# Patient Record
Sex: FEMALE | Race: WHITE | Employment: PART TIME | URBAN - METROPOLITAN AREA
[De-identification: names, ages, dates, MRNs, and addresses within clinical notes are randomized per-mention and may not be internally consistent; named-entity substitution may affect disease eponyms.]

---

## 2017-01-24 ENCOUNTER — TRANSFERRED RECORDS (OUTPATIENT)
Dept: HEALTH INFORMATION MANAGEMENT | Facility: CLINIC | Age: 27
End: 2017-01-24

## 2017-01-24 ENCOUNTER — OFFICE VISIT (OUTPATIENT)
Dept: SURGERY | Facility: CLINIC | Age: 27
End: 2017-01-24
Payer: COMMERCIAL

## 2017-01-24 VITALS
WEIGHT: 245.8 LBS | DIASTOLIC BLOOD PRESSURE: 58 MMHG | OXYGEN SATURATION: 98 % | TEMPERATURE: 98.4 F | BODY MASS INDEX: 43.55 KG/M2 | HEART RATE: 86 BPM | RESPIRATION RATE: 14 BRPM | HEIGHT: 63 IN | SYSTOLIC BLOOD PRESSURE: 120 MMHG

## 2017-01-24 DIAGNOSIS — R12 HEARTBURN: ICD-10-CM

## 2017-01-24 DIAGNOSIS — F41.9 ANXIETY: ICD-10-CM

## 2017-01-24 DIAGNOSIS — Z13.29 SCREENING FOR ENDOCRINE, NUTRITIONAL, METABOLIC AND IMMUNITY DISORDER: ICD-10-CM

## 2017-01-24 DIAGNOSIS — E66.01 OBESITY, MORBID, BMI 40.0-49.9 (H): Primary | ICD-10-CM

## 2017-01-24 DIAGNOSIS — Z13.228 SCREENING FOR ENDOCRINE, NUTRITIONAL, METABOLIC AND IMMUNITY DISORDER: ICD-10-CM

## 2017-01-24 DIAGNOSIS — D50.9 IRON DEFICIENCY ANEMIA, UNSPECIFIED IRON DEFICIENCY ANEMIA TYPE: ICD-10-CM

## 2017-01-24 DIAGNOSIS — Z13.21 SCREENING FOR ENDOCRINE, NUTRITIONAL, METABOLIC AND IMMUNITY DISORDER: ICD-10-CM

## 2017-01-24 DIAGNOSIS — Z13.0 SCREENING FOR ENDOCRINE, NUTRITIONAL, METABOLIC AND IMMUNITY DISORDER: ICD-10-CM

## 2017-01-24 DIAGNOSIS — J45.20 MILD INTERMITTENT ASTHMA IN ADULT WITHOUT COMPLICATION: ICD-10-CM

## 2017-01-24 DIAGNOSIS — K50.00 CROHN'S DISEASE OF SMALL INTESTINE WITHOUT COMPLICATION (H): ICD-10-CM

## 2017-01-24 PROCEDURE — 97802 MEDICAL NUTRITION INDIV IN: CPT | Performed by: DIETITIAN, REGISTERED

## 2017-01-24 PROCEDURE — 99205 OFFICE O/P NEW HI 60 MIN: CPT | Performed by: PHYSICIAN ASSISTANT

## 2017-01-24 RX ORDER — FERROUS SULFATE 325(65) MG
TABLET ORAL
COMMUNITY
End: 2017-11-27

## 2017-01-24 NOTE — PROGRESS NOTES
Name: IGNACIO MONROY  : 1990  Gender: Female  MRN: [id number]  Age: 26  INITIAL BARIATRIC NUTRITION ASSESSMENT  REASON FOR VISIT:  IGNACIO MONROY is a 26 year old Female presents today for initial nutrition visit for bariatric surgery.  DIAGNOSIS:  Class III Obesity  ANTHROPOMETRICS:  Height: 63 inches  Weight: 245.8 lbs  BMI: 43.5 kg/m2  CURRENT SUPPLEMENTS:  Multivitamin/Mineral: Multivitamin daily  WEIGHT LOSS ATTEMPTS:  Weight Watchers, Calorie Counting  Prescription diet medications:  None  NUTRITION HISTORY:  Meals per day: 3  Snacking: Yes  Breakfast: Hard boiled eggs, turkey sausage(week)   Lunch: Sandwiches,I usually eat out alot on the weekends, salads, frozen meals, yogurt  Supper: Steak, chicken, rice and a veggie usually  Snacks: pretzels,cheese, oranges, grapes, celery with PB. (3x/day)   Drinks: water, occasional almond milk   Emotional eating: Yes  Binge eating: No  Night eating: No  Can you afford three meals per day? Yes  Can you afford the $50-60 per month for vitamins? 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 Pt has been researching bariatric surgery extensively and is very aware of many of the dietary changes required. Of note, pt is newly diagnosed with Crohn's disease however well controlled through current meds. Per PA-C, potentially will take longer to get to surgery depending on GI doctor's POC. Pt had many appropriate questions today.  DINING OUT HISTORY:  Frequency per week: Around once a week  Location: sit-down restaurants  Type of food: I try to order off of the lighter fare menus. So it could be either salmon with rice and veggies, steak, turkey burgers, wraps etc.   PHYSICAL ACTIVITY:  Type: Walking  Frequency: < 1x/week  Duration (min): 20  NUTRITION DIAGNOSIS:  Patient with excessive oral food/beverage intake related to intake of calorically dense food/beverages at meals and/or snacks as evidenced by BMI of 43.5  INTERVENTION:  Nutrition  Prescription: Recommend nutrient/ energy modification   Goals:  Begin taking multivitamin, calcium and vitamin D per guidelines.  Practice taking 20-30 minutes to finish a meal.  Practice being mindful of satiety/fullness.  Avoid evening snacking.    Implementation:  Provided written guidelines for Laparoscopic Gastric Sleeve surgery.  Provided weight loss suggestions.  Explained diet changes that would occur after surgery.  Emphasized importance of diet and lifestyle modifications.  Discussed necessity for chewable multivitamin/ mineral supplements, calcium with vitamin D, vitamin B-12, vitamin D, Iron and vitamin C supplements.  NUTRITION MONITORING AND EVALUATION:  Verbalizes understanding of surgery diet guidelines.  Anticipated compliance: Good    FOLLOW UP: in 1 month  Recommend 2-3 follow up visits to assist with lifestyle changes or per insurance requirements.  RD name and number provided.  TIME SPENT WITH PATIENT: 45 minutes  Christina Tate RD, LD  Clinical Dietitian

## 2017-01-24 NOTE — PATIENT INSTRUCTIONS
Please refer to your task list created today at your appointment.  Make appointment to return to clinic in 1 month to see the dietician.  Have initial labs drawn.     [facility] 1st floor if you would like to have your blood tests drawn today or call [phone] to make an appointment to have them drawn on another day.  You may also get your blood drawn at your [facility] clinic.

## 2017-01-24 NOTE — PROGRESS NOTES
[facility]  [address]  [place] [address]  Phone [phone] Fax [phone]    Date: 2017  RE:   IGNACIO MONROY  MR#: [id number]  : 1990    Dear Dr Mavis MD;    I had the pleasure of seeing, IGNACIO MONROY, to evaluate her obesity and consider her for possible weight loss surgery.  She has an appointment with you in February to establish care. IGNACIO is 26 years old. She has been overweight since early childhood. She has been morbidly obese for the last 15 years and has been unable to achieve long-term success with weight loss attempts, such as: Weight Watchers, Calorie Counting, exercise, slim fast.    Comorbidities of obesity from her past medical history include: mild intermittent asthma.    The symptoms related to her obesity include  intermittent heartburn, Shortness of Breath with activity,and  intertrigo. The following ADLs are hindered due to her weight: Not enough energy to complete routine daily tasks, Shortness of breath with little activity.    Non-Obesity Related Past Medical History:    Crohn s disease- diagnosed 2016 at [facility]. At diagnosis inflammation and an abscess was present on colonoscopy. Was started on Remacaid.  Transferring and established care with [facility] 2017.  Current plan: continue Remacaid, Colonoscopy in May 2017.  Current symptoms: none.     Iron Deficency Anemia- HgB is around 8. Dx is related to crohn's.  [facility] is following and pt will most likely get iron infusions once recent irons labs are back.    Anxiety- stable, on prozac.    High Velocity noted in Aortic arch found via Echo around age 14.  Follow up MRI and Echo where normal.    Past Surgical History: No previous bleeding, anesthesia, or nausea concerns with surgery.   x 1  Breast Reduction  Hx of VSD as baby    Family History:  Mother- Diabetes;gall bladder problems;High blood pressure;High cholesterol;Obese,  MGF- Colon Cancer:   MGM-  "pulmonary embolism (PE), High blood pressure;High cholesterol, COPD  Sibling 1- High blood pressure;High cholesterol;Obese    Social History:  She is recently  to [place] who is also looking at starting the bariatric process. Has a 7 and 2 year old.  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  ETOH: Monthly, 1 drink at a time  Illicit Drug Use: None  Tobacco Use: No  Support system: , mom will be available to help the patient in the early post op period. , best friend, mom is who the patient can count on for support throughout her weight loss journey.  Can you afford three meals per day? Yes  Can you afford the $50-60 per month for vitamins? Yes    A 14 point review of system shows:  Pulmonary: Shortness of Breath with activity, asthma- only needs rescue inhaler during colds  Gastrointestinal: crohn's disease see PMH,  Psychological: Anxiety- well controlled on Prozac  Heme:  Iron deficiency anemia  Secondary to crohn's disease  Skin: intertrigo- self limiting under pannus    /58 mmHg  Pulse 86  Temp(Src) 98.4  F (36.9  C)  Resp 14  Ht 5' 3\" (1.6 m)  Wt 245 lb 12.8 oz (111.494 kg)  BMI 43.55 kg/m2  SpO2 98%    PHYSICAL EXAMINATION:  GENERAL: obese, good health, good development, and normal affect., Alert and oriented x3. NAD  HEENT: Trachea midline, Neck supple without lymphadenopathy, thyroidmegaly, or mass., Oral dentition adequate for chewing  CARDIOVASCULAR: Regular rate and rhythm, Transient systolic murmur heard best in L 2ICS,no  rubs or gallops  RESPIRATORY: Good breath sounds, Lung sounds clear to auscultation bilaterally  GASTROINTESTINAL: Obese, Soft, Nontender, Non-distended, No organomeagly or masses  LOWER EXTREMITIES: No LE edema. No cyanosis, ulceration, or chronic venous stasis  MUSCULOSKELETAL: Normal gait, Moves all 4 extremities equal and strong  NEUROLOGIC: cranial nerves II-XII grossly intact  SKIN: No intertriginous irritation or rash     In summary, " IGNACIO is morbidly obese with a body mass index of 43.5 kg/m2 and the comorbidities stated above. She attended an informational seminar and is a candidate for Laparoscopic Gastric Sleeve. She will have to complete the following pre-requisites:     Received weight loss goal of 5 lb prior to surgery.  Get treatment for anemia  Letter of clearance from GI specialist regarding chrohn's disease  Achieve clearance from dietitian to see surgeon.  Establish care with a primary care provider and have her evaluate murmur.  Have preoperative laboratory tests drawn.  Psychological Evaluation with MMPI and clearance for weight loss surgery.    Today in the office we discussed gastric sleeve surgery. Preoperative, perioperative, and postoperative processes, management, and follow up were addressed. Risks and benefits were outlined including the risk of death, PE, DVT, ulcer, worsening GERD, N/V, stricture, hernia, wound infection, and vitamin deficiencies. All questions were answered. A goal sheet and support group handout were given to the patient.    Once the patient has completed the requirements set forth in paragraph one, and there are no further recommendations, she will be allowed to see the surgeon of her choice for consultation on the Laparoscopic Gastric Sleeve surgery. Patient verbalizes understanding of the process to surgery and expectations for the postoperative period including the need for lifelong lifestyle changes, vitamin supplementation, and laboratory monitoring.    Thank you for allowing us to participate in her care.    Sincerely,           Yaz Josue PA-C    At [facility] we are committed to providing you exceptional care. Our surgeons specialize in performing the following minimally invasive weight-loss surgeries:    Edith-en-Y gastric bypass  Laparoscopic adjustable gastric band  Sleeve gastrectomy    If you would like to review aspects of our weight loss surgery program, please visit our  website at [email or web address]. If you have any questions, please do not hesitate to contact us at [phone].

## 2017-01-24 NOTE — MR AVS SNAPSHOT
After Visit Summary   1/24/2017    Kate Uribe    MRN: [id number]           Patient Information     Date Of Birth          1990        Visit Information        Provider Department      1/24/2017 1:00 PM Yaz Josue PA-C [facility]      Today's Diagnoses     Obesity, morbid, BMI 40.0-49.9 (H)    -  1     Screening for endocrine, nutritional, metabolic and immunity disorder         Crohn's disease of small intestine without complication (H)         Iron deficiency anemia, unspecified iron deficiency anemia type         Mild intermittent asthma in adult without complication         Heartburn         Anxiety           Care Instructions    Please refer to your task list created today at your appointment.  Make appointment to return to clinic in 1 month to see the dietician.  Have initial labs drawn.     Check in at the [facility] on the 1st floor if you would like to have your blood tests drawn today or call [phone] to make an appointment to have them drawn on another day.  You may also get your blood drawn at your [facility].          Follow-ups after your visit        Additional Services     NUTRITION REFERRAL       Type of nutrition instruction needed: Weight Loss  Your provider has referred you to:     [facility]                  Follow-up notes from your care team     Return in 1 month (on 2/24/2017).      Your next 10 appointments already scheduled     Feb 06, 2017  7:40 AM   PHYSICAL with Bibi Gamble MD   [facility] ([facility]    [address]  [address]  [place] [address]   [phone]            Apr 11, 2017 12:00 PM   New Visit with Greta Lane LP   [facility]    [address]  [place] [address]   [phone]            Apr 18, 2017 11:00 AM    Return Visit with Greta Lane LP   [facility] ([facility])    [address]  [place] [id number] [phone]            May 16, 2017 11:00 AM   Return Visit with Greta Lane LP   [facility] ([facility])    [address]  [place] [address] [phone]              Future tests that were ordered for you today     Open Future Orders        Priority Expected Expires Ordered    Vitamin D Deficiency Routine  7/23/2017 1/24/2017            Who to contact     If you have questions or need follow up information about today's clinic visit or your schedule please contact [facility] directly at [phone].  Normal or non-critical lab and imaging results will be communicated to you by Co[employer]hart, letter or phone within 4 business days after the clinic has received the results. If you do not hear from us within 7 days, please contact the clinic through Co[employer] or phone. If you have a critical or abnormal lab result, we will notify you by phone as soon as possible.  Submit refill requests through ActionFlow or call your pharmacy and they will forward the refill request to us. Please allow 3 business days for your refill to be completed.          Additional Information About Your Visit        CoAlignhart Information     ActionFlow gives you secure access to your electronic health record. If you see a primary care provider, you can also send messages to your care team and make appointments. If you have questions, please call your primary care clinic.  If you do not have a primary care provider, please call [phone] and they will assist you.        Care EveryWhere ID     This is your Care EveryWhere ID. This could be used by other organizations to access your [facility] medical records  [id number]        Your Vitals Were     Pulse Temperature Respirations Height BMI (Body Mass Index) Pulse Oximetry    86 98.4  F (36.9  " C) 14 5' 3\" (1.6 m) 43.55 kg/m2 98%       Blood Pressure from Last 3 Encounters:   01/24/17 120/58   10/12/13 124/55    Weight from Last 3 Encounters:   01/24/17 245 lb 12.8 oz (111.494 kg)   10/12/13 240 lb (108.863 kg)              We Performed the Following     NUTRITION REFERRAL     OP ROOMING NOTE TO JOHNNY        Primary Care Provider Office Phone # Fax #    [facility] [phone]       [place] [address]  [place] [facility] [address]        Thank you!     Thank you for choosing [facility]  for your care. Our goal is always to provide you with excellent care. Hearing back from our patients is one way we can continue to improve our services. Please take a few minutes to complete the written survey that you may receive in the mail after your visit with us. Thank you!             Your Updated Medication List - Protect others around you: Learn how to safely use, store and throw away your medicines at www.disposemymeds.org.          This list is accurate as of: 1/24/17  2:03 PM.  Always use your most recent med list.                   Brand Name Dispense Instructions for use    albuterol 108 (90 BASE) MCG/ACT Inhaler    PROAIR HFA/PROVENTIL HFA/VENTOLIN HFA     Inhale 2 puffs into the lungs every 6 hours       ferrous sulfate 325 (65 FE) MG tablet    IRON     Take by mouth daily (with breakfast)       HYDROXYZINE HCL PO      Take 25 mg by mouth as needed       NEXPLANON SC          OMEPRAZOLE PO      Take 20 mg by mouth 2 times daily       PROZAC 20 MG capsule   Generic drug:  FLUoxetine      Take 20 mg by mouth daily       REMICADE IV      Inject 500 mg into the vein         "

## 2017-01-24 NOTE — Clinical Note
Hi Dr. Gamble,  I had the pleasure of seeing Kate in our bariatric clinic to evaluate her  obesity. She has an appt with you soon to establish care.  She is considering the baraitric surgery and had an initial evaluation today.  One thing I have asked her to review with you is a cardiac murmur I heard transiently on exam.  Let me know if you have any questions. I will keep you abreast of her progress.  Sincerely,  Yaz Josue PA-C  Sincerely,   Yaz Josue PA-C

## 2017-01-27 ENCOUNTER — OFFICE VISIT (OUTPATIENT)
Dept: URGENT CARE | Facility: URGENT CARE | Age: 27
End: 2017-01-27
Payer: COMMERCIAL

## 2017-01-27 VITALS — DIASTOLIC BLOOD PRESSURE: 62 MMHG | HEART RATE: 80 BPM | SYSTOLIC BLOOD PRESSURE: 120 MMHG | OXYGEN SATURATION: 96 %

## 2017-01-27 DIAGNOSIS — L73.9 FOLLICULITIS: Primary | ICD-10-CM

## 2017-01-27 PROCEDURE — 99213 OFFICE O/P EST LOW 20 MIN: CPT | Performed by: INTERNAL MEDICINE

## 2017-01-27 RX ORDER — DOXYCYCLINE 100 MG/1
100 CAPSULE ORAL 2 TIMES DAILY
Qty: 20 CAPSULE | Refills: 0 | Status: SHIPPED | OUTPATIENT
Start: 2017-01-27 | End: 2017-02-06

## 2017-01-27 RX ORDER — MUPIROCIN 20 MG/G
OINTMENT TOPICAL 3 TIMES DAILY
Qty: 22 G | Refills: 1 | Status: SHIPPED | OUTPATIENT
Start: 2017-01-27 | End: 2017-02-01

## 2017-01-27 NOTE — PROGRESS NOTES
[facility] Progress Note        Sonia Roman MD, MPH  01/27/2017    Kate Uribe is a pleasant 26 year old female seen for a rash involving face x 3 days .  There has not been any change in the diet or new detergent, soap or toiletries.  No new medications, no insect bite.  No fever or chills and no recent illness.  No cough or shortness of breath or wheezing is referred.  No nausea, vomiting or diarrhea.  No arthralgia or myalgia.  No tongue or lip swelling or swallowing problems. She has been applying triamcinolone cream to the face in the past 3 days.    Physical Exam  /62 mmHg  Pulse 80  SpO2 96%     Constitutional: Patient is in no distress The patient is pleasant and cooperative.   HEENT: Head:  Head is atraumatic, normocephalic.    Eyes: Pupils are equal, round and reactive to light and accomodation.  Sclera is non-icteric. No conjunctival injection, or exudate noted. Extraocular motion is intact. Visual acuity is intact bilaterally.  Ears:  External acoustic canals are patent and clear.  There is no erythema and bulging( exudate)  of the ( R/L ) tympanic membrane(s ).   Nose:  No Nasal congestion w/o drainage or mucosal ulceration is noted.  Throat:  Oral mucosa is moist.  No oral lesions are noted.  No posterior pharyngeal hyperemia or exudate noted.     Neck Supple.  There is no cervical lymphadenopathy.  No nuchal rigidity noted.  There is no meningismus.     Cardiovascular: Heart is regular to rate and rhythm.  No murmur is noted.     Chest. Chest Symmetrical, no soft tissues, swelling, or tenderness upon palpation   Lungs: Clear in the anterior and posterior pulmonary fields.   Abdomen: Soft and non-tender.    Back No flank tenderness is noted.   Extremeties No edema, no calf tenderness.   Neuro: No focal deficit.   Skin No petechiae or purpura is noted.  There is rash w follicular pattern involving the face. No pustules and vesicular eruption is noted. No cellulitis.    Mood Normal         Assessment and Plan  Folliculitis of the face:  bactroban ointment applied topically TID 22 gram w a refill.  Doxycycline 100 mg po BID x 10 days  Discussed the adverse effects of this medication w the patient including GI distress and pigmentary changes and photosensitivity.  Follow-up with your PCP in 1 week, earlier if symptoms worsen.  Advised to avoid hot baths/showers.  Advised to avoid irritating soap/detergents.  Avoid warm environments.  Avoid application of steroid preparations to the face.  Use Benedryl every 8 hours as needed for pruritis ( discussed the sedative nature of benadryl and advised patient to avoid operating equipment/machinery and caution with driving is advised ).

## 2017-01-27 NOTE — NURSING NOTE
"Chief Complaint   Patient presents with     Urgent Care     Derm Problem     rash on face OTC: vaseline       Initial /62 mmHg  Pulse 80  SpO2 96% Estimated body mass index is 43.55 kg/(m^2) as calculated from the following:    Height as of 1/24/17: 5' 3\" (1.6 m).    Weight as of 1/24/17: 245 lb 12.8 oz (111.494 kg).  BP completed using cuff size: indira Renteria CMA                                1/27/2017 3:22 PM       "

## 2017-01-31 ENCOUNTER — TRANSFERRED RECORDS (OUTPATIENT)
Dept: HEALTH INFORMATION MANAGEMENT | Facility: CLINIC | Age: 27
End: 2017-01-31

## 2017-02-01 ENCOUNTER — MEDICAL CORRESPONDENCE (OUTPATIENT)
Dept: HEALTH INFORMATION MANAGEMENT | Facility: CLINIC | Age: 27
End: 2017-02-01

## 2017-02-01 ENCOUNTER — TRANSFERRED RECORDS (OUTPATIENT)
Dept: HEALTH INFORMATION MANAGEMENT | Facility: CLINIC | Age: 27
End: 2017-02-01

## 2017-02-03 NOTE — PATIENT INSTRUCTIONS
Increase prozac to 40mg.  FOLLOW UP with me in 2 months for anxiety/depression.      Preventive Health Recommendations  Female Ages 26 - 39  Yearly exam:   See your health care provider every year in order to    Review health changes.     Discuss preventive care.      Review your medicines if you your doctor has prescribed any.    Until age 30: Get a Pap test every three years (more often if you have had an abnormal result).    After age 30: Talk to your doctor about whether you should have a Pap test every 3 years or have a Pap test with HPV screening every 5 years.   You do not need a Pap test if your uterus was removed (hysterectomy) and you have not had cancer.  You should be tested each year for STDs (sexually transmitted diseases), if you're at risk.   Talk to your provider about how often to have your cholesterol checked.  If you are at risk for diabetes, you should have a diabetes test (fasting glucose).  Shots: Get a flu shot each year. Get a tetanus shot every 10 years.   Nutrition:     Eat at least 5 servings of fruits and vegetables each day.    Eat whole-grain bread, whole-wheat pasta and brown rice instead of white grains and rice.    Talk to your provider about Calcium and Vitamin D.     Lifestyle    Exercise at least 150 minutes a week (30 minutes a day, 5 days of the week). This will help you control your weight and prevent disease.    Limit alcohol to one drink per day.    No smoking.     Wear sunscreen to prevent skin cancer.    See your dentist every six months for an exam and cleaning.

## 2017-02-03 NOTE — PROGRESS NOTES
"   SUBJECTIVE:     CC: Kate Uribe is an 26 year old woman who presents for preventive health visit.     Healthy Habits:    Do you get at least three servings of calcium containing foods daily (dairy, green leafy vegetables, etc.)? {YES/NO, DAIRY INTAKE:189079::\"yes\"}    Amount of exercise or daily activities, outside of work: {AMOUNT EXERCISE:985443}    Problems taking medications regularly {Yes /No default:328041::\"No\"}    Medication side effects: {Yes /No default.:302542::\"No\"}    Have you had an eye exam in the past two years? {YESNOBLANK:257287}    Do you see a dentist twice per year? {YESNOBLANK:637491}    Do you have sleep apnea, excessive snoring or daytime drowsiness?{YESNOBLANK:173132}    {Outside tests to abstract? :374944}    {additional problems to add:760658}    Today's PHQ-2 Score: No flowsheet data found.  {PHQ-2 LOOK IN ASSESSMENTS :402525}  Abuse: Current or Past(Physical, Sexual or Emotional)- {YES/NO/NA:829714}  Do you feel safe in your environment - {YES/NO/NA:315119}    Social History   Substance Use Topics     Smoking status: Never Smoker      Smokeless tobacco: Not on file     Alcohol Use: Not on file     {ETOH AUDIT:357273}    No results for input(s): CHOL, HDL, LDL, TRIG, CHOLHDLRATIO, NHDL in the last 68347 hours.    Reviewed orders with patient.  Reviewed health maintenance and updated orders accordingly - {Yes/No:511338::\"Yes\"}    Mammo Decision Support:  {Mammo:928427}    Pertinent mammograms are reviewed under the imaging tab.  History of abnormal Pap smear: {PAP HX:601554}  All Histories reviewed and updated in Epic.  {HISTORY OPTIONS:124997}    ROS:  {FEMALE PREVENTATIVE ROS:869824}    {CHRONICPROBDATA:140008}  OBJECTIVE:     There were no vitals taken for this visit.  EXAM:  {Exam Choices:519116}    ASSESSMENT/PLAN:     {Diag Picklist:408676}    COUNSELING:   {FEMALE COUNSELING MESSAGES:109603::\"Reviewed preventive health counseling, as reflected in patient " "instructions\"}    {Blood Pressure Screenin}     reports that she has never smoked. She does not have any smokeless tobacco history on file.  {Tobacco Cessation needed for ACO -- Delete if patient is a non-smoker:319148}  Estimated body mass index is 43.55 kg/(m^2) as calculated from the following:    Height as of 17: 5' 3\" (1.6 m).    Weight as of 17: 245 lb 12.8 oz (111.494 kg).   {Weight Management Plan needed for ACO:337622}    Counseling Resources:  ATP IV Guidelines  Pooled Cohorts Equation Calculator  Breast Cancer Risk Calculator  FRAX Risk Assessment  ICSI Preventive Guidelines  Dietary Guidelines for Americans, 2010  USDA's MyPlate  ASA Prophylaxis  Lung CA Screening    Bibi Gamble MD  [facility] [place]  "

## 2017-02-06 ENCOUNTER — OFFICE VISIT (OUTPATIENT)
Dept: PEDIATRICS | Facility: CLINIC | Age: 27
End: 2017-02-06
Payer: COMMERCIAL

## 2017-02-06 VITALS
TEMPERATURE: 98.5 F | BODY MASS INDEX: 43.23 KG/M2 | HEART RATE: 88 BPM | OXYGEN SATURATION: 98 % | SYSTOLIC BLOOD PRESSURE: 108 MMHG | HEIGHT: 63 IN | WEIGHT: 244 LBS | DIASTOLIC BLOOD PRESSURE: 66 MMHG

## 2017-02-06 DIAGNOSIS — J45.20 MILD INTERMITTENT ASTHMA IN ADULT WITHOUT COMPLICATION: ICD-10-CM

## 2017-02-06 DIAGNOSIS — Z01.411 ENCOUNTER FOR GYNECOLOGICAL EXAMINATION WITH ABNORMAL FINDING: Primary | ICD-10-CM

## 2017-02-06 DIAGNOSIS — Z13.29 SCREENING FOR ENDOCRINE, NUTRITIONAL, METABOLIC AND IMMUNITY DISORDER: ICD-10-CM

## 2017-02-06 DIAGNOSIS — D50.9 IRON DEFICIENCY ANEMIA, UNSPECIFIED IRON DEFICIENCY ANEMIA TYPE: ICD-10-CM

## 2017-02-06 DIAGNOSIS — Z13.21 SCREENING FOR ENDOCRINE, NUTRITIONAL, METABOLIC AND IMMUNITY DISORDER: ICD-10-CM

## 2017-02-06 DIAGNOSIS — F41.9 ANXIETY: ICD-10-CM

## 2017-02-06 DIAGNOSIS — E66.01 OBESITY, MORBID, BMI 40.0-49.9 (H): ICD-10-CM

## 2017-02-06 DIAGNOSIS — K50.00 CROHN'S DISEASE OF SMALL INTESTINE WITHOUT COMPLICATION (H): ICD-10-CM

## 2017-02-06 DIAGNOSIS — R87.610 ATYPICAL SQUAMOUS CELLS OF UNDETERMINED SIGNIFICANCE ON CYTOLOGIC SMEAR OF CERVIX (ASC-US): ICD-10-CM

## 2017-02-06 DIAGNOSIS — Z13.0 SCREENING FOR ENDOCRINE, NUTRITIONAL, METABOLIC AND IMMUNITY DISORDER: ICD-10-CM

## 2017-02-06 DIAGNOSIS — Z13.228 SCREENING FOR ENDOCRINE, NUTRITIONAL, METABOLIC AND IMMUNITY DISORDER: ICD-10-CM

## 2017-02-06 DIAGNOSIS — K21.9 GASTROESOPHAGEAL REFLUX DISEASE WITHOUT ESOPHAGITIS: ICD-10-CM

## 2017-02-06 LAB
ALBUMIN SERPL-MCNC: 3.3 G/DL (ref 3.4–5)
ALP SERPL-CCNC: 96 U/L (ref 40–150)
ALT SERPL W P-5'-P-CCNC: 28 U/L (ref 0–50)
ANION GAP SERPL CALCULATED.3IONS-SCNC: 8 MMOL/L (ref 3–14)
AST SERPL W P-5'-P-CCNC: 19 U/L (ref 0–45)
BILIRUB SERPL-MCNC: 0.1 MG/DL (ref 0.2–1.3)
BUN SERPL-MCNC: 17 MG/DL (ref 7–30)
CALCIUM SERPL-MCNC: 8.8 MG/DL (ref 8.5–10.1)
CHLORIDE SERPL-SCNC: 107 MMOL/L (ref 94–109)
CHOLEST SERPL-MCNC: 175 MG/DL
CO2 SERPL-SCNC: 24 MMOL/L (ref 20–32)
CREAT SERPL-MCNC: 0.64 MG/DL (ref 0.52–1.04)
DEPRECATED CALCIDIOL+CALCIFEROL SERPL-MC: 21 UG/L (ref 20–75)
GFR SERPL CREATININE-BSD FRML MDRD: ABNORMAL ML/MIN/1.7M2
GLUCOSE SERPL-MCNC: 88 MG/DL (ref 70–99)
HDLC SERPL-MCNC: 39 MG/DL
LDLC SERPL CALC-MCNC: 116 MG/DL
NONHDLC SERPL-MCNC: 136 MG/DL
POTASSIUM SERPL-SCNC: 4.1 MMOL/L (ref 3.4–5.3)
PROT SERPL-MCNC: 7.5 G/DL (ref 6.8–8.8)
SODIUM SERPL-SCNC: 139 MMOL/L (ref 133–144)
TRIGL SERPL-MCNC: 102 MG/DL

## 2017-02-06 PROCEDURE — 82306 VITAMIN D 25 HYDROXY: CPT | Performed by: INTERNAL MEDICINE

## 2017-02-06 PROCEDURE — 36415 COLL VENOUS BLD VENIPUNCTURE: CPT | Performed by: PEDIATRICS

## 2017-02-06 PROCEDURE — 99395 PREV VISIT EST AGE 18-39: CPT | Performed by: PEDIATRICS

## 2017-02-06 PROCEDURE — 99213 OFFICE O/P EST LOW 20 MIN: CPT | Mod: 25 | Performed by: PEDIATRICS

## 2017-02-06 PROCEDURE — 80053 COMPREHEN METABOLIC PANEL: CPT | Performed by: PEDIATRICS

## 2017-02-06 PROCEDURE — G0145 SCR C/V CYTO,THINLAYER,RESCR: HCPCS | Performed by: PEDIATRICS

## 2017-02-06 PROCEDURE — 80061 LIPID PANEL: CPT | Performed by: PEDIATRICS

## 2017-02-06 RX ORDER — HYDROXYZINE HYDROCHLORIDE 25 MG/1
25-50 TABLET, FILM COATED ORAL EVERY 6 HOURS PRN
Qty: 60 TABLET | Refills: 1 | Status: SHIPPED | OUTPATIENT
Start: 2017-02-06 | End: 2017-08-07

## 2017-02-06 RX ORDER — FLUOXETINE 40 MG/1
40 CAPSULE ORAL DAILY
Qty: 90 CAPSULE | Refills: 1 | Status: SHIPPED | OUTPATIENT
Start: 2017-02-06 | End: 2017-07-05

## 2017-02-06 NOTE — PROGRESS NOTES
SUBJECTIVE:     CC: Kate Uribe is an 26 year old woman who presents for preventive health visit.     HPI Comments:   Establish care - recheck anxiety meds    Weight gain    Physical  Annual:     Getting at least 3 servings of Calcium per day::  Yes    Bi-annual eye exam::  Yes    Dental care twice a year::  NO    Sleep apnea or symptoms of sleep apnea::  None    Diet::  Regular (no restrictions)    Frequency of exercise::  None    Taking medications regularly::  Yes    Medication side effects::  None    Additional concerns today::  No    Anxiety - previously on zoloft a few months ago, but gained 35# (unsure if from this or starting remicaide). Switched to prozac 2 months ago - not working as well.  Also using hydroxyzine - more for increased anxiety instead of panic attacks (has only had 2 panic attacks several years ago post partum).  Interested in seeing a therapist - nivia to discuss feelings of possible bariatric surgery.  Her GI doctors don't think it's a good idea right now.    History of abnormal PAP - PL updated.    Mild intermittent asthma - well controlled    Today's PHQ-2 Score:   PHQ-2 ( 1999 Pfizer) 2/6/2017   Q1: Little interest or pleasure in doing things 0   Q2: Feeling down, depressed or hopeless 0   PHQ-2 Score 0   Little interest or pleasure in doing things -   Feeling down, depressed or hopeless -   PHQ-2 Score -       Abuse: Current or Past(Physical, Sexual or Emotional)- No  Do you feel safe in your environment - Yes    Social History   Substance Use Topics     Smoking status: Never Smoker      Smokeless tobacco: Not on file     Alcohol Use: 0.0 - 1.2 oz/week     0-2 Standard drinks or equivalent per week     The patient does not drink >3 drinks per day nor >7 drinks per week.    No results for input(s): CHOL, HDL, LDL, TRIG, CHOLHDLRATIO, NHDL in the last 21274 hours.    Reviewed orders with patient.  Reviewed health maintenance and updated orders accordingly - Yes    Mammo Decision  "Support:  Mammogram not appropriate for this patient based on age.    Pertinent mammograms are reviewed under the imaging tab.  History of abnormal Pap smear: YES - updated in Problem List and Health Maintenance accordingly  All Histories reviewed and updated in Epic.    ROS:  C: NEGATIVE for fever, chills, change in weight  I: NEGATIVE for worrisome rashes, moles or lesions  E: NEGATIVE for vision changes or irritation  ENT: NEGATIVE for ear, mouth and throat problems  R: NEGATIVE for significant cough or SOB  B: NEGATIVE for masses, tenderness or discharge  CV: NEGATIVE for chest pain, palpitations or peripheral edema  GI: NEGATIVE for nausea, abdominal pain, heartburn, or change in bowel habits  : NEGATIVE for unusual urinary or vaginal symptoms. Periods are regular.  M: NEGATIVE for significant arthralgias or myalgia  N: NEGATIVE for weakness, dizziness or paresthesias  P: NEGATIVE for changes in mood or affect    Problem list, Medication list, Allergies, and Medical/Social/Surgical histories reviewed in [facility] and updated as appropriate.  OBJECTIVE:     /66 mmHg  Pulse 88  Temp(Src) 98.5  F (36.9  C) (Oral)  Ht 5' 3\" (1.6 m)  Wt 244 lb (110.678 kg)  BMI 43.23 kg/m2  SpO2 98%  Breastfeeding? No  EXAM:  GENERAL: healthy, alert and no distress  EYES: Eyes grossly normal to inspection, PERRL and conjunctivae and sclerae normal  HENT: ear canals and TM's normal, nose and mouth without ulcers or lesions  NECK: no adenopathy, no asymmetry, masses, or scars and thyroid normal to palpation  RESP: lungs clear to auscultation - no rales, rhonchi or wheezes  BREAST: normal without masses, tenderness or nipple discharge and no palpable axillary masses or adenopathy  CV: regular rate and rhythm, normal S1 S2, no S3 or S4, no murmur, click or rub, no peripheral edema and peripheral pulses strong  ABDOMEN: soft, nontender, no hepatosplenomegaly, no masses and bowel sounds normal   (female): normal female " "external genitalia, normal urethral meatus, vaginal mucosa pink, moist, well rugated, and normal cervix/adnexa/uterus without masses or discharge  MS: no gross musculoskeletal defects noted, no edema  SKIN: no suspicious lesions or rashes  NEURO: Normal strength and tone, mentation intact and speech normal  PSYCH: mentation appears normal, affect normal/bright    ASSESSMENT/PLAN:     1. Anxiety  Not at goal - increase prozac to 40mg and follow up in 2 months.  Therapist referral - prefers female provider  - FLUoxetine (PROZAC) 40 MG capsule; Take 1 capsule (40 mg) by mouth daily  Dispense: 90 capsule; Refill: 1  - hydrOXYzine (ATARAX) 25 MG tablet; Take 1-2 tablets (25-50 mg) by mouth every 6 hours as needed for anxiety  Dispense: 60 tablet; Refill: 1  - MENTAL HEALTH REFERRAL    2. Atypical squamous cells of undetermined significance on cytologic smear of cervix (ASC-US)  PL updated    3. Mild intermittent asthma in adult without complication  controlled    4. Crohn's disease of small intestine without complication (H)  Stable - follows with  [facility]    5. Obesity, morbid, BMI 40.0-49.9 (H)  Considering bariatric surgery    6. Gastroesophageal reflux disease without esophagitis  Taking omeprazole prn    7. Iron deficiency anemia, unspecified iron deficiency anemia type  Followed by GI, 2/2 chrons    8. Encounter for gynecological examination with abnormal finding  - Lipid Profile with reflex to direct LDL  - Comprehensive metabolic panel  - Pap imaged thin layer screen reflex to HPV if ASCUS - recommend age 25 - 29    COUNSELING:  Reviewed preventive health counseling, as reflected in patient instructions       Regular exercise       Healthy diet/nutrition       reports that she has never smoked. She does not have any smokeless tobacco history on file.    Estimated body mass index is 43.23 kg/(m^2) as calculated from the following:    Height as of this encounter: 5' 3\" (1.6 m).    Weight as of this encounter: 244 lb " (110.678 kg).   Weight management plan: following with bariatric surgery    Counseling Resources:  ATP IV Guidelines  Pooled Cohorts Equation Calculator  Breast Cancer Risk Calculator  FRAX Risk Assessment  ICSI Preventive Guidelines  Dietary Guidelines for Americans, 2010  USDA's MyPlate  ASA Prophylaxis  Lung CA Screening    Bibi Gamble MD  [facility] [place]    Answers for HPI/ROS submitted by the patient on 2/5/2017   PHQ-2 Depressed: Not at all, Not at all  PHQ-2 Score: 0

## 2017-02-06 NOTE — NURSING NOTE
"Chief Complaint   Patient presents with     Physical     Pre Visit Planning - Done       Initial /66 mmHg  Pulse 88  Temp(Src) 98.5  F (36.9  C) (Oral)  Ht 5' 3\" (1.6 m)  Wt 244 lb (110.678 kg)  BMI 43.23 kg/m2  SpO2 98%  Breastfeeding? No Estimated body mass index is 43.23 kg/(m^2) as calculated from the following:    Height as of this encounter: 5' 3\" (1.6 m).    Weight as of this encounter: 244 lb (110.678 kg).  Medication Reconciliation: complete  "

## 2017-02-06 NOTE — MR AVS SNAPSHOT
After Visit Summary   2/6/2017    Kate Uribe    MRN: [id number]           Patient Information     Date Of Birth          1990        Visit Information        Provider Department      2/6/2017 7:40 AM Bibi Gamble MD [facility]        Today's Diagnoses     Encounter for gynecological examination with abnormal finding    -  1     Anxiety         Atypical squamous cells of undetermined significance on cytologic smear of cervix (ASC-US)         Mild intermittent asthma in adult without complication         Crohn's disease of small intestine without complication (H)         Obesity, morbid, BMI 40.0-49.9 (H)         Gastroesophageal reflux disease without esophagitis         Iron deficiency anemia, unspecified iron deficiency anemia type           Care Instructions    Increase prozac to 40mg.  FOLLOW UP with me in 2 months for anxiety/depression.      Preventive Health Recommendations  Female Ages 26 - 39  Yearly exam:   See your health care provider every year in order to    Review health changes.     Discuss preventive care.      Review your medicines if you your doctor has prescribed any.    Until age 30: Get a Pap test every three years (more often if you have had an abnormal result).    After age 30: Talk to your doctor about whether you should have a Pap test every 3 years or have a Pap test with HPV screening every 5 years.   You do not need a Pap test if your uterus was removed (hysterectomy) and you have not had cancer.  You should be tested each year for STDs (sexually transmitted diseases), if you're at risk.   Talk to your provider about how often to have your cholesterol checked.  If you are at risk for diabetes, you should have a diabetes test (fasting glucose).  Shots: Get a flu shot each year. Get a tetanus shot every 10 years.   Nutrition:     Eat at least 5 servings of fruits and vegetables each day.    Eat whole-grain bread, whole-wheat pasta and brown rice  instead of white grains and rice.    Talk to your provider about Calcium and Vitamin D.     Lifestyle    Exercise at least 150 minutes a week (30 minutes a day, 5 days of the week). This will help you control your weight and prevent disease.    Limit alcohol to one drink per day.    No smoking.     Wear sunscreen to prevent skin cancer.    See your dentist every six months for an exam and cleaning.            Follow-ups after your visit        Additional Services     MENTAL HEALTH REFERRAL       Your provider has referred you to: [facility]: [facility] - Counseling (Individual/Couples/Family) - [facility] [place] [phone]   *Patient will be contacted by [facility] scheduling partner[facility]), to schedule an appointment.  Patients may also call [facility] to schedule.    Patient would like a female therapist - ok if not [place]    All scheduling is subject to the client's specific insurance plan & benefits, provider/location availability, and provider clinical specialities.  Please arrive 15 minutes early for your first appointment and bring your completed paperwork.    Please be aware that coverage of these services is subject to the terms and limitations of your health insurance plan.  Call member services at your health plan with any benefit or coverage questions.                  Your next 10 appointments already scheduled     Feb 22, 2017  3:30 PM   Weight Loss Visit with Olga Phillips Diet 1, RD   [facility] ([facility])    [address] [place] [id number] [phone]            Mar 18, 2017 11:00 AM   (Arrive by 10:45 AM)   New Patient Visit with Bharath San MD   [facility] ([facility])    [address] [place] [address] [phone]            Mar 24, 2017  8:30 AM   Weight Loss Visit with Olga Phillips  "Diet 3, RD   [facility] [place] ([facility])    [address]  [place] [address] [phone]            Apr 28, 2017  8:30 AM   Weight Loss Visit with Sh Alan Diet 2, RD   [facility] [place] ([facility])    [address]  [place] [address] [phone]              Who to contact     If you have questions or need follow up information about today's clinic visit or your schedule please contact [facility] [place] directly at [phone].  Normal or non-critical lab and imaging results will be communicated to you by LyfeSystemshart, letter or phone within 4 business days after the clinic has received the results. If you do not hear from us within 7 days, please contact the clinic through GonnaBet or phone. If you have a critical or abnormal lab result, we will notify you by phone as soon as possible.  Submit refill requests through Pirq or call your pharmacy and they will forward the refill request to us. Please allow 3 business days for your refill to be completed.          Additional Information About Your Visit        Pirq Information     Pirq gives you secure access to your electronic health record. If you see a primary care provider, you can also send messages to your care team and make appointments. If you have questions, please call your primary care clinic.  If you do not have a primary care provider, please call [phone] and they will assist you.        Care EveryWhere ID     This is your Care EveryWhere ID. This could be used by other organizations to access your [place] medical records  [id number]        Your Vitals Were     Pulse Temperature Height BMI (Body Mass Index) Pulse Oximetry Breastfeeding?    88 98.5  F (36.9  C) (Oral) 5' 3\" (1.6 m) 43.23 kg/m2 98% No       Blood Pressure from Last 3 Encounters:   02/06/17 108/66   01/27/17 120/62 "   01/24/17 120/58    Weight from Last 3 Encounters:   02/06/17 244 lb (110.678 kg)   01/24/17 245 lb 12.8 oz (111.494 kg)   10/12/13 240 lb (108.863 kg)              We Performed the Following     Comprehensive metabolic panel     Lipid Profile with reflex to direct LDL     MENTAL HEALTH REFERRAL     Pap imaged thin layer screen reflex to HPV if ASCUS - recommend age 25 - 29          Today's Medication Changes          These changes are accurate as of: 2/6/17  8:09 AM.  If you have any questions, ask your nurse or doctor.               These medicines have changed or have updated prescriptions.        Dose/Directions    * HYDROXYZINE HCL PO   This may have changed:  Another medication with the same name was added. Make sure you understand how and when to take each.   Changed by:  Yaz Josue PA-C        Dose:  25 mg   Take 25 mg by mouth as needed   Refills:  0       * hydrOXYzine 25 MG tablet   Commonly known as:  ATARAX   This may have changed:  You were already taking a medication with the same name, and this prescription was added. Make sure you understand how and when to take each.   Used for:  Anxiety   Changed by:  Bibi Gamble MD        Dose:  25-50 mg   Take 1-2 tablets (25-50 mg) by mouth every 6 hours as needed for anxiety   Quantity:  60 tablet   Refills:  1       * PROZAC 20 MG capsule   This may have changed:  Another medication with the same name was added. Make sure you understand how and when to take each.   Generic drug:  FLUoxetine   Changed by:  Yaz Josue PA-C        Dose:  20 mg   Take 20 mg by mouth daily   Refills:  0       * FLUoxetine 40 MG capsule   Commonly known as:  PROzac   This may have changed:  You were already taking a medication with the same name, and this prescription was added. Make sure you understand how and when to take each.   Used for:  Anxiety   Changed by:  Bibi Gamble MD        Dose:  40 mg   Take 1 capsule (40 mg) by mouth  daily   Quantity:  90 capsule   Refills:  1       * Notice:  This list has 4 medication(s) that are the same as other medications prescribed for you. Read the directions carefully, and ask your doctor or other care provider to review them with you.      Stop taking these medicines if you haven't already. Please contact your care team if you have questions.     doxycycline 100 MG capsule   Commonly known as:  VIBRAMYCIN   Stopped by:  Bibi Gamble MD                Where to get your medicines      These medications were sent to [facility] [place] [facility] [place] [facility]  [address]   [address]  [address], [place] [address]     Phone:  [phone]    - FLUoxetine 40 MG capsule  - hydrOXYzine 25 MG tablet             Primary Care Provider Office Phone # Fax #    Bibi Gamlbe [phone]       [facility] [address] [place] [address]        Thank you!     Thank you for choosing [facility]  for your care. Our goal is always to provide you with excellent care. Hearing back from our patients is one way we can continue to improve our services. Please take a few minutes to complete the written survey that you may receive in the mail after your visit with us. Thank you!             Your Updated Medication List - Protect others around you: Learn how to safely use, store and throw away your medicines at www.disposemymeds.org.          This list is accurate as of: 2/6/17  8:09 AM.  Always use your most recent med list.                   Brand Name Dispense Instructions for use    albuterol 108 (90 BASE) MCG/ACT Inhaler    PROAIR HFA/PROVENTIL HFA/VENTOLIN HFA     Inhale 2 puffs into the lungs every 6 hours       ferrous sulfate 325 (65 FE) MG tablet    IRON     Take by mouth daily (with breakfast)       * HYDROXYZINE HCL PO      Take 25 mg by mouth as needed       * hydrOXYzine 25 MG tablet    ATARAX    60 tablet    Take 1-2 tablets (25-50  mg) by mouth every 6 hours as needed for anxiety       NEXPLANON SC          OMEPRAZOLE PO      Take 20 mg by mouth 2 times daily       * PROZAC 20 MG capsule   Generic drug:  FLUoxetine      Take 20 mg by mouth daily       * FLUoxetine 40 MG capsule    PROzac    90 capsule    Take 1 capsule (40 mg) by mouth daily       REMICADE IV      Inject 500 mg into the vein       * Notice:  This list has 4 medication(s) that are the same as other medications prescribed for you. Read the directions carefully, and ask your doctor or other care provider to review them with you.

## 2017-02-06 NOTE — Clinical Note
My Asthma Action Plan  Name: Kate Uribe   YOB: 1990  Date: 2/6/2017   My doctor: Bibi Gamble   My clinic: [facility]      My Control Medicine: n/a    My Rescue Medicine: Albuterol (Proair/Ventolin/Proventil) HFA      My Asthma Severity: intermittent  Avoid your asthma triggers        GREEN ZONE   Good Control    I feel good    No cough or wheeze    Can work, sleep and play without asthma symptoms       Take your asthma control medicine every day.     1. If exercise triggers your asthma, take your rescue medication    15 minutes before exercise or sports, and    During exercise if you have asthma symptoms  2. Spacer to use with inhaler: If you have a spacer, make sure to use it with your inhaler             YELLOW ZONE Getting Worse  I have ANY of these:    I do not feel good    Cough or wheeze    Chest feels tight    Wake up at night   1. Keep taking your Green Zone medications  2. Start taking your rescue medicine:    every 20 minutes for up to 1 hour. Then every 4 hours for 24-48 hours.  3. If you stay in the Yellow Zone for more than 12-24 hours, contact your doctor.  4. If you do not return to the Green Zone in 12-24 hours or you get worse, start taking your oral steroid medicine if prescribed by your provider.           RED ZONE Medical Alert - Get Help  I have ANY of these:    I feel awful    Medicine is not helping    Breathing getting harder    Trouble walking or talking    Nose opens wide to breathe       1. Take your rescue medicine NOW  2. If your provider has prescribed an oral steroid medicine, start taking it NOW  3. Call your doctor NOW  4. If you are still in the Red Zone after 20 minutes and you have not reached your doctor:    Take your rescue medicine again and    Call 911 or go to the emergency room right away    See your regular doctor within 2 weeks of an Emergency Room or Urgent Care visit for follow-up treatment.        The above medication may be  given at school or day care?: N/A (Adult Patient)  Child can carry and use inhaler(s) at school with approval of school nurse?: N/A (Adult Patient)    Electronically signed by: Barbie Hinkle, February 6, 2017    Annual Reminders:  Meet with Asthma Educator,  Flu Shot in the Fall, consider Pneumonia Vaccination for patients with asthma (aged 19 and older).    Pharmacy: [facility] [place], [place] - [address]                     Asthma Triggers  How To Control Things That Make Your Asthma Worse    Triggers are things that make your asthma worse.  Look at the list below to help you find your triggers and what you can do about them.  You can help prevent asthma flare-ups by staying away from your triggers.      Trigger                                                          What you can do   Cigarette Smoke  Tobacco smoke can make asthma worse. Do not allow smoking in your home, car or around you.  Be sure no one smokes at a child s day care or school.  If you smoke, ask your health care provider for ways to help you quit.  Ask family members to quit too.  Ask your health care provider for a referral to Quit Plan to help you quit smoking, or call 0-551-507-PLAN.     Colds, Flu, Bronchitis  These are common triggers of asthma. Wash your hands often.  Don t touch your eyes, nose or mouth.  Get a flu shot every year.     Dust Mites  These are tiny bugs that live in cloth or carpet. They are too small to see. Wash sheets and blankets in hot water every week.   Encase pillows and mattress in dust mite proof covers.  Avoid having carpet if you can. If you have carpet, vacuum weekly.   Use a dust mask and HEPA vacuum.   Pollen and Outdoor Mold  Some people are allergic to trees, grass, or weed pollen, or molds. Try to keep your windows closed.  Limit time out doors when pollen count is high.   Ask you health care provider about taking medicine during allergy season.     Animal Dander  Some  people are allergic to skin flakes, urine or saliva from pets with fur or feathers. Keep pets with fur or feathers out of your home.    If you can t keep the pet outdoors, then keep the pet out of your bedroom.  Keep the bedroom door closed.  Keep pets off cloth furniture and away from stuffed toys.     Mice, Rats, and Cockroaches  Some people are allergic to the waste from these pests.   Cover food and garbage.  Clean up spills and food crumbs.  Store grease in the refrigerator.   Keep food out of the bedroom.   Indoor Mold  This can be a trigger if your home has high moisture. Fix leaking faucets, pipes, or other sources of water.   Clean moldy surfaces.  Dehumidify basement if it is damp and smelly.   Smoke, Strong Odors, and Sprays  These can reduce air quality. Stay away from strong odors and sprays, such as perfume, powder, hair spray, paints, smoke incense, paint, cleaning products, candles and new carpet.   Exercise or Sports  Some people with asthma have this trigger. Be active!  Ask your doctor about taking medicine before sports or exercise to prevent symptoms.    Warm up for 5-10 minutes before and after sports or exercise.     Other Triggers of Asthma  Cold air:  Cover your nose and mouth with a scarf.  Sometimes laughing or crying can be a trigger.  Some medicines and food can trigger asthma.

## 2017-02-07 LAB
COPATH REPORT: NORMAL
PAP: NORMAL

## 2017-02-07 ASSESSMENT — ASTHMA QUESTIONNAIRES: ACT_TOTALSCORE: 23

## 2017-02-09 PROBLEM — R87.610 ATYPICAL SQUAMOUS CELLS OF UNDETERMINED SIGNIFICANCE ON CYTOLOGIC SMEAR OF CERVIX (ASC-US): Status: RESOLVED | Noted: 2017-02-06 | Resolved: 2017-02-09

## 2017-02-24 ENCOUNTER — MYC MEDICAL ADVICE (OUTPATIENT)
Dept: PEDIATRICS | Facility: CLINIC | Age: 27
End: 2017-02-24

## 2017-02-24 NOTE — TELEPHONE ENCOUNTER
Patient asking if Prozac is safe during pregnancy-she is trying to conceive.  Advised patient per up to date that it may not be safe, crosses placenta, it is category C-Drugs should be given only if the potential benefits justify the potential risk to the fetus.  Lorena Guzman RN  Message handled by Nurse Triage.

## 2017-02-24 NOTE — TELEPHONE ENCOUNTER
Patient advised appointment after huddled with Dr. juan Guzman RN  Message handled by Nurse Triage.

## 2017-02-27 ENCOUNTER — HOSPITAL ENCOUNTER (EMERGENCY)
Facility: CLINIC | Age: 27
Discharge: HOME | End: 2017-02-27
Attending: EMERGENCY MEDICINE | Admitting: EMERGENCY MEDICINE
Payer: COMMERCIAL

## 2017-02-27 ENCOUNTER — APPOINTMENT (OUTPATIENT)
Dept: CT IMAGING | Facility: CLINIC | Age: 27
End: 2017-02-27
Attending: EMERGENCY MEDICINE
Payer: COMMERCIAL

## 2017-02-27 VITALS
SYSTOLIC BLOOD PRESSURE: 116 MMHG | DIASTOLIC BLOOD PRESSURE: 58 MMHG | RESPIRATION RATE: 20 BRPM | OXYGEN SATURATION: 98 % | TEMPERATURE: 98.8 F

## 2017-02-27 DIAGNOSIS — K50.00 CROHN'S ILEITIS, WITHOUT COMPLICATIONS (H): ICD-10-CM

## 2017-02-27 DIAGNOSIS — R19.7 VOMITING AND DIARRHEA: ICD-10-CM

## 2017-02-27 DIAGNOSIS — R11.10 VOMITING AND DIARRHEA: ICD-10-CM

## 2017-02-27 LAB
ALBUMIN SERPL-MCNC: 3.4 G/DL (ref 3.4–5)
ALBUMIN UR-MCNC: NEGATIVE MG/DL
ALP SERPL-CCNC: 105 U/L (ref 40–150)
ALT SERPL W P-5'-P-CCNC: 50 U/L (ref 0–50)
ANION GAP SERPL CALCULATED.3IONS-SCNC: 8 MMOL/L (ref 3–14)
APPEARANCE UR: CLEAR
AST SERPL W P-5'-P-CCNC: 30 U/L (ref 0–45)
BACTERIA #/AREA URNS HPF: ABNORMAL /HPF
BASOPHILS # BLD AUTO: 0 10E9/L (ref 0–0.2)
BASOPHILS NFR BLD AUTO: 0.2 %
BILIRUB SERPL-MCNC: 0.6 MG/DL (ref 0.2–1.3)
BILIRUB UR QL STRIP: NEGATIVE
BUN SERPL-MCNC: 13 MG/DL (ref 7–30)
CALCIUM SERPL-MCNC: 8.3 MG/DL (ref 8.5–10.1)
CHLORIDE SERPL-SCNC: 105 MMOL/L (ref 94–109)
CO2 SERPL-SCNC: 26 MMOL/L (ref 20–32)
COLOR UR AUTO: YELLOW
CREAT SERPL-MCNC: 0.54 MG/DL (ref 0.52–1.04)
DIFFERENTIAL METHOD BLD: ABNORMAL
EOSINOPHIL # BLD AUTO: 0.3 10E9/L (ref 0–0.7)
EOSINOPHIL NFR BLD AUTO: 2.4 %
ERYTHROCYTE [DISTWIDTH] IN BLOOD BY AUTOMATED COUNT: 26.3 % (ref 10–15)
GFR SERPL CREATININE-BSD FRML MDRD: ABNORMAL ML/MIN/1.7M2
GLUCOSE SERPL-MCNC: 80 MG/DL (ref 70–99)
GLUCOSE UR STRIP-MCNC: NEGATIVE MG/DL
HCG UR QL: NEGATIVE
HCT VFR BLD AUTO: 37.6 % (ref 35–47)
HGB BLD-MCNC: 10.9 G/DL (ref 11.7–15.7)
HGB UR QL STRIP: NEGATIVE
IMM GRANULOCYTES # BLD: 0.1 10E9/L (ref 0–0.4)
IMM GRANULOCYTES NFR BLD: 0.5 %
KETONES UR STRIP-MCNC: NEGATIVE MG/DL
LEUKOCYTE ESTERASE UR QL STRIP: NEGATIVE
LIPASE SERPL-CCNC: 103 U/L (ref 73–393)
LYMPHOCYTES # BLD AUTO: 1.5 10E9/L (ref 0.8–5.3)
LYMPHOCYTES NFR BLD AUTO: 13.6 %
MCH RBC QN AUTO: 22.8 PG (ref 26.5–33)
MCHC RBC AUTO-ENTMCNC: 29 G/DL (ref 31.5–36.5)
MCV RBC AUTO: 79 FL (ref 78–100)
MONOCYTES # BLD AUTO: 0.8 10E9/L (ref 0–1.3)
MONOCYTES NFR BLD AUTO: 7.2 %
MUCOUS THREADS #/AREA URNS LPF: PRESENT /LPF
NEUTROPHILS # BLD AUTO: 8.4 10E9/L (ref 1.6–8.3)
NEUTROPHILS NFR BLD AUTO: 76.1 %
NITRATE UR QL: NEGATIVE
NRBC # BLD AUTO: 0 10*3/UL
NRBC BLD AUTO-RTO: 0 /100
PH UR STRIP: 5 PH (ref 5–7)
PLATELET # BLD AUTO: 300 10E9/L (ref 150–450)
POTASSIUM SERPL-SCNC: 3.7 MMOL/L (ref 3.4–5.3)
PROT SERPL-MCNC: 7.2 G/DL (ref 6.8–8.8)
RBC # BLD AUTO: 4.79 10E12/L (ref 3.8–5.2)
RBC #/AREA URNS AUTO: 0 /HPF (ref 0–2)
SODIUM SERPL-SCNC: 139 MMOL/L (ref 133–144)
SP GR UR STRIP: 1.01 (ref 1–1.03)
SQUAMOUS #/AREA URNS AUTO: 2 /HPF (ref 0–1)
URN SPEC COLLECT METH UR: ABNORMAL
UROBILINOGEN UR STRIP-MCNC: NORMAL MG/DL (ref 0–2)
WBC # BLD AUTO: 11 10E9/L (ref 4–11)
WBC #/AREA URNS AUTO: 1 /HPF (ref 0–2)

## 2017-02-27 PROCEDURE — 25000132 ZZH RX MED GY IP 250 OP 250 PS 637: Performed by: EMERGENCY MEDICINE

## 2017-02-27 PROCEDURE — 85025 COMPLETE CBC W/AUTO DIFF WBC: CPT | Performed by: EMERGENCY MEDICINE

## 2017-02-27 PROCEDURE — 25000128 H RX IP 250 OP 636: Performed by: EMERGENCY MEDICINE

## 2017-02-27 PROCEDURE — 74177 CT ABD & PELVIS W/CONTRAST: CPT

## 2017-02-27 PROCEDURE — 81001 URINALYSIS AUTO W/SCOPE: CPT | Performed by: EMERGENCY MEDICINE

## 2017-02-27 PROCEDURE — 96375 TX/PRO/DX INJ NEW DRUG ADDON: CPT

## 2017-02-27 PROCEDURE — 80053 COMPREHEN METABOLIC PANEL: CPT | Performed by: EMERGENCY MEDICINE

## 2017-02-27 PROCEDURE — 99285 EMERGENCY DEPT VISIT HI MDM: CPT | Mod: 25

## 2017-02-27 PROCEDURE — 81025 URINE PREGNANCY TEST: CPT | Performed by: EMERGENCY MEDICINE

## 2017-02-27 PROCEDURE — 25500064 ZZH RX 255 OP 636: Performed by: EMERGENCY MEDICINE

## 2017-02-27 PROCEDURE — 96376 TX/PRO/DX INJ SAME DRUG ADON: CPT

## 2017-02-27 PROCEDURE — 83690 ASSAY OF LIPASE: CPT | Performed by: EMERGENCY MEDICINE

## 2017-02-27 PROCEDURE — 96374 THER/PROPH/DIAG INJ IV PUSH: CPT | Mod: 59

## 2017-02-27 RX ORDER — PREDNISONE 20 MG/1
TABLET ORAL
Qty: 18 TABLET | Refills: 0 | Status: SHIPPED | OUTPATIENT
Start: 2017-02-27 | End: 2017-03-04

## 2017-02-27 RX ORDER — ONDANSETRON 4 MG/1
4-8 TABLET, ORALLY DISINTEGRATING ORAL EVERY 8 HOURS PRN
Qty: 15 TABLET | Refills: 0 | Status: SHIPPED | OUTPATIENT
Start: 2017-02-27 | End: 2017-03-02

## 2017-02-27 RX ORDER — MORPHINE SULFATE 4 MG/ML
4 INJECTION, SOLUTION INTRAMUSCULAR; INTRAVENOUS EVERY 30 MIN PRN
Status: DISCONTINUED | OUTPATIENT
Start: 2017-02-27 | End: 2017-02-27 | Stop reason: HOSPADM

## 2017-02-27 RX ORDER — IOPAMIDOL 755 MG/ML
500 INJECTION, SOLUTION INTRAVASCULAR ONCE
Status: COMPLETED | OUTPATIENT
Start: 2017-02-27 | End: 2017-02-27

## 2017-02-27 RX ORDER — ONDANSETRON 2 MG/ML
4 INJECTION INTRAMUSCULAR; INTRAVENOUS EVERY 30 MIN PRN
Status: DISCONTINUED | OUTPATIENT
Start: 2017-02-27 | End: 2017-02-27 | Stop reason: HOSPADM

## 2017-02-27 RX ORDER — HYDROCODONE BITARTRATE AND ACETAMINOPHEN 5; 325 MG/1; MG/1
1-2 TABLET ORAL EVERY 6 HOURS PRN
Qty: 15 TABLET | Refills: 0 | Status: SHIPPED | OUTPATIENT
Start: 2017-02-27 | End: 2017-03-04

## 2017-02-27 RX ORDER — LOPERAMIDE HCL 2 MG
4 CAPSULE ORAL ONCE
Status: COMPLETED | OUTPATIENT
Start: 2017-02-27 | End: 2017-02-27

## 2017-02-27 RX ADMIN — ONDANSETRON 4 MG: 2 INJECTION INTRAMUSCULAR; INTRAVENOUS at 09:56

## 2017-02-27 RX ADMIN — IOPAMIDOL 100 ML: 755 INJECTION, SOLUTION INTRAVENOUS at 10:24

## 2017-02-27 RX ADMIN — MORPHINE SULFATE 4 MG: 4 INJECTION, SOLUTION INTRAMUSCULAR; INTRAVENOUS at 10:01

## 2017-02-27 RX ADMIN — SODIUM CHLORIDE 65 ML: 9 INJECTION, SOLUTION INTRAVENOUS at 10:25

## 2017-02-27 RX ADMIN — LOPERAMIDE HYDROCHLORIDE 4 MG: 2 CAPSULE ORAL at 09:55

## 2017-02-27 RX ADMIN — MORPHINE SULFATE 4 MG: 4 INJECTION, SOLUTION INTRAMUSCULAR; INTRAVENOUS at 11:19

## 2017-02-27 ASSESSMENT — ENCOUNTER SYMPTOMS
ABDOMINAL PAIN: 1
VOMITING: 1
BACK PAIN: 1
BLOOD IN STOOL: 0
FEVER: 0
HEMATURIA: 0
DIARRHEA: 1

## 2017-02-27 NOTE — LETTER
[facility] EMERGENCY DEPARTMENT  [address] [place] [address]  [phone]    Kate Uribe  [address] [place] [address]  [phone] (home) NONE (work)    : 1990      To Whom it may concern:    Kate Uribe was seen in our Emergency Department today, 2017.  I expect her condition to improve over the next 3 days.  She may return to work/school when improved.    Sincerely,        Ginna Mojica

## 2017-02-27 NOTE — ED AVS SNAPSHOT
[facility] Emergency Department    [address]    [facility] [address]    Phone:  [phone]    Fax:  [phone]                                       Kate Uribe   MRN: [id number]    Department:  [facility] Emergency Department   Date of Visit:  2/27/2017           Patient Information     Date Of Birth          1990        Your diagnoses for this visit were:     Crohn's ileitis, without complications (H)     Vomiting and diarrhea        You were seen by Anastasia Torre MD.      Follow-up Information     Follow up with SWATHI NICKERSON-NHUNG.    Why:  This week    Contact information:    [address]  [address]  [place] [address] [phone]        Follow up with [facility] Emergency Department.    Specialty:  EMERGENCY MEDICINE    Why:  As needed, If symptoms worsen    Contact information:    [address] [facility] [address] [phone]      Discharge References/Attachments     CROHN'S DISEASE, DISCHARGE INSTRUCTIONS FOR (ENGLISH)    VOMITING AND DIARRHEA, SELF-CARE FOR (ENGLISH)      Future Appointments        Provider Department Dept Phone Center    3/18/2017 11:00 AM Bharath San MD [facility] [phone] [facility]      24 Hour Appointment Hotline       To make an appointment at any [place] clinic, call [phone] ([phone]). If you don't have a family doctor or clinic, we will help you find one. [place] clinics are conveniently located to serve the needs of you and your family.             Review of your medicines      START taking        Dose / Directions Last dose taken    HYDROcodone-acetaminophen 5-325 MG per tablet   Commonly known as:  NORCO   Dose:  1-2 tablet   Quantity:  15 tablet        Take 1-2 tablets by mouth every 6 hours as needed for moderate to severe pain   Refills:  0        ondansetron 4 MG ODT tab   Commonly known as:  ZOFRAN ODT    Dose:  4-8 mg   Quantity:  15 tablet        Take 1-2 tablets (4-8 mg) by mouth every 8 hours as needed for nausea   Refills:  0        predniSONE 20 MG tablet   Commonly known as:  DELTASONE   Quantity:  18 tablet        Take 30mg (1.5 tabs) daily for 1 week then 20mg (1 tab) daily for 1 week   Refills:  0          Our records show that you are taking the medicines listed below. If these are incorrect, please call your family doctor or clinic.        Dose / Directions Last dose taken    albuterol 108 (90 BASE) MCG/ACT Inhaler   Commonly known as:  PROAIR HFA/PROVENTIL HFA/VENTOLIN HFA   Dose:  2 puff        Inhale 2 puffs into the lungs every 6 hours   Refills:  0        ferrous sulfate 325 (65 FE) MG tablet   Commonly known as:  IRON        Take by mouth daily (with breakfast)   Refills:  0        * HYDROXYZINE HCL PO   Dose:  25 mg        Take 25 mg by mouth as needed   Refills:  0        * hydrOXYzine 25 MG tablet   Commonly known as:  ATARAX   Dose:  25-50 mg   Quantity:  60 tablet        Take 1-2 tablets (25-50 mg) by mouth every 6 hours as needed for anxiety   Refills:  1        * PROZAC 20 MG capsule   Dose:  20 mg   Generic drug:  FLUoxetine        Take 20 mg by mouth daily   Refills:  0        * FLUoxetine 40 MG capsule   Commonly known as:  PROzac   Dose:  40 mg   Quantity:  90 capsule        Take 1 capsule (40 mg) by mouth daily   Refills:  1        REMICADE IV   Dose:  500 mg        Inject 500 mg into the vein   Refills:  0        * Notice:  This list has 4 medication(s) that are the same as other medications prescribed for you. Read the directions carefully, and ask your doctor or other care provider to review them with you.            Prescriptions were sent or printed at these locations (3 Prescriptions)                   Other Prescriptions                Printed at Department/Unit printer (3 of 3)         ondansetron (ZOFRAN ODT) 4 MG ODT tab               HYDROcodone-acetaminophen (NORCO) 5-325  MG per tablet               predniSONE (DELTASONE) 20 MG tablet                Procedures and tests performed during your visit     CBC with platelets differential    CT Abdomen Pelvis w Contrast    Comprehensive metabolic panel    HCG qualitative urine    Lipase    UA with Microscopic reflex to Culture      Orders Needing Specimen Collection     None      Pending Results     No orders found from 2/25/2017 to 2/28/2017.            Pending Culture Results     No orders found from 2/25/2017 to 2/28/2017.             Test Results from your hospital stay     2/27/2017  9:16 AM - Interface, Flexilab Results      Component Results     Component Value Ref Range & Units Status    Color Urine Yellow  Final    Appearance Urine Clear  Final    Glucose Urine Negative NEG mg/dL Final    Bilirubin Urine Negative NEG Final    Ketones Urine Negative NEG mg/dL Final    Specific Gravity Urine 1.015 1.003 - 1.035 Final    Blood Urine Negative NEG Final    pH Urine 5.0 5.0 - 7.0 pH Final    Protein Albumin Urine Negative NEG mg/dL Final    Urobilinogen mg/dL Normal 0.0 - 2.0 mg/dL Final    Nitrite Urine Negative NEG Final    Leukocyte Esterase Urine Negative NEG Final    Source Midstream Urine  Final    WBC Urine 1 0 - 2 /HPF Final    RBC Urine 0 0 - 2 /HPF Final    Bacteria Urine Few (A) NEG /HPF Final    Squamous Epithelial /HPF Urine 2 (H) 0 - 1 /HPF Final    Mucous Urine Present (A) NEG /LPF Final         2/27/2017  9:18 AM - Interface, Flexilab Results      Component Results     Component Value Ref Range & Units Status    HCG Qual Urine Negative NEG Final         2/27/2017 10:48 AM - Interface, Flexilab Results      Component Results     Component Value Ref Range & Units Status    WBC 11.0 4.0 - 11.0 10e9/L Final    RBC Count 4.79 3.8 - 5.2 10e12/L Final    Hemoglobin 10.9 (L) 11.7 - 15.7 g/dL Final    Hematocrit 37.6 35.0 - 47.0 % Final    MCV 79 78 - 100 fl Final    MCH 22.8 (L) 26.5 - 33.0 pg Final    MCHC 29.0 (L) 31.5 - 36.5  g/dL Final    RDW 26.3 (H) 10.0 - 15.0 % Final    Platelet Count 300 150 - 450 10e9/L Final    Diff Method Automated Method  Final    % Neutrophils 76.1 % Final    % Lymphocytes 13.6 % Final    % Monocytes 7.2 % Final    % Eosinophils 2.4 % Final    % Basophils 0.2 % Final    % Immature Granulocytes 0.5 % Final    Nucleated RBCs 0 0 /100 Final    Absolute Neutrophil 8.4 (H) 1.6 - 8.3 10e9/L Final    Absolute Lymphocytes 1.5 0.8 - 5.3 10e9/L Final    Absolute Monocytes 0.8 0.0 - 1.3 10e9/L Final    Absolute Eosinophils 0.3 0.0 - 0.7 10e9/L Final    Absolute Basophils 0.0 0.0 - 0.2 10e9/L Final    Abs Immature Granulocytes 0.1 0 - 0.4 10e9/L Final    Absolute Nucleated RBC 0.0  Final         2/27/2017 11:24 AM - Interface, Flexilab Results      Component Results     Component Value Ref Range & Units Status    Sodium 139 133 - 144 mmol/L Final    Potassium 3.7 3.4 - 5.3 mmol/L Final    Chloride 105 94 - 109 mmol/L Final    Carbon Dioxide 26 20 - 32 mmol/L Final    Anion Gap 8 3 - 14 mmol/L Final    Glucose 80 70 - 99 mg/dL Final    Urea Nitrogen 13 7 - 30 mg/dL Final    Creatinine 0.54 0.52 - 1.04 mg/dL Final    GFR Estimate >90  Non  GFR Calc   >60 mL/min/1.7m2 Final    GFR Estimate If Black >90   GFR Calc   >60 mL/min/1.7m2 Final    Calcium 8.3 (L) 8.5 - 10.1 mg/dL Final    Bilirubin Total 0.6 0.2 - 1.3 mg/dL Final    Albumin 3.4 3.4 - 5.0 g/dL Final    Protein Total 7.2 6.8 - 8.8 g/dL Final    Alkaline Phosphatase 105 40 - 150 U/L Final    ALT 50 0 - 50 U/L Final    AST 30 0 - 45 U/L Final         2/27/2017 11:24 AM - Interface, Flexilab Results      Component Results     Component Value Ref Range & Units Status    Lipase 103 73 - 393 U/L Final         2/27/2017 11:46 AM - Interface, Radiant Ib      Narrative     CT ABDOMEN AND PELVIS WITH CONTRAST  2/27/2017 10:31 AM     HISTORY: Epigastric and periumbilical pain.    TECHNIQUE: 100 mL Isovue-370 IV were administered. After  contrast  administration, volumetric helical sections were acquired from the  lung bases to the ischial tuberosities. Coronal images were also  reconstructed. Radiation dose for this scan was reduced using  automated exposure control, adjustment of the mA and/or kV according  to patient size, or iterative reconstruction technique.    COMPARISON: None.     FINDINGS: There is an approximately 11 cm segment of moderately  thickened terminal ileum, with mild surrounding inflammatory  stranding. Findings are consistent with a terminal ileitis, most  likely infectious or inflammatory in etiology. No convincing evidence  for associated bowel obstruction.  Contrast material is noted to pass  through this thickened segment of terminal ileum into the colon. The  colon is otherwise unremarkable. Unremarkable appendix. There is a  small amount of nonspecific free fluid in the pelvis.    The gallbladder is mildly distended. The liver, gallbladder, spleen,  adrenal glands, pancreas, and kidneys are otherwise unremarkable. No  hydronephrosis. Small hiatal hernia. The visualized lung bases are  clear.        Impression     IMPRESSION:   1. Moderately thickened segment of terminal ileum measures  approximately 11 cm in length. Findings are consistent with a terminal  ileitis, most likely infectious or inflammatory in etiology. Active  Crohn's inflammation could have this appearance.  2. Small amount of free fluid in the pelvis is nonspecific, and may be  physiologic.  3. Mild gallbladder distention.    TAM JENSEN MD                Clinical Quality Measure: Blood Pressure Screening     Your blood pressure was checked while you were in the emergency department today. The last reading we obtained was  BP: 116/58 . Please read the guidelines below about what these numbers mean and what you should do about them.  If your systolic blood pressure (the top number) is less than 120 and your diastolic blood pressure (the bottom number)  is less than 80, then your blood pressure is normal. There is nothing more that you need to do about it.  If your systolic blood pressure (the top number) is 120-139 or your diastolic blood pressure (the bottom number) is 80-89, your blood pressure may be higher than it should be. You should have your blood pressure rechecked within a year by a primary care provider.  If your systolic blood pressure (the top number) is 140 or greater or your diastolic blood pressure (the bottom number) is 90 or greater, you may have high blood pressure. High blood pressure is treatable, but if left untreated over time it can put you at risk for heart attack, stroke, or kidney failure. You should have your blood pressure rechecked by a primary care provider within the next 4 weeks.  If your provider in the emergency department today gave you specific instructions to follow-up with your doctor or provider even sooner than that, you should follow that instruction and not wait for up to 4 weeks for your follow-up visit.        Thank you for choosing [facility]       Thank you for choosing [facility] for your care. Our goal is always to provide you with excellent care. Hearing back from our patients is one way we can continue to improve our services. Please take a few minutes to complete the written survey that you may receive in the mail after you visit with us. Thank you!        [facility] Information     [facility] gives you secure access to your electronic health record. If you see a primary care provider, you can also send messages to your care team and make appointments. If you have questions, please call your primary care clinic.  If you do not have a primary care provider, please call [phone] and they will assist you.        Care EveryWhere ID     This is your Care EveryWhere ID. This could be used by other organizations to access your [facility] medical records  [id number]        After Visit Summary       This is your record. Keep  this with you and show to your community pharmacist(s) and doctor(s) at your next visit.

## 2017-02-27 NOTE — ED PROVIDER NOTES
History     Chief Complaint:  Abdominal Pain    HPI   Kate Uribe is a 26 year old female with a history of Crohn's, who presents with epigastric pain which radiates to her back. Her symptoms began at 1900 last night. In addition to her abdominal pain, she also experienced vomiting and diarrhea. She estimates more than 10 episodes of diarrhea, and 3-4x vomiting. She took tylenol for her symptoms, but vomited shortly after taking it. She is not supposed to take ibuprofen with her Crohn's. The patient does report that there is potential she is pregnant. No recent antibiotic use. Denies blood in her vomit or urine, and fevers.     The patient was diagnosed with her Crohn's disease in May 2016. Her symptoms that led to this diagnosis was the same abdominal pain that radiated to her back, albeit in a slightly different location. She is on Remicade for her Crohn's.  Sees [facility].    Allergies:  Penicillins     Medications:    Prozac  Remicade  Albuterol  Atarax  Prozac  Hydroxyzine HCl  Iron     Past Medical History:    Anemia  Asthma   Heart murmur  GERD  Crohn's disease    Past Surgical History:    Breast reduction surgery      Family History:    Diabetes  HTN  HLD  Gallbladder disease    Social History:  Relationship status:   Tobacco use: Negative  Alcohol use: Positive  The patient presents alone.     Review of Systems   Constitutional: Negative for fever.   Gastrointestinal: Positive for abdominal pain, diarrhea and vomiting. Negative for blood in stool.   Genitourinary: Negative for hematuria.   Musculoskeletal: Positive for back pain.   All other systems reviewed and are negative.      Physical Exam   First Vitals:  BP: 130/71  Heart Rate: 85  Temp: 98.8  F (37.1  C)  Resp: 20  SpO2: 96 %    Physical Exam  Eyes:  Sclera white; Pupils are equal and round  ENT:    External ears and nares normal  CV:  Regular rate and rhythm, No murmur   Resp:  Breath sounds clear and equal  bilaterally    Non-labored, no retractions or accessory muscle use  GI:  Abdomen is soft, tenderness epigastric, periumbilical, and RUQ with negative Hirsch's    No rebound tenderness or peritoneal features  MS:  Moves all extremities  Skin:  Warm and dry  Neuro: Speech is normal and fluent. No apparent deficit.          Emergency Department Course     Imaging:  Radiographic findings were communicated with the patient who voiced understanding of the findings.    CT Abdomen and Pelvis, w contrast, per radiology:   1. Moderately thickened segment of terminal ileum measures approximately 11 cm in length. Findings are consistent with a terminal ileitis, most likely infectious or inflammatory in etiology. Active  Crohn's inflammation could have this appearance.  2. Small amount of free fluid in the pelvis is nonspecific, and may be physiologic.  3. Mild gallbladder distention.    Laboratory:  CBC: WBC 11.0, HGB 10.9 (L),   CMP: Calcium 8.3 (L), o/w WNL (Creatinine 0.54)  Lipase: 103  HCG Qualitative urine: Negative  UA: Clear, yellow urine: Bacteria few (A), Squamous epithelial/HPF 2 (H), Mucous present (A), o/w WNL     Interventions:  0955: Imodium, 4 mL, PO  0956: Zofran, 4 mg, IV injection  1000: Omnipaque, 25 mg, PO'  1001: Morphine, 4 mg, IV injection  1025: Normal saline, 65 mL, IV    Emergency Department Course:  Nursing notes and vitals reviewed.  I performed an exam of the patient as documented above.  The above workup was undertaken.  1217: I discussed the patient with Dr. Gore of GI.   1233: I rechecked the patient and discussed results.    Findings and plan explained to the Patient. Patient discharged home, status improved, with instructions regarding supportive care, medications, and reasons to return as well as the importance of close follow-up was reviewed.      Impression & Plan      Medical Decision Making:  Kate Uribe is a 26 year old female here for evaluation of abdominal pain, with  associated vomiting and diarrhea. Differential includes Crohn s flare, complications such as fistula or abscess, obstruction, appendicitis, biliary pathology, hepatitis, and pancreatitis. IV was established and symptomatic medications given with improvement. CT abnormalities were discussed with her, as well as with her GI group. Since she is currently on Remicade, medications such as 5-ASA are unlikely to prove additional benefit. She has a normal WBC count and no fevers so antibiotics are not felt to be indicated. Steroid taper was recommended (1 week each 30, 20, 15, 10, 5mg), as well as close follow up in the next several days in GI clinic. Rather than prescribe the entire course of steroid taper, I will prescribe the first two weeks for her, and the additional can be prescribed by GI clinic which is her preference. She understands and agrees with this plan. She will return immediately for worsening or uncomfortable symptoms.     Diagnosis:    ICD-10-CM   1. Crohn's ileitis, without complications (H) K50.00   2. Vomiting and diarrhea R11.10    R19.7     Disposition:  Discharge to home with GI follow up.     Discharge Medications:   HYDROCODONE-ACETAMINOPHEN (NORCO) 5-325 MG PER TABLET    Take 1-2 tablets by mouth every 6 hours as needed for moderate to severe pain    ONDANSETRON (ZOFRAN ODT) 4 MG ODT TAB    Take 1-2 tablets (4-8 mg) by mouth every 8 hours as needed for nausea    PREDNISONE (DELTASONE) 20 MG TABLET    Take 30mg (1.5 tabs) daily for 1 week then 20mg (1 tab) daily for 1 week     Sin MYLES, am serving as a scribe on 2/27/2017 at 9:08 AM to personally document services performed by Anastasia Torre MD, based on my observations and the provider's statements to me.    [facility] EMERGENCY DEPARTMENT       Anastasia Torre MD  02/28/17 0989

## 2017-02-27 NOTE — ED AVS SNAPSHOT
[facility] Emergency Department    [address]    [facility] [address]    Phone:  [phone]    Fax:  [phone]                                       Kate Uribe   MRN: [id number]    Department:  [facility] Emergency Department   Date of Visit:  2/27/2017           After Visit Summary Signature Page     I have received my discharge instructions, and my questions have been answered. I have discussed any challenges I see with this plan with the nurse or doctor.    ..........................................................................................................................................  Patient/Patient Representative Signature      ..........................................................................................................................................  Patient Representative Print Name and Relationship to Patient    ..................................................               ................................................  Date                                            Time    ..........................................................................................................................................  Reviewed by Signature/Title    ...................................................              ..............................................  Date                                                            Time

## 2017-02-27 NOTE — ED NOTES
Pt has abdominal pain since 1900 last evening radiating around into back.  She reports area of moist pain is in mid upper area.  SHe is nauseated and vomiting and diarrhea.  No blood noted.  She has hx of crohn's disease.

## 2017-02-28 ENCOUNTER — TRANSFERRED RECORDS (OUTPATIENT)
Dept: HEALTH INFORMATION MANAGEMENT | Facility: CLINIC | Age: 27
End: 2017-02-28

## 2017-03-04 ENCOUNTER — OFFICE VISIT (OUTPATIENT)
Dept: URGENT CARE | Facility: URGENT CARE | Age: 27
End: 2017-03-04
Payer: COMMERCIAL

## 2017-03-04 VITALS
OXYGEN SATURATION: 100 % | DIASTOLIC BLOOD PRESSURE: 68 MMHG | SYSTOLIC BLOOD PRESSURE: 126 MMHG | TEMPERATURE: 98.2 F | HEART RATE: 84 BPM

## 2017-03-04 DIAGNOSIS — R30.0 DYSURIA: ICD-10-CM

## 2017-03-04 DIAGNOSIS — N30.01 ACUTE CYSTITIS WITH HEMATURIA: Primary | ICD-10-CM

## 2017-03-04 LAB
ALBUMIN UR-MCNC: ABNORMAL MG/DL
APPEARANCE UR: CLEAR
BACTERIA #/AREA URNS HPF: ABNORMAL /HPF
BILIRUB UR QL STRIP: NEGATIVE
COLOR UR AUTO: YELLOW
GLUCOSE UR STRIP-MCNC: NEGATIVE MG/DL
HGB UR QL STRIP: ABNORMAL
KETONES UR STRIP-MCNC: ABNORMAL MG/DL
LEUKOCYTE ESTERASE UR QL STRIP: ABNORMAL
NITRATE UR QL: NEGATIVE
NON-SQ EPI CELLS #/AREA URNS LPF: ABNORMAL /LPF
PH UR STRIP: 7 PH (ref 5–7)
RBC #/AREA URNS AUTO: ABNORMAL /HPF (ref 0–2)
SP GR UR STRIP: 1.02 (ref 1–1.03)
URN SPEC COLLECT METH UR: ABNORMAL
UROBILINOGEN UR STRIP-ACNC: 0.2 EU/DL (ref 0.2–1)
WBC #/AREA URNS AUTO: >100 /HPF (ref 0–2)

## 2017-03-04 PROCEDURE — 81001 URINALYSIS AUTO W/SCOPE: CPT | Performed by: FAMILY MEDICINE

## 2017-03-04 PROCEDURE — 99213 OFFICE O/P EST LOW 20 MIN: CPT | Performed by: FAMILY MEDICINE

## 2017-03-04 PROCEDURE — 87086 URINE CULTURE/COLONY COUNT: CPT | Performed by: FAMILY MEDICINE

## 2017-03-04 RX ORDER — SULFAMETHOXAZOLE/TRIMETHOPRIM 800-160 MG
1 TABLET ORAL 2 TIMES DAILY
Qty: 6 TABLET | Refills: 0 | Status: SHIPPED | OUTPATIENT
Start: 2017-03-04 | End: 2017-03-07

## 2017-03-04 NOTE — MR AVS SNAPSHOT
After Visit Summary   3/4/2017    Kate Uribe    MRN: [id number]           Patient Information     Date Of Birth          1990        Visit Information        Provider Department      3/4/2017 6:30 PM Krish Galeas MD [facility]        Today's Diagnoses     Acute cystitis with hematuria    -  1    Dysuria          Care Instructions    Drink plenty of liquids    follow up if any fevers/kidney pain/vomiting appears.          Follow-ups after your visit        Your next 10 appointments already scheduled     Mar 13, 2017  7:40 AM CDT   SHORT with Bibi Gamble MD   [facility] ([facility])    [address]  [address]  [facility] [address] [phone]            Mar 18, 2017 11:00 AM CDT   (Arrive by 10:45 AM)   New Patient Visit with Bharath San MD   [facility] ([facility] [place])    [address]  4th Floor  [facility] [address] [phone]              Who to contact     If you have questions or need follow up information about today's clinic visit or your schedule please contact [facility] directly at [phone].  Normal or non-critical lab and imaging results will be communicated to you by MyChart, letter or phone within 4 business days after the clinic has received the results. If you do not hear from us within 7 days, please contact the clinic through MyChart or phone. If you have a critical or abnormal lab result, we will notify you by phone as soon as possible.  Submit refill requests through [employer] or call your pharmacy and they will forward the refill request to us. Please allow 3 business days for your refill to be completed.          Additional Information About Your Visit        Use[employer]ogetherhart Information     [employer] gives you secure access to your electronic health record. If you see a primary care provider, you can also send messages to  your care team and make appointments. If you have questions, please call your primary care clinic.  If you do not have a primary care provider, please call [phone] and they will assist you.        Care EveryWhere ID     This is your Care EveryWhere ID. This could be used by other organizations to access your [facility] medical records  [id number]        Your Vitals Were     Pulse Temperature Pulse Oximetry             84 98.2  F (36.8  C) (Oral) 100%          Blood Pressure from Last 3 Encounters:   03/04/17 126/68   02/27/17 116/58   02/06/17 108/66    Weight from Last 3 Encounters:   02/06/17 244 lb (110.7 kg)   01/24/17 245 lb 12.8 oz (111.5 kg)   10/12/13 240 lb (108.9 kg)              We Performed the Following     UA reflex to Microscopic and Culture     Urine Culture Aerobic Bacterial     Urine Microscopic          Today's Medication Changes          These changes are accurate as of: 3/4/17  6:50 PM.  If you have any questions, ask your nurse or doctor.               Start taking these medicines.        Dose/Directions    sulfamethoxazole-trimethoprim 800-160 MG per tablet   Commonly known as:  BACTRIM DS/SEPTRA DS   Used for:  Acute cystitis with hematuria   Started by:  Krish Galeas MD        Dose:  1 tablet   Take 1 tablet by mouth 2 times daily for 3 days   Quantity:  6 tablet   Refills:  0            Where to get your medicines      These medications were sent to [facility] [address] [place] [address] AT [address] [address]    Hours:  24-hours Phone:  [phone]     sulfamethoxazole-trimethoprim 800-160 MG per tablet                Primary Care Provider Office Phone # Fax #    Bibi Gamble [phone]       [facility] [address] [place] [address] [address] [place] [address]        Thank you!     Thank you for choosing [facility]  for your care. Our goal is always to provide you  with excellent care. Hearing back from our patients is one way we can continue to improve our services. Please take a few minutes to complete the written survey that you may receive in the mail after your visit with us. Thank you!             Your Updated Medication List - Protect others around you: Learn how to safely use, store and throw away your medicines at www.disposemymeds.org.          This list is accurate as of: 3/4/17  6:50 PM.  Always use your most recent med list.                   Brand Name Dispense Instructions for use    albuterol 108 (90 BASE) MCG/ACT Inhaler    PROAIR HFA/PROVENTIL HFA/VENTOLIN HFA     Inhale 2 puffs into the lungs every 6 hours       ferrous sulfate 325 (65 FE) MG tablet    IRON     Take by mouth daily (with breakfast)       FLUoxetine 40 MG capsule    PROzac    90 capsule    Take 1 capsule (40 mg) by mouth daily       hydrOXYzine 25 MG tablet    ATARAX    60 tablet    Take 1-2 tablets (25-50 mg) by mouth every 6 hours as needed for anxiety       REMICADE IV      Inject 500 mg into the vein       sulfamethoxazole-trimethoprim 800-160 MG per tablet    BACTRIM DS/SEPTRA DS    6 tablet    Take 1 tablet by mouth 2 times daily for 3 days

## 2017-03-05 LAB
BACTERIA SPEC CULT: NORMAL
MICRO REPORT STATUS: NORMAL
SPECIMEN SOURCE: NORMAL

## 2017-03-05 NOTE — NURSING NOTE
"Chief Complaint   Patient presents with     Urgent Care     UTI     dysuria x 1 day       Initial /68 (BP Location: Right arm, Patient Position: Chair, Cuff Size: Adult Regular)  Pulse 84  Temp 98.2  F (36.8  C) (Oral)  SpO2 100% Estimated body mass index is 43.22 kg/(m^2) as calculated from the following:    Height as of 2/6/17: 5' 3\" (1.6 m).    Weight as of 2/6/17: 244 lb (110.7 kg).  Medication Reconciliation: jaspal Renteria CMA                                3/4/2017 6:38 PM     "

## 2017-03-05 NOTE — PROGRESS NOTES
SUBJECTIVE:   Kate Uribe is a 27 year old female who  presents today for a possible UTI. Symptoms of dysuria have been going on since today..  Hematuria no.  still presentand.  There is no history of fever, chills, nausea or vomiting.This patient does not have a history of recurrent urinary tract infections.      Past Medical History   Diagnosis Date     Anemia      Asthma      Crohn disease (H)      Heart murmur      Current Outpatient Prescriptions   Medication Sig Dispense Refill     FLUoxetine (PROZAC) 40 MG capsule Take 1 capsule (40 mg) by mouth daily 90 capsule 1     hydrOXYzine (ATARAX) 25 MG tablet Take 1-2 tablets (25-50 mg) by mouth every 6 hours as needed for anxiety 60 tablet 1     InFLIXimab (REMICADE IV) Inject 500 mg into the vein       ferrous sulfate (IRON) 325 (65 FE) MG tablet Take by mouth daily (with breakfast)       albuterol (PROAIR HFA, PROVENTIL HFA, VENTOLIN HFA) 108 (90 BASE) MCG/ACT inhaler Inhale 2 puffs into the lungs every 6 hours       [DISCONTINUED] FLUoxetine (PROZAC) 20 MG capsule Take 20 mg by mouth daily       Social History   Substance Use Topics     Smoking status: Never Smoker     Smokeless tobacco: Not on file     Alcohol use 0.0 - 1.2 oz/week     0 - 2 Standard drinks or equivalent per week       ROS:   Review of systems negative except as stated above.    OBJECTIVE:  /68 (BP Location: Right arm, Patient Position: Chair, Cuff Size: Adult Regular)  Pulse 84  Temp 98.2  F (36.8  C) (Oral)  SpO2 100%  GENERAL APPEARANCE: healthy, alert and no distress    ASSESSMENT:   Lower, uncomplicated urinary tract infection.  Acute Cystitis with Hematuria    PLAN:  Rx:  Bactrim DS  As per ordered above  Drink plenty of fluids.    Follow up with primary care physician if fevers/vomiting/kidney pain appears.   Urine culture pending.    Krish Galeas MD

## 2017-03-06 ENCOUNTER — MYC MEDICAL ADVICE (OUTPATIENT)
Dept: PEDIATRICS | Facility: CLINIC | Age: 27
End: 2017-03-06

## 2017-03-06 DIAGNOSIS — B00.1 COLD SORE: Primary | ICD-10-CM

## 2017-03-06 RX ORDER — VALACYCLOVIR HYDROCHLORIDE 1 G/1
2000 TABLET, FILM COATED ORAL 2 TIMES DAILY
Qty: 4 TABLET | Refills: 0 | Status: SHIPPED | OUTPATIENT
Start: 2017-03-06 | End: 2017-03-13

## 2017-03-13 ENCOUNTER — OFFICE VISIT (OUTPATIENT)
Dept: PEDIATRICS | Facility: CLINIC | Age: 27
End: 2017-03-13
Payer: COMMERCIAL

## 2017-03-13 VITALS
TEMPERATURE: 98.3 F | HEART RATE: 80 BPM | WEIGHT: 250.3 LBS | DIASTOLIC BLOOD PRESSURE: 60 MMHG | SYSTOLIC BLOOD PRESSURE: 116 MMHG | HEIGHT: 63 IN | BODY MASS INDEX: 44.35 KG/M2

## 2017-03-13 DIAGNOSIS — F41.9 ANXIETY: Primary | ICD-10-CM

## 2017-03-13 PROCEDURE — 99214 OFFICE O/P EST MOD 30 MIN: CPT | Performed by: PEDIATRICS

## 2017-03-13 NOTE — NURSING NOTE
"Chief Complaint   Patient presents with     Recheck Medication       Initial /60  Pulse 80  Temp 98.3  F (36.8  C) (Oral)  Ht 5' 3\" (1.6 m)  Wt 250 lb 4.8 oz (113.5 kg)  LMP 02/21/2017  BMI 44.34 kg/m2 Estimated body mass index is 44.34 kg/(m^2) as calculated from the following:    Height as of this encounter: 5' 3\" (1.6 m).    Weight as of this encounter: 250 lb 4.8 oz (113.5 kg).  Medication Reconciliation: complete   Miriam Lima LPN    "

## 2017-03-13 NOTE — PATIENT INSTRUCTIONS
Take 20mg prozac x 1 week. Then take every other day x 1 week.    Please let me know if we need to restart this if you do get pregnancy and anxiety increases.

## 2017-03-13 NOTE — MR AVS SNAPSHOT
After Visit Summary   3/13/2017    Kate Uribe    MRN: [id number]           Patient Information     Date Of Birth          1990        Visit Information        Provider Department      3/13/2017 7:40 AM [facility] MD [facility]        Today's Diagnoses     Anxiety    -  1      Care Instructions    Take 20mg prozac x 1 week. Then take every other day x 1 week.    Please let me know if we need to restart this if you do get pregnancy and anxiety increases.        Follow-ups after your visit        Your next 10 appointments already scheduled     Mar 18, 2017 11:00 AM CDT   (Arrive by 10:45 AM)   New Patient Visit with Bharath San MD   [facility] ([facility] [place])    [address] [address] [phone]              Who to contact     If you have questions or need follow up information about today's clinic visit or your schedule please contact [facility] directly at [phone].  Normal or non-critical lab and imaging results will be communicated to you by MyChart, letter or phone within 4 business days after the clinic has received the results. If you do not hear from us within 7 days, please contact the clinic through [employer] or phone. If you have a critical or abnormal lab result, we will notify you by phone as soon as possible.  Submit refill requests through Medical Connections or call your pharmacy and they will forward the refill request to us. Please allow 3 business days for your refill to be completed.          Additional Information About Your Visit        [employer] Information     Medical Connections gives you secure access to your electronic health record. If you see a primary care provider, you can also send messages to your care team and make appointments. If you have questions, please call your primary care clinic.  If you do not have a primary care provider, please call  "[phone] and they will assist you.        Care EveryWhere ID     This is your Care EveryWhere ID. This could be used by other organizations to access your [facility] medical records  [id number]        Your Vitals Were     Pulse Temperature Height Last Period BMI (Body Mass Index)       80 98.3  F (36.8  C) (Oral) 5' 3\" (1.6 m) 02/21/2017 44.34 kg/m2        Blood Pressure from Last 3 Encounters:   03/13/17 116/60   03/04/17 126/68   02/27/17 116/58    Weight from Last 3 Encounters:   03/13/17 250 lb 4.8 oz (113.5 kg)   02/06/17 244 lb (110.7 kg)   01/24/17 245 lb 12.8 oz (111.5 kg)              Today, you had the following     No orders found for display         Today's Medication Changes          These changes are accurate as of: 3/13/17  7:59 AM.  If you have any questions, ask your nurse or doctor.               These medicines have changed or have updated prescriptions.        Dose/Directions    * FLUoxetine 40 MG capsule   Commonly known as:  PROzac   This may have changed:  Another medication with the same name was added. Make sure you understand how and when to take each.   Used for:  Anxiety   Changed by:  Bibi Gamble MD        Dose:  40 mg   Take 1 capsule (40 mg) by mouth daily   Quantity:  90 capsule   Refills:  1       * FLUoxetine 20 MG capsule   Commonly known as:  PROzac   This may have changed:  You were already taking a medication with the same name, and this prescription was added. Make sure you understand how and when to take each.   Used for:  Anxiety   Changed by:  Bibi Gamble MD        Dose:  20 mg   Take 1 capsule (20 mg) by mouth daily   Quantity:  30 capsule   Refills:  1       * Notice:  This list has 2 medication(s) that are the same as other medications prescribed for you. Read the directions carefully, and ask your doctor or other care provider to review them with you.         Where to get your medicines      These medications were sent to [facility] - " [place] - [address]   [address], [place] [address]     Phone:  [phone]     FLUoxetine 20 MG capsule                Primary Care Provider Office Phone # Fax #    Bibi Gamble [phone]       [facility] [place] [address] [place] [address]        Thank you!     Thank you for choosing [facility]  for your care. Our goal is always to provide you with excellent care. Hearing back from our patients is one way we can continue to improve our services. Please take a few minutes to complete the written survey that you may receive in the mail after your visit with us. Thank you!             Your Updated Medication List - Protect others around you: Learn how to safely use, store and throw away your medicines at www.disposemymeds.org.          This list is accurate as of: 3/13/17  7:59 AM.  Always use your most recent med list.                   Brand Name Dispense Instructions for use    albuterol 108 (90 BASE) MCG/ACT Inhaler    PROAIR HFA/PROVENTIL HFA/VENTOLIN HFA     Inhale 2 puffs into the lungs every 6 hours       ferrous sulfate 325 (65 FE) MG tablet    IRON     Take by mouth daily (with breakfast)       * FLUoxetine 40 MG capsule    PROzac    90 capsule    Take 1 capsule (40 mg) by mouth daily       * FLUoxetine 20 MG capsule    PROzac    30 capsule    Take 1 capsule (20 mg) by mouth daily       hydrOXYzine 25 MG tablet    ATARAX    60 tablet    Take 1-2 tablets (25-50 mg) by mouth every 6 hours as needed for anxiety       REMICADE IV      Inject 500 mg into the vein       * Notice:  This list has 2 medication(s) that are the same as other medications prescribed for you. Read the directions carefully, and ask your doctor or other care provider to review them with you.

## 2017-03-13 NOTE — PROGRESS NOTES
"  SUBJECTIVE:                                                    Kate Uribe is a 27 year old female who presents to clinic today for the following health issues:      Medication Followup of prozac    Taking Medication as prescribed: yes    Side Effects:  None    Medication Helping Symptoms:  Yes, but wants to get pregnant - just started trying last month.    She is currently on prozac 40mg - she is interested in weaning off during pregnancy.  She was not on medications during either of her other two pregnancies, although did have a panic attack after 1st delivery and had some general anxiety after the second.  No h/o postpartum depression.       Problem list and histories reviewed & adjusted, as indicated.  Additional history: as documented    Reviewed and updated as needed this visit by clinical staff  Tobacco  Allergies  Meds  Med Hx  Surg Hx  Fam Hx  Soc Hx      Reviewed and updated as needed this visit by Provider         ROS:  Constitutional, HEENT, cardiovascular, pulmonary, gi and gu systems are negative, except as otherwise noted.    OBJECTIVE:                                                    /60  Pulse 80  Temp 98.3  F (36.8  C) (Oral)  Ht 5' 3\" (1.6 m)  Wt 250 lb 4.8 oz (113.5 kg)  LMP 02/21/2017  BMI 44.34 kg/m2  Body mass index is 44.34 kg/(m^2).  NA    Diagnostic Test Results:  none      ASSESSMENT/PLAN:                                                        1. Anxiety  Discussed that SSRI safest class during pregnancy, but still not recommended unless they are absolutely needed. Hydroxyzine NOT recommended.  Patient still would like to wean off (see PI) and if anxiety worsens we discussed restarting this as mother's mental health priority.  - FLUoxetine (PROZAC) 20 MG capsule; Take 1 capsule (20 mg) by mouth daily  Dispense: 30 capsule; Refill: 1    Patient Instructions   Take 20mg prozac x 1 week. Then take every other day x 1 week.    Please let me know if we need to restart this " if you do get pregnancy and anxiety increases.      Bibi Gamble MD  [facility] [place]

## 2017-03-20 ENCOUNTER — MYC MEDICAL ADVICE (OUTPATIENT)
Dept: PEDIATRICS | Facility: CLINIC | Age: 27
End: 2017-03-20

## 2017-03-20 DIAGNOSIS — F41.9 ANXIETY: Primary | ICD-10-CM

## 2017-03-20 RX ORDER — LORAZEPAM 0.5 MG/1
0.5 TABLET ORAL EVERY 8 HOURS PRN
Qty: 10 TABLET | Refills: 0 | Status: SHIPPED | OUTPATIENT
Start: 2017-03-20 | End: 2017-06-22

## 2017-03-20 RX ORDER — LORAZEPAM 0.5 MG/1
0.5 TABLET ORAL EVERY 8 HOURS PRN
Qty: 10 TABLET | Refills: 0 | Status: SHIPPED | OUTPATIENT
Start: 2017-03-20 | End: 2017-03-20

## 2017-04-10 ENCOUNTER — TRANSFERRED RECORDS (OUTPATIENT)
Dept: HEALTH INFORMATION MANAGEMENT | Facility: CLINIC | Age: 27
End: 2017-04-10

## 2017-04-13 ENCOUNTER — TRANSFERRED RECORDS (OUTPATIENT)
Dept: HEALTH INFORMATION MANAGEMENT | Facility: CLINIC | Age: 27
End: 2017-04-13

## 2017-04-14 ENCOUNTER — MYC MEDICAL ADVICE (OUTPATIENT)
Dept: PEDIATRICS | Facility: CLINIC | Age: 27
End: 2017-04-14

## 2017-04-14 DIAGNOSIS — F41.9 ANXIETY: Primary | ICD-10-CM

## 2017-04-14 NOTE — TELEPHONE ENCOUNTER
Patient replied to previous conversation about Prozac and trying to get pregnant.  She decided that she needs to be on the medication and will hold off getting pregnant at the moment.  She was off Prozac for a while, now wants to restart due to anxiety.    Ok to advise e-visit to talk about restarting medication?    Lorena Guzman RN  Message handled by Nurse Triage.

## 2017-04-18 ENCOUNTER — TELEPHONE (OUTPATIENT)
Dept: SURGERY | Facility: CLINIC | Age: 27
End: 2017-04-18

## 2017-04-18 NOTE — TELEPHONE ENCOUNTER
Patient called to inform clinic that she has decided to move forward with sleeve surgery.  This is after meeting with psych a couple of times. She is realizing it would be a healthier pregnancy after surgery vs. before.  I am seeing this in a My Chart note that she also sent to [facility] on 4/14/17.She was last seen in clinic 1/24/17.    She is aware that before she can be seen by a dietitian, she has to have an okay from our medical director.  I informed patient that we still have her information from [facility] and [facility] for him to review.  She just faxed her recent iron studies from [facility] for review.    She will wait for medical director's decision.  I informed her most likely won't be this week.  She is okay with this.  Patient verbalized understanding and is agreeable to plan.  Shanti Santamaria, MS, RD, RN

## 2017-05-04 ENCOUNTER — TELEPHONE (OUTPATIENT)
Dept: SURGERY | Facility: CLINIC | Age: 27
End: 2017-05-04

## 2017-05-04 NOTE — TELEPHONE ENCOUNTER
LEft message for pt to call back to go over recommendations in psych eval.  ( Reviewed psych eval.  Alexandria Wilson recommends she do 3 months of psychotherapy to reduce anxiety and address binge eating.  Alexandria Wilson does work with eating disorders so I feel comfortable if the pt continues to see her for the 3 months.  Pt will need to get a letter from her therapist once the above has been adequately addressed. I have added this to her task list. )

## 2017-05-09 ENCOUNTER — MYC MEDICAL ADVICE (OUTPATIENT)
Dept: SURGERY | Facility: CLINIC | Age: 27
End: 2017-05-09

## 2017-05-09 NOTE — TELEPHONE ENCOUNTER
Dr Carty has talked to several GI specialists regarding your case.  You are young so he wanted to make sure he got the attention it deserved.  Good news!  Dr Cho with [place] [facility] has confirmed a pt with Crohns can have bariatric surgery.  She recommends doing a MR of CT enterography pre operatively about 1 month or so before surgery.  This is a non noninvasive imaging study that will show us what the inside of you colon is looking like at that time.  We want it to be under control before surgery.     I will put this in her submit to surgery task list and will call [facility] to order when we are closer to surgery.     Jennifer Barrientos PA-C sees Kate for Crohn's management.  I will contact her closer to surgery or have her order this study under Dr. Valentine's direction.

## 2017-05-11 ENCOUNTER — TRANSFERRED RECORDS (OUTPATIENT)
Dept: HEALTH INFORMATION MANAGEMENT | Facility: CLINIC | Age: 27
End: 2017-05-11

## 2017-05-16 NOTE — TELEPHONE ENCOUNTER
[facility]az,   The best way to contact me is through the [employer] system. I don't work on the [employer] system very often. My office phone is [phone], [phone] [phone].     Feel free to request my office notes from my encounters with Kate Jojo as crohn's was never a contraindication to gastric sleeve operation. Our goal was to have her optimized.     Thanks,   Jennifer Barrientos PA-C

## 2017-05-18 ENCOUNTER — OFFICE VISIT (OUTPATIENT)
Dept: SURGERY | Facility: CLINIC | Age: 27
End: 2017-05-18
Payer: COMMERCIAL

## 2017-05-18 DIAGNOSIS — E66.01 OBESITY, MORBID, BMI 40.0-49.9 (H): ICD-10-CM

## 2017-05-18 PROCEDURE — 97803 MED NUTRITION INDIV SUBSEQ: CPT | Performed by: DIETITIAN, REGISTERED

## 2017-05-18 NOTE — MR AVS SNAPSHOT
MRN:[id number]                      After Visit Summary   5/18/2017    Kate Uribe    MRN: [id number]           Visit Information        Provider Department      5/18/2017 9:30 AM 3Olga RD [facility]      Your next 10 appointments already scheduled     Thomas 15, 2017  2:00 PM CDT   Weight Loss Visit with Olga Lopez 2, RD   [facility] [place] ([facility])    [address] [place] [address]   [phone]            Aug 07, 2017  4:20 PM CDT   (Arrive by 4:05 PM)   IRRITABLE BOWEL DISEASE with Lane Hi MD   [facility] ([facility])    [address] [place] [address] [phone]              MyChart Information     [employer] gives you secure access to your electronic health record. If you see a primary care provider, you can also send messages to your care team and make appointments. If you have questions, please call your primary care clinic.  If you do not have a primary care provider, please call [phone] and they will assist you.        Care EveryWhere ID     This is your Care EveryWhere ID. This could be used by other organizations to access your [place] medical records  [id number]

## 2017-05-18 NOTE — PROGRESS NOTES
PRE-SURGICAL WEIGHT LOSS NUTRITION APPOINTMENT  DATE OF VISIT: 2017  Name: IGNACIO MONROY  : 1990  Gender: Female  MRN: [id number]  Age: 27  ASSESSMENT  REASON FOR VISIT:  IGNACIO MONROY is a 27 year old Female presents today for a pre-surgical weight loss follow-up appointment.  DIAGNOSIS:  Class III Obesity    ANTHROPOMETRICS:  Height: 63 inches  Initial Weight: 245.8 lbs  Weight last visit: 245.8 lbs  Current Weight: 249.9 lbs  BMI: 44.3 kg/m2    NUTRITION HISTORY:  Breakfast: [employer] protein bars on weekdays, eggs on the weekends w/toast or turkey sausage  Lunch: salads, salami+cheese roll-ups, greek yogurt, sometimes cheese stick   Supper: protein + vegetable, tries to limit the carbs, occasionally will have potatoes, cauliflower mac n cheese   Snacks: 1 snack/day (evening) - cheese stick, cheese wisps, premier protein  Drinks: water (100oz) + 8oz coffee  Consuming liquid calories: none  Eating 3 meals per day: Yes  Eating slower: Yes  Chewing foods thoroughly: Sometimes  Take 30 minutes to consume each meal: Sometimes  Fluids and meals separate by at least 30 minutes: Sometimes  Comments: 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 Pt has been researching bariatric surgery extensively and is very aware of many of the dietary changes required. Of note, pt is newly diagnosed with Crohn's disease however well controlled through current meds. Per PA-C, potentially will take longer to get to surgery depending on GI doctor's POC. Pt had many appropriate questions today.     : Pt returns after several months - was not entirely sure she wanted to pursue surgery, now confident that this is the right decision. Pt has started to get back to bariatric lifestyle habits. Has 2-3 more months of required visits with therapist. Very good control regarding evening snacking and increased physical activity.  Desired Surgical Procedure: Laparoscopic Sleeve Gastrectomy  PHYSICAL ACTIVITY:  Type:  Walking  Frequency: 3-4x/week  Duration (min): 25  DIAGNOSIS:  Previous Nutrition Diagnosis: Obesity related to excess energy intake as evidenced by BMI of 43.5kg/m2.   Unchanged, modified below.   Previous goals:  Begin taking multivitamin, calcium and vitamin D per guidelines. - improving  Take 20-30 minutes per meal. - improving  Be mindful of satiety cues/fullness - met  Avoid evening snacking. - improving  Current Nutrition Diagnosis: Obesity related to excess energy intake as evidenced by BMI of 44.3 kg/m2.  IMPLEMENTATION:  Nutrition Prescription: Recommended energy/nutrient modification  Goals:  Take calcium per guidelines (reviewed)   Take 20-30 minutes per meal   Separate fluids and meals by 30 minutes  Implementation:  - Discussed progress towards previous goals  - Reinforced importance of making behavior changes in preparation for bariatric surgery.  NUTRITION MONITORING AND EVALUATION:  Anticipated compliance: Good  Patient verbalized good understanding of bariatric diet guidelines.  Follow up: 1 month  # of visits needed: 1-2  Cleared by RD: No  TIME SPENT WITH PATIENT: 20 minutes  Christina Tate RD, LD  Clinical Dietitian

## 2017-06-05 ENCOUNTER — MYC MEDICAL ADVICE (OUTPATIENT)
Dept: PEDIATRICS | Facility: CLINIC | Age: 27
End: 2017-06-05

## 2017-06-15 ENCOUNTER — OFFICE VISIT (OUTPATIENT)
Dept: SURGERY | Facility: CLINIC | Age: 27
End: 2017-06-15
Payer: COMMERCIAL

## 2017-06-15 DIAGNOSIS — E66.01 OBESITY, MORBID, BMI 40.0-49.9 (H): ICD-10-CM

## 2017-06-15 PROCEDURE — 97803 MED NUTRITION INDIV SUBSEQ: CPT | Performed by: DIETITIAN, REGISTERED

## 2017-06-15 NOTE — PROGRESS NOTES
PRE-SURGICAL WEIGHT LOSS NUTRITION APPOINTMENT  DATE OF VISIT: 6/15/2017  Name: IGNACIO MONROY  : 1990  Gender: Female  MRN: [id number]  Age: 27  ASSESSMENT  REASON FOR VISIT:  IGNACIO MONROY is a 27 year old Female presents today for a pre-surgical weight loss follow-up appointment.  DIAGNOSIS:  Class III Obesity    ANTHROPOMETRICS:  Height: 63 inches  Initial Weight: 245.8 lbs  Weight last visit: 249.9 lbs  Current Weight: 250.9 lbs  BMI: 44.4 kg/m2    NUTRITION HISTORY:  Breakfast: (wakes at 6:30am, breakfast at 7am) Protein bar or protein shake   Lunch: meat + cheese stick +yogurt  Supper: meat + starch + vegetable, been going out more (ie [employer] - salmon and veggies + salad)  Snacks: 1x/day (3pm) - cheese stick or fruit  Drinks: water, decaf coffee (100oz/day)  Consuming liquid calories: Protein supplements/shakes  Eating 3 meals per day: Yes  Eating slower: Yes  Chewing foods thoroughly: Yes  Take 30 minutes to consume each meal: Yes  Fluids and meals separate by at least 30 minutes: Yes  Comments: 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 Pt has been researching bariatric surgery extensively and is very aware of many of the dietary changes required. Of note, pt is newly diagnosed with Crohn's disease however well controlled through current meds. Per PA-C, potentially will take longer to get to surgery depending on GI doctor's POC. Pt had many appropriate questions today. : Pt returns after several months - was not entirely sure she wanted to pursue surgery, now confident that this is the right decision. Pt has started to get back to bariatric lifestyle habits. Has 2-3 more months of required visits with therapist. Very good control regarding evening snacking and increased physical activity.    6/15: Continues with good adherence to lifestyle changes, however states that she's still struggling with increased starch portions at dinner. Continues to work with therapist about these issues.  Discussed appropriate portions at dinner and set goal around changing this behavior. Also provided pt with pre-op weight loss diet to have if desires more rigidity in her meals.   Desired Surgical Procedure: Laparoscopic Sleeve Gastrectomy  PHYSICAL ACTIVITY:  Type: Walking  Frequency: 3-4x/week  Duration (min): 25  DIAGNOSIS:  Previous Nutrition Diagnosis: Obesity related to excess energy intake as evidenced by BMI of 44.3kg/m2.   Unchanged, modified below.   Previous goals:  Take calcium per guidelines (reviewed) - improving  Take 20-30 minutes per meal - met  Separate fluids and meals by 30 minutes - met  Current Nutrition Diagnosis: Obesity related to excess energy intake as evidenced by BMI of 44.4kg/m2.   IMPLEMENTATION:  Nutrition Prescription: Recommended energy/nutrient modification  Goals:  Avoid starches at dinner, or limit to 1/2 cup or less.   Increase calcium to 500mg 3x/day   Continue to follow all pre-op dietary lifestyle habits.  Implementation:  - Discussed progress towards previous goals  - Reinforced importance of making behavior changes in preparation for bariatric surgery.  NUTRITION MONITORING AND EVALUATION:  Anticipated compliance: Good  Patient verbalized good understanding of bariatric diet guidelines.  Follow up: 1 month  # of visits needed: 1-2; depending on weight loss  Cleared by RD: No  TIME SPENT WITH PATIENT: 20 minutes  Christina Tate RD, LD  Clinical Dietitian

## 2017-06-15 NOTE — MR AVS SNAPSHOT
MRN:[id number]                      After Visit Summary   6/15/2017    Kate Uribe    MRN: [id number]           Visit Information        Provider Department      6/15/2017 9:30 AM 3, Olga Phillips Diet, RD [facility]      Your next 10 appointments already scheduled     Jun 22, 2017 10:00 AM CDT   New Visit with NEIDA Gillespie CNP   [facility] ([facility]    [address]  [facility] [address]   [phone]            Jul 20, 2017  9:30 AM CDT   Weight Loss Visit with Olga Phillips Diet 3, RD   [facility] ([facility])    [facility] [facility]  [facility] [address]   [phone]            Aug 07, 2017  4:20 PM CDT   (Arrive by 4:05 PM)   IRRITABLE BOWEL DISEASE with Lane Hi MD   [facility] ([facility])    [address] [place] [address] [phone]              MyChart Information     [employer] gives you secure access to your electronic health record. If you see a primary care provider, you can also send messages to your care team and make appointments. If you have questions, please call your primary care clinic.  If you do not have a primary care provider, please call [phone] and they will assist you.        Care EveryWhere ID     This is your Care EveryWhere ID. This could be used by other organizations to access your [facility] medical records  [id number]

## 2017-06-22 ENCOUNTER — OFFICE VISIT (OUTPATIENT)
Dept: ENDOCRINOLOGY | Facility: CLINIC | Age: 27
End: 2017-06-22
Payer: COMMERCIAL

## 2017-06-22 DIAGNOSIS — L65.9 HAIR LOSS: ICD-10-CM

## 2017-06-22 DIAGNOSIS — E66.01 OBESITY, MORBID, BMI 40.0-49.9 (H): ICD-10-CM

## 2017-06-22 DIAGNOSIS — R53.83 FATIGUE, UNSPECIFIED TYPE: ICD-10-CM

## 2017-06-22 DIAGNOSIS — D50.9 IRON DEFICIENCY ANEMIA, UNSPECIFIED IRON DEFICIENCY ANEMIA TYPE: Primary | ICD-10-CM

## 2017-06-22 DIAGNOSIS — R63.5 ABNORMAL WEIGHT GAIN: ICD-10-CM

## 2017-06-22 PROCEDURE — 99204 OFFICE O/P NEW MOD 45 MIN: CPT | Performed by: CLINICAL NURSE SPECIALIST

## 2017-06-22 NOTE — MR AVS SNAPSHOT
After Visit Summary   6/22/2017    Kate Uribe    MRN: [id number]           Patient Information     Date Of Birth          1990        Visit Information        Provider Department      6/22/2017 10:00 AM Carolina Harvey APRN CNP [facility]        Today's Diagnoses     Iron deficiency anemia, unspecified iron deficiency anemia type    -  1    Abnormal weight gain        Fatigue, unspecified type        Obesity, morbid, BMI 40.0-49.9 (H)        Hair loss           Follow-ups after your visit        Your next 10 appointments already scheduled     Jul 20, 2017  9:30 AM CDT   Weight Loss Visit with Olga Phillips Diet 3, RD   [facility] ([facility])    [address] [facility] [place] [address] [phone]            Aug 07, 2017  4:20 PM CDT   (Arrive by 4:05 PM)   IRRITABLE BOWEL DISEASE with Lane Hi MD   [facility] ([facility])    [address] [facility] [address] [facility]  [address] [facility] [address] [phone]              Who to contact     If you have questions or need follow up information about today's clinic visit or your schedule please contact [facility] directly at [phone].  Normal or non-critical lab and imaging results will be communicated to you by MyChart, letter or phone within 4 business days after the clinic has received the results. If you do not hear from us within 7 days, please contact the clinic through MyChart or phone. If you have a critical or abnormal lab result, we will notify you by phone as soon as possible.  Submit refill requests through [employer] or call your pharmacy and they will forward the refill request to us. Please allow 3 business days for your refill to be completed.          Additional Information About Your Visit        [email or web address] Information     [employer] gives you secure access to your  electronic health record. If you see a primary care provider, you can also send messages to your care team and make appointments. If you have questions, please call your primary care clinic.  If you do not have a primary care provider, please call [phone] and they will assist you.        Care EveryWhere ID     This is your Care EveryWhere ID. This could be used by other organizations to access your [place] medical records  [id number]        Your Vitals Were     Last Period Breastfeeding?                06/09/2017 (Exact Date) No           Blood Pressure from Last 3 Encounters:   06/22/17 (P) 101/58   03/13/17 116/60   03/04/17 126/68    Weight from Last 3 Encounters:   06/22/17 (P) 115.3 kg (254 lb 3.2 oz)   03/13/17 113.5 kg (250 lb 4.8 oz)   02/06/17 110.7 kg (244 lb)               Primary Care Provider Office Phone # Fax #    Bibi Gamble [phone]       [facility] [address] [place] [address]        Equal Access to Services     [facility]: Hadii aad ku hadasho Soomaali, waaxda luqadaha, qaybta kaalmada melissa, rosendo floyd . So [facility] [phone].    ATENCIÓN: Si habla español, tiene a sandhu disposición servicios gratuitos de asistencia lingüística. Lauren al [phone].    We comply with applicable federal civil rights laws and [place] laws. We do not discriminate on the basis of race, color, national origin, age, disability sex, sexual orientation or gender identity.            Thank you!     Thank you for choosing [facility] your care. Our goal is always to provide you with excellent care. Hearing back from our patients is one way we can continue to improve our services. Please take a few minutes to complete the written survey that you may receive in the mail after your visit with us. Thank you!             Your Updated Medication List - Protect others around you: Learn how to safely use,  store and throw away your medicines at www.disposemymeds.org.          This list is accurate as of: 6/22/17 11:59 PM.  Always use your most recent med list.                   Brand Name Dispense Instructions for use Diagnosis    albuterol 108 (90 BASE) MCG/ACT Inhaler    PROAIR HFA/PROVENTIL HFA/VENTOLIN HFA     Inhale 2 puffs into the lungs every 6 hours        cholecalciferol 2000 UNITS Caps           ferrous sulfate 325 (65 FE) MG tablet    IRON     Take by mouth daily (with breakfast)        FLUoxetine 40 MG capsule    PROzac    90 capsule    Take 1 capsule (40 mg) by mouth daily    Anxiety       hydrOXYzine 25 MG tablet    ATARAX    60 tablet    Take 1-2 tablets (25-50 mg) by mouth every 6 hours as needed for anxiety    Anxiety       REMICADE IV      Inject 500 mg into the vein

## 2017-06-22 NOTE — PROGRESS NOTES
Name: Kate Uribe  Seen at the request of Bibi Gamble for obesity.  HPI:  Kate Uribe is a 27 year old female who presents for the evaluation of abnormal weight gain/ morbid obesity.  Previously able to lose weight with diet and exercise efforts.  Diagnosed with Crohn's disease about one year ago. Has gained 44 lbs, 210-->254 lbs, since starting Remicade.  Her GI doctor said he did not feel the weight gain was due to the Remicade.  She doesn't understand why she has gained weight and has not been able to lose weight with the same efforts/diet that previously resulted in a 30 lb weight loss.  She wonders if there is some other hormone imbalance or underlying condition causing the abnormal weight gain.  Her mother did some recent testing and was diagnosed with Hashimoto's and possible adrenal abnormalities.      In addition to weight gain, she c/o increased hair loss and significant fatigue.  She also has a history of iron deficient anemia due to malabsorption related to Crohn's, her last iron infusion was approximately 2/2017.    Diet: Seeing dietician - [facility] in [place]  Exercise:   Considering bariatric surgery.   Works as an MA at [employer].  PMH/PSH:  Past Medical History:   Diagnosis Date     Anemia      Asthma      Crohn disease (H)      Heart murmur      Past Surgical History:   Procedure Laterality Date     BREAST SURGERY  2005    Reduction     C/SECTION, CLASSICAL  2014     Family Hx:  Family History   Problem Relation Age of Onset     DIABETES Mother      Hypertension Mother      Hyperlipidemia Mother      Obesity Mother      Gallbladder Disease Mother      MENTAL ILLNESS Mother      Thyroid Disease Mother      Colon Cancer Maternal Grandfather 70     Hypertension Maternal Grandmother      Hyperlipidemia Maternal Grandmother      Chronic Obstructive Pulmonary Disease Maternal Grandmother      Pulmonary Embolism Maternal Grandmother       Hypertension Sister      Hyperlipidemia Sister      MENTAL ILLNESS Sister      Obesity Sister      Thyroid disease: Yes, mother with hashimoto's        DM2:          Autoimmune: DM1, SLE, RA, Vitiligo     Social Hx:  Social History     Social History     Marital status:      Spouse name: N/A     Number of children: N/A     Years of education: N/A     Occupational History     Not on file.     Social History Main Topics     Smoking status: Never Smoker     Smokeless tobacco: Not on file     Alcohol use 0.0 - 1.2 oz/week     0 - 2 Standard drinks or equivalent per week     Drug use: No     Sexual activity: Yes     Partners: Male     Other Topics Concern     Not on file     Social History Narrative          MEDICATIONS:  has a current medication list which includes the following prescription(s): cholecalciferol, fluoxetine, hydroxyzine, infliximab, albuterol, and ferrous sulfate.    ROS     GENERAL: no weight loss, fevers, chills, or night sweats.   HEENT: no dysphagia, odynophagia, diplopia, neck pain or tenderness  CV: no chest pain, pressure, palpitations, skipped beats, LOC  LUNGS: no SOB, SHETH, cough, sputum production, wheezing   ABDOMEN: no diarrhea, constipation, abdominal pain  EXTREMITIES: no rashes, ulcers, edema  NEUROLOGY: no changes in vision, tingling or numbness in hands or feet.   MSK: no muscle aches or pains, weakness  SKIN: no rashes or lesions  ENDOCRINE: no heat or cold intolerance.  +fatigue.  + hair loss.    Physical Exam   VS: BP (P) 101/58 (BP Location: Left arm, Patient Position: Chair, Cuff Size: Adult Large)  Pulse (P) 78  Temp (P) 98.4  F (36.9  C) (Oral)  Resp (P) 16  Wt (P) 115.3 kg (254 lb 3.2 oz)  LMP 06/09/2017 (Exact Date)  SpO2 (P) 95%  Breastfeeding? No  BMI (P) 45.03 kg/m2  GENERAL: AXOX3, NAD, well dressed, answering questions appropriately, appears stated age.  HEENT: no exophthalmos, no proptosis, EOMI, no lig lag, no retraction  NECK:  Supple, no thyromegaly, no  "adenopathy  CV: RRR, no rubs, gallops, no murmurs  LUNGS: CTAB, no wheezes, rales, or rhonchi  ABDOMEN: soft, nontender, nondistended, no broad striae noted.  EXTREMITIES: no edema, +pulses, no rashes, no lesions  NEUROLOGY: CN grossly intact, no tremors  MSK: grossly intact  SKIN: no acanthosis, skin tags; no rashes, no lesions, no intertrigo    LABS:  !COMPREHENSIVE Latest Ref Rng & Units 2/6/2017 2/27/2017   SODIUM 133 - 144 mmol/L 139 139   POTASSIUM 3.4 - 5.3 mmol/L 4.1 3.7   CHLORIDE 94 - 109 mmol/L 107 105   BUN 7 - 30 mg/dL 17 13   Creatinine 0.52 - 1.04 mg/dL 0.64 0.54   Glucose 70 - 99 mg/dL 88 80   ANION GAP 3 - 14 mmol/L 8 8   CALCIUM 8.5 - 10.1 mg/dL 8.8 8.3 (L)   ALBUMIN 3.4 - 5.0 g/dL 3.3 (L) 3.4     !LIPID/HEPATIC Latest Ref Rng & Units 2/6/2017 2/27/2017   CHOLESTEROL <200 mg/dL 175    TRIGLYCERIDES <150 mg/dL 102    HDL CHOLESTEROL >49 mg/dL 39 (L)    LDL CHOLESTEROL, CALCULATED <100 mg/dL 116 (H)    NON HDL CHOLESTEROL <130 mg/dL 136 (H)    AST 0 - 45 U/L 19 30   ALT 0 - 50 U/L 28 50     Vital Signs 3/13/2017 6/22/2017   Weight (LB) 250 lb 4.8 oz 254 lb 3.2 oz   Height 5' 3\"    BMI (Calculated) 44.43      All pertinent notes, labs, and images personally reviewed by me.     A/P  Ms.Jessica DARYL Uribe is a 27 year old here for the evaluation of obesity/abnormal weight gain:    1. Abnormal weight gain/Morbid Obesity-  BMI 45.03.    Obesity is associated with a significant increase in mortality and risk of many disorders, including diabetes mellitus, hypertension, dyslipidemia, heart disease, stroke, sleep apnea, cancer, and many others. Conversely, weight loss is associated with a reduction in obesity-associated morbidity.    Endocrine evaluation of obesity includes Diabetes/prediabetes, Cushing's and thyroid dysfunction.  The relevant work up for the diagnosis and management of obesity and various treatment options were discussed. Body Mass Index (BMI) has been a standard means for calculating risk " for overweight and obesity. The new American Association of Clinical Endocrinology (AACE) algorithm recommends lifestyle modifications as the initial phase of treatment for all patients with the BMI equal or greater than 25 kg/m2. Lifestyle modifications includes use of medical nutrition therapy, exercise, tobacco cessation, adequate quality and quantity of sleep, limited consumption of alcohol and reduced stress through implementation of a structured, multidisciplinary program.    In patients with complications associated with obesity, graded interventions are recommended including pharmacological therapy such as phentermine, orlistat, lorcaserin and phentermine/topiramate ER, contrave ( buproprion/naltreone) and the use of very low calorie meal replacement programs.    If medical intervention is insufficient, surgical therapy may be considered, especially in patients with BMI greater than or equal to 35 kg/m2 with multiple complications. Surgical treatments include lap-band, gastric sleeve or gastric bypass surgery.  I informed the pt that:  1.Weight loss medications can be taken to assist with weight reduction when combined with appropriate healthy lifestyle changes.  2.I discussed possible s/e, risks and benefits of weight loss medications.  3.All medications are FDA approved, however, some may be used ''off label'' for their weight loss benefits and some ''short term'' medications can be used for longer periods to achieve desired clinical outcomes.  4.All patients taking weight loss medications must be seen in clinic for refill authorization.    Counseling exercise ( V65.41)  Dietary counseling( V65.3)  Calculated BMI above the upper parameter and f/u plan was documented in the medical record.  Discussed indications, risks and benefits of all medications prescribed, and answered questions to patient's satisfaction.  Discussed FDA approved weight loss medications - possibly consider in the future if no  underlying abnormalities on lab work up and if continues to be unsuccessful with diet and exercise alone.    2.  Iron deficient anemia. Most likely due to malabsorption secondary to Crohn's disease.  Last iron infustion 2/2017.  Will recheck iron status today.    3.  Fatigue.  Unknown etiology.  Potentially secondary to iron deficient anemia although there are other potential causes such as thyroid dysfunction and/or diabetes/prediabetes.  Will obtain labs below for further evaluation.    4.  Hair loss. ? Iron deficient anemia or malabsorption due to Crohn's.  Exact etiology unknown at this time.    Will obtain the following labs for further evaluation:     Labs ordered today:   Orders Placed This Encounter   Procedures     C-peptide     Comprehensive metabolic panel     Insulin level     Hemoglobin A1c     Lipid panel reflex to direct LDL     CBC with platelets     Vitamin D Deficiency     TSH     T4 FREE     T3 Free     Thyroid peroxidase antibody     Anti thyroglobulin antibody     Ferritin     Iron and iron binding capacity     Cortisol     Radiology/Consults ordered today: None    More than 50% of the time spent with Ms. Uribe on counseling / coordinating her care.  Total face to face time was greater than or equal to 45 minutes.      Follow-up:  To be determined based on lab results    Carolina Harvey NP  Endocrinology  [facility]  CC: Bibi Gamble   ____________________________________________________________

## 2017-06-23 DIAGNOSIS — R53.83 FATIGUE, UNSPECIFIED TYPE: ICD-10-CM

## 2017-06-23 DIAGNOSIS — R63.5 ABNORMAL WEIGHT GAIN: ICD-10-CM

## 2017-06-23 DIAGNOSIS — L65.9 HAIR LOSS: ICD-10-CM

## 2017-06-23 DIAGNOSIS — E66.01 OBESITY, MORBID, BMI 40.0-49.9 (H): ICD-10-CM

## 2017-06-23 DIAGNOSIS — D50.9 IRON DEFICIENCY ANEMIA, UNSPECIFIED IRON DEFICIENCY ANEMIA TYPE: ICD-10-CM

## 2017-06-23 LAB
ALBUMIN SERPL-MCNC: 3.6 G/DL (ref 3.4–5)
ALP SERPL-CCNC: 88 U/L (ref 40–150)
ALT SERPL W P-5'-P-CCNC: 24 U/L (ref 0–50)
ANION GAP SERPL CALCULATED.3IONS-SCNC: 10 MMOL/L (ref 3–14)
AST SERPL W P-5'-P-CCNC: 18 U/L (ref 0–45)
BILIRUB SERPL-MCNC: 0.3 MG/DL (ref 0.2–1.3)
BUN SERPL-MCNC: 14 MG/DL (ref 7–30)
CALCIUM SERPL-MCNC: 9.2 MG/DL (ref 8.5–10.1)
CHLORIDE SERPL-SCNC: 104 MMOL/L (ref 94–109)
CHOLEST SERPL-MCNC: 203 MG/DL
CO2 SERPL-SCNC: 25 MMOL/L (ref 20–32)
CORTIS SERPL-MCNC: 14.3 UG/DL (ref 4–22)
CREAT SERPL-MCNC: 0.59 MG/DL (ref 0.52–1.04)
DEPRECATED CALCIDIOL+CALCIFEROL SERPL-MC: 35 UG/L (ref 20–75)
ERYTHROCYTE [DISTWIDTH] IN BLOOD BY AUTOMATED COUNT: 12.8 % (ref 10–15)
FERRITIN SERPL-MCNC: 48 NG/ML (ref 12–150)
GFR SERPL CREATININE-BSD FRML MDRD: NORMAL ML/MIN/1.7M2
GLUCOSE SERPL-MCNC: 87 MG/DL (ref 70–99)
HBA1C MFR BLD: 4.9 % (ref 4.3–6)
HCT VFR BLD AUTO: 39 % (ref 35–47)
HDLC SERPL-MCNC: 47 MG/DL
HGB BLD-MCNC: 12.5 G/DL (ref 11.7–15.7)
INSULIN SERPL-ACNC: 22.6 MU/L (ref 3–25)
IRON SATN MFR SERPL: 21 % (ref 15–46)
IRON SERPL-MCNC: 73 UG/DL (ref 35–180)
LDLC SERPL CALC-MCNC: 124 MG/DL
MCH RBC QN AUTO: 28.9 PG (ref 26.5–33)
MCHC RBC AUTO-ENTMCNC: 32.1 G/DL (ref 31.5–36.5)
MCV RBC AUTO: 90 FL (ref 78–100)
NONHDLC SERPL-MCNC: 156 MG/DL
PLATELET # BLD AUTO: 240 10E9/L (ref 150–450)
POTASSIUM SERPL-SCNC: 3.9 MMOL/L (ref 3.4–5.3)
PROT SERPL-MCNC: 7.6 G/DL (ref 6.8–8.8)
RBC # BLD AUTO: 4.33 10E12/L (ref 3.8–5.2)
SODIUM SERPL-SCNC: 139 MMOL/L (ref 133–144)
T3FREE SERPL-MCNC: 2.5 PG/ML (ref 2.3–4.2)
T4 FREE SERPL-MCNC: 0.93 NG/DL (ref 0.76–1.46)
TIBC SERPL-MCNC: 345 UG/DL (ref 240–430)
TRIGL SERPL-MCNC: 159 MG/DL
TSH SERPL DL<=0.05 MIU/L-ACNC: 2.43 MU/L (ref 0.4–4)
WBC # BLD AUTO: 8.2 10E9/L (ref 4–11)

## 2017-06-23 PROCEDURE — 82533 TOTAL CORTISOL: CPT | Performed by: INTERNAL MEDICINE

## 2017-06-23 PROCEDURE — 83550 IRON BINDING TEST: CPT | Performed by: INTERNAL MEDICINE

## 2017-06-23 PROCEDURE — 82306 VITAMIN D 25 HYDROXY: CPT | Performed by: INTERNAL MEDICINE

## 2017-06-23 PROCEDURE — 80061 LIPID PANEL: CPT | Performed by: INTERNAL MEDICINE

## 2017-06-23 PROCEDURE — 86800 THYROGLOBULIN ANTIBODY: CPT | Performed by: INTERNAL MEDICINE

## 2017-06-23 PROCEDURE — 84481 FREE ASSAY (FT-3): CPT | Performed by: INTERNAL MEDICINE

## 2017-06-23 PROCEDURE — 83036 HEMOGLOBIN GLYCOSYLATED A1C: CPT | Performed by: INTERNAL MEDICINE

## 2017-06-23 PROCEDURE — 82728 ASSAY OF FERRITIN: CPT | Performed by: INTERNAL MEDICINE

## 2017-06-23 PROCEDURE — 84443 ASSAY THYROID STIM HORMONE: CPT | Performed by: INTERNAL MEDICINE

## 2017-06-23 PROCEDURE — 85027 COMPLETE CBC AUTOMATED: CPT | Performed by: INTERNAL MEDICINE

## 2017-06-23 PROCEDURE — 83525 ASSAY OF INSULIN: CPT | Performed by: INTERNAL MEDICINE

## 2017-06-23 PROCEDURE — 84681 ASSAY OF C-PEPTIDE: CPT | Performed by: INTERNAL MEDICINE

## 2017-06-23 PROCEDURE — 86376 MICROSOMAL ANTIBODY EACH: CPT | Performed by: INTERNAL MEDICINE

## 2017-06-23 PROCEDURE — 83540 ASSAY OF IRON: CPT | Performed by: INTERNAL MEDICINE

## 2017-06-23 PROCEDURE — 36415 COLL VENOUS BLD VENIPUNCTURE: CPT | Performed by: INTERNAL MEDICINE

## 2017-06-23 PROCEDURE — 80053 COMPREHEN METABOLIC PANEL: CPT | Performed by: INTERNAL MEDICINE

## 2017-06-23 PROCEDURE — 84439 ASSAY OF FREE THYROXINE: CPT | Performed by: INTERNAL MEDICINE

## 2017-06-26 LAB
C PEPTIDE SERPL-MCNC: 2.8 NG/ML (ref 0.9–6.9)
THYROGLOB AB SERPL IA-ACNC: 28 IU/ML (ref 0–40)
THYROPEROXIDASE AB SERPL-ACNC: <10 IU/ML

## 2017-06-27 DIAGNOSIS — K12.0 APHTHOUS ULCER OF MOUTH: Primary | ICD-10-CM

## 2017-06-27 NOTE — PROGRESS NOTES
Patient complains of canker sores. Confirmed by exam.   Proceed with magic mouthwash.   Miguel Abbasi PA-C

## 2017-06-28 NOTE — PROGRESS NOTES
Kate,  All in all, your lab results look good.  No indication of thyroid problems - all thyroid tests were normal.  Your glucose measures are also normal at this time - no sign of prediabetes or diabetes.  Your vitamin D is adequate.  Your iron studies are normal - no anemia.  Your cortisol was not elevated, it was normal.  Your cholesterol numbers are slightly elevated but this is not the source of your symptoms.  Let me know if you are interested in trying weight loss medications.  I'm glad all your lab results look good.  I'm sorry we didn't find any reason for your symptoms.  You could see your primary care provider if things continue or worsen.  Carolina Harvey NP  Endocrinology

## 2017-07-01 ENCOUNTER — OFFICE VISIT (OUTPATIENT)
Dept: URGENT CARE | Facility: URGENT CARE | Age: 27
End: 2017-07-01
Payer: COMMERCIAL

## 2017-07-01 VITALS
SYSTOLIC BLOOD PRESSURE: 102 MMHG | HEART RATE: 84 BPM | BODY MASS INDEX: 43.02 KG/M2 | WEIGHT: 252 LBS | OXYGEN SATURATION: 97 % | DIASTOLIC BLOOD PRESSURE: 60 MMHG | TEMPERATURE: 98.6 F | HEIGHT: 64 IN

## 2017-07-01 DIAGNOSIS — J02.9 ACUTE PHARYNGITIS, UNSPECIFIED ETIOLOGY: Primary | ICD-10-CM

## 2017-07-01 LAB
DEPRECATED S PYO AG THROAT QL EIA: NORMAL
MICRO REPORT STATUS: NORMAL
SPECIMEN SOURCE: NORMAL

## 2017-07-01 PROCEDURE — 99213 OFFICE O/P EST LOW 20 MIN: CPT | Performed by: PHYSICIAN ASSISTANT

## 2017-07-01 PROCEDURE — 87880 STREP A ASSAY W/OPTIC: CPT | Performed by: PHYSICIAN ASSISTANT

## 2017-07-01 PROCEDURE — 87081 CULTURE SCREEN ONLY: CPT | Performed by: PHYSICIAN ASSISTANT

## 2017-07-01 NOTE — NURSING NOTE
"Kate Uribe;   Chief Complaint   Patient presents with     Mouth Problem     hard to talk, hard to swallow, taste is not normal, thinking maybe she has thrush, took diflucan last night, was given magic mouthwash and this is not helping      Urgent Care     Initial /60 (BP Location: Right arm, Patient Position: Chair, Cuff Size: Adult Large)  Pulse 84  Temp 98.6  F (37  C) (Oral)  Ht 5' 4\" (1.626 m)  Wt 252 lb (114.3 kg)  LMP 06/09/2017 (Exact Date)  SpO2 97%  BMI 43.26 kg/m2 Estimated body mass index is 43.26 kg/(m^2) as calculated from the following:    Height as of this encounter: 5' 4\" (1.626 m).    Weight as of this encounter: 252 lb (114.3 kg)..  BP completed using cuff size regular.  Alicia Karimi R.N.  "

## 2017-07-01 NOTE — MR AVS SNAPSHOT
After Visit Summary   7/1/2017    Kate Uribe    MRN: [id number]           Patient Information     Date Of Birth          1990        Visit Information        Provider Department      7/1/2017 9:45 AM Mable Griffin PA-C [facility]        Today's Diagnoses     Acute pharyngitis, unspecified etiology    -  1       Follow-ups after your visit        Your next 10 appointments already scheduled     Aug 07, 2017  4:20 PM CDT   (Arrive by 4:05 PM)   INFLAMMATORY BOWEL DISEASE with Lane Hi MD   [facility] ([facility])    [address] [place] [address]  4th [facility] [address] [phone]              Who to contact     If you have questions or need follow up information about today's clinic visit or your schedule please contact [facility] directly at [phone].  Normal or non-critical lab and imaging results will be communicated to you by MyChart, letter or phone within 4 business days after the clinic has received the results. If you do not hear from us within 7 days, please contact the clinic through nanoMRhart or phone. If you have a critical or abnormal lab result, we will notify you by phone as soon as possible.  Submit refill requests through [employer] or call your pharmacy and they will forward the refill request to us. Please allow 3 business days for your refill to be completed.          Additional Information About Your Visit        nanoMRhart Information     [employer] gives you secure access to your electronic health record. If you see a primary care provider, you can also send messages to your care team and make appointments. If you have questions, please call your primary care clinic.  If you do not have a primary care provider, please call [phone] and they will assist you.        Care EveryWhere ID     This is your Care EveryWhere ID. This could be used by other organizations to  "access your [place] medical records  [id number]        Your Vitals Were     Pulse Temperature Height Last Period Pulse Oximetry BMI (Body Mass Index)    84 98.6  F (37  C) (Oral) 5' 4\" (1.626 m) 06/09/2017 (Exact Date) 97% 43.26 kg/m2       Blood Pressure from Last 3 Encounters:   07/01/17 102/60   06/22/17 (P) 101/58   03/13/17 116/60    Weight from Last 3 Encounters:   07/01/17 252 lb (114.3 kg)   06/22/17 (P) 254 lb 3.2 oz (115.3 kg)   03/13/17 250 lb 4.8 oz (113.5 kg)              We Performed the Following     Beta strep group A culture     Strep, Rapid Screen        Primary Care Provider Office Phone # Fax #    Bibi Gamble [phone]       [facility] [address] [place] [address]        Equal Access to Services     [facility]: Hadii aad ku hadasho Soomaali, waaxda luqadaha, qaybta kaalmada adeegyada, waxay idiin hayjorjen brielle floyd . So [facility] [phone].    ATENCIÓN: Si habla español, tiene a sandhu disposición servicios gratuitos de asistencia lingüística. Llame al [phone].    We comply with applicable federal civil rights laws and [place] laws. We do not discriminate on the basis of race, color, national origin, age, disability sex, sexual orientation or gender identity.            Thank you!     Thank you for choosing [facility]  for your care. Our goal is always to provide you with excellent care. Hearing back from our patients is one way we can continue to improve our services. Please take a few minutes to complete the written survey that you may receive in the mail after your visit with us. Thank you!             Your Updated Medication List - Protect others around you: Learn how to safely use, store and throw away your medicines at www.disposemymeds.org.          This list is accurate as of: 7/1/17 11:59 PM.  Always use your most recent med list.                   Brand Name Dispense Instructions for use Diagnosis    " albuterol 108 (90 BASE) MCG/ACT Inhaler    PROAIR HFA/PROVENTIL HFA/VENTOLIN HFA     Inhale 2 puffs into the lungs every 6 hours        cholecalciferol 2000 UNITS Caps           ferrous sulfate 325 (65 FE) MG tablet    IRON     Take by mouth daily (with breakfast)        FLUoxetine 40 MG capsule    PROzac    90 capsule    Take 1 capsule (40 mg) by mouth daily    Anxiety       hydrOXYzine 25 MG tablet    ATARAX    60 tablet    Take 1-2 tablets (25-50 mg) by mouth every 6 hours as needed for anxiety    Anxiety       lidocaine visc 2% 2.5mL/5mL & maalox/mylanta w/ simeth 2.5mL/5mL & diphenhydrAMINE 5mg/5mL Susp suspension    [facility] Mouthwash [facility]    120 mL    Swish and spit 10 mLs in mouth every 6 hours as needed for mouth sores    Aphthous ulcer of mouth       REMICADE IV      Inject 500 mg into the vein

## 2017-07-02 LAB
BACTERIA SPEC CULT: NORMAL
MICRO REPORT STATUS: NORMAL
SPECIMEN SOURCE: NORMAL

## 2017-07-05 ENCOUNTER — MYC MEDICAL ADVICE (OUTPATIENT)
Dept: PEDIATRICS | Facility: CLINIC | Age: 27
End: 2017-07-05

## 2017-07-05 DIAGNOSIS — F41.9 ANXIETY: Primary | ICD-10-CM

## 2017-07-05 RX ORDER — SERTRALINE HYDROCHLORIDE 100 MG/1
100 TABLET, FILM COATED ORAL DAILY
Qty: 30 TABLET | Refills: 1 | Status: SHIPPED | OUTPATIENT
Start: 2017-07-05 | End: 2017-08-30

## 2017-07-13 LAB
HBV SURFACE AG SERPL QL IA: NEGATIVE
HIV 1+2 AB+HIV1 P24 AG SERPL QL IA: NON REACTIVE
RUBELLA ANTIBODY IGG QUANTITATIVE: NORMAL IU/ML
T PALLIDUM IGG SER QL: NON REACTIVE

## 2017-07-26 ENCOUNTER — PRE VISIT (OUTPATIENT)
Dept: GASTROENTEROLOGY | Facility: CLINIC | Age: 27
End: 2017-07-26

## 2017-07-26 NOTE — TELEPHONE ENCOUNTER
1.  Date/reason for appt: 8/7/17 4:20PM Crohn's Disease per pt email req, pt transferring care from [facility]  2.  Referring provider: Self   3.  Call to patient (Yes / No - short description): No, pt has recs at [facility].   4.  Previous care at / records requested from:  [facility] - Attempt to fax cover sheet to [email or web address]. recs

## 2017-07-27 ENCOUNTER — TRANSFERRED RECORDS (OUTPATIENT)
Dept: HEALTH INFORMATION MANAGEMENT | Facility: CLINIC | Age: 27
End: 2017-07-27

## 2017-07-27 NOTE — PROGRESS NOTES
"SUBJECTIVE:  Kate Uribe is a 27 year old female with a chief complaint of sore throat and hurts to swallow and talk,.  Wonders if has thrush or strep.  Took diflucan.  Given magic mouthwash and not helping for a canker sore that had in mouth.  Onset of symptoms was 5 day(s) ago.    Course of illness: gradual onset.  Severity mild  Current and Associated symptoms: none  Treatment measures tried include Fluids, OTC meds and Rest.  Predisposing factors include None.    Past Medical History:   Diagnosis Date     Anemia      Asthma      Crohn disease (H)      Heart murmur      Current Outpatient Prescriptions   Medication Sig Dispense Refill     magic mouthwash suspension (diphenhydrAMINE, lidocaine, aluminum-magnesium & simethicone) Swish and spit 10 mLs in mouth every 6 hours as needed for mouth sores 120 mL 0     cholecalciferol 2000 UNITS CAPS        hydrOXYzine (ATARAX) 25 MG tablet Take 1-2 tablets (25-50 mg) by mouth every 6 hours as needed for anxiety 60 tablet 1     InFLIXimab (REMICADE IV) Inject 500 mg into the vein       ferrous sulfate (IRON) 325 (65 FE) MG tablet Take by mouth daily (with breakfast)       albuterol (PROAIR HFA, PROVENTIL HFA, VENTOLIN HFA) 108 (90 BASE) MCG/ACT inhaler Inhale 2 puffs into the lungs every 6 hours       sertraline (ZOLOFT) 100 MG tablet Take 1 tablet (100 mg) by mouth daily 30 tablet 1     Social History   Substance Use Topics     Smoking status: Never Smoker     Smokeless tobacco: Not on file     Alcohol use 0.0 - 1.2 oz/week     0 - 2 Standard drinks or equivalent per week       ROS:  Review of systems negative except as stated above.    OBJECTIVE:   /60 (BP Location: Right arm, Patient Position: Chair, Cuff Size: Adult Large)  Pulse 84  Temp 98.6  F (37  C) (Oral)  Ht 5' 4\" (1.626 m)  Wt 252 lb (114.3 kg)  LMP 06/09/2017 (Exact Date)  SpO2 97%  BMI 43.26 kg/m2  GENERAL APPEARANCE: healthy, alert and no distress  EYES: EOMI,  PERRL, conjunctiva " clear  HENT: TM's normal bilaterally, nose and mouth without erythema, ulcers or lesions, oral mucous membranes moist, no erythema noted and no exudate.  No sores noted and no thrush  NECK: supple, non-tender to palpation, no adenopathy noted  RESP: lungs clear to auscultation - no rales, rhonchi or wheezes  CV: regular rates and rhythm, normal S1 S2, no murmur noted  SKIN: no suspicious lesions or rashes    Results for orders placed or performed in visit on 07/01/17   Strep, Rapid Screen   Result Value Ref Range    Specimen Description Throat     Rapid Strep A Screen       NEGATIVE: No Group A streptococcal antigen detected by immunoassay, await   culture report.      Micro Report Status FINAL 07/01/2017    Beta strep group A culture   Result Value Ref Range    Specimen Description Throat     Culture Micro No beta hemolytic Streptococcus Group A isolated     Micro Report Status FINAL 07/02/2017          ASSESSMENT:   Acute pharyngitis, unspecified etiology    PLAN:   No signs of infection or thrush.   Symptomatic treat with gargles, lozenges, and OTC analgesic as needed. Follow-up with primary clinic if not improving.

## 2017-08-01 NOTE — TELEPHONE ENCOUNTER
Records received from [facility].   Included  Office notes: 2/28/17, 5/11/17, 7/27/17  Other: labs     Missing/needed records: [facility] records, colonoscopy 5/2014, MR enterography 8/8/16

## 2017-08-02 ENCOUNTER — TELEPHONE (OUTPATIENT)
Dept: GASTROENTEROLOGY | Facility: CLINIC | Age: 27
End: 2017-08-02

## 2017-08-04 NOTE — TELEPHONE ENCOUNTER
Called pt to get an TERRI form signed, spoke with pt who asked me to cancel her appt for Monday. She will follow up with an alt provider.

## 2017-08-07 ENCOUNTER — APPOINTMENT (OUTPATIENT)
Dept: ULTRASOUND IMAGING | Facility: CLINIC | Age: 27
End: 2017-08-07
Attending: EMERGENCY MEDICINE
Payer: COMMERCIAL

## 2017-08-07 ENCOUNTER — HOSPITAL ENCOUNTER (EMERGENCY)
Facility: CLINIC | Age: 27
Discharge: HOME | End: 2017-08-07
Attending: EMERGENCY MEDICINE | Admitting: EMERGENCY MEDICINE
Payer: COMMERCIAL

## 2017-08-07 VITALS
RESPIRATION RATE: 16 BRPM | OXYGEN SATURATION: 98 % | TEMPERATURE: 97.8 F | HEART RATE: 84 BPM | SYSTOLIC BLOOD PRESSURE: 107 MMHG | DIASTOLIC BLOOD PRESSURE: 50 MMHG

## 2017-08-07 DIAGNOSIS — R82.71 BACTERIURIA: ICD-10-CM

## 2017-08-07 DIAGNOSIS — K50.00 CROHN'S DISEASE OF SMALL INTESTINE WITHOUT COMPLICATION (H): ICD-10-CM

## 2017-08-07 DIAGNOSIS — Z3A.08 8 WEEKS GESTATION OF PREGNANCY: ICD-10-CM

## 2017-08-07 DIAGNOSIS — R10.11 ABDOMINAL PAIN, RIGHT UPPER QUADRANT: ICD-10-CM

## 2017-08-07 LAB
ALBUMIN SERPL-MCNC: 3.2 G/DL (ref 3.4–5)
ALBUMIN UR-MCNC: NEGATIVE MG/DL
ALP SERPL-CCNC: 104 U/L (ref 40–150)
ALT SERPL W P-5'-P-CCNC: 28 U/L (ref 0–50)
ANION GAP SERPL CALCULATED.3IONS-SCNC: 7 MMOL/L (ref 3–14)
APPEARANCE UR: ABNORMAL
AST SERPL W P-5'-P-CCNC: 18 U/L (ref 0–45)
BACTERIA #/AREA URNS HPF: ABNORMAL /HPF
BASOPHILS # BLD AUTO: 0 10E9/L (ref 0–0.2)
BASOPHILS NFR BLD AUTO: 0.3 %
BILIRUB SERPL-MCNC: 0.2 MG/DL (ref 0.2–1.3)
BILIRUB UR QL STRIP: NEGATIVE
BUN SERPL-MCNC: 7 MG/DL (ref 7–30)
CALCIUM SERPL-MCNC: 8.6 MG/DL (ref 8.5–10.1)
CHLORIDE SERPL-SCNC: 102 MMOL/L (ref 94–109)
CO2 SERPL-SCNC: 28 MMOL/L (ref 20–32)
COLOR UR AUTO: YELLOW
CREAT SERPL-MCNC: 0.47 MG/DL (ref 0.52–1.04)
DIFFERENTIAL METHOD BLD: ABNORMAL
EOSINOPHIL # BLD AUTO: 0.2 10E9/L (ref 0–0.7)
EOSINOPHIL NFR BLD AUTO: 2 %
ERYTHROCYTE [DISTWIDTH] IN BLOOD BY AUTOMATED COUNT: 12.8 % (ref 10–15)
GFR SERPL CREATININE-BSD FRML MDRD: ABNORMAL ML/MIN/1.7M2
GLUCOSE SERPL-MCNC: 74 MG/DL (ref 70–99)
GLUCOSE UR STRIP-MCNC: NEGATIVE MG/DL
HCT VFR BLD AUTO: 36.3 % (ref 35–47)
HGB BLD-MCNC: 11.6 G/DL (ref 11.7–15.7)
HGB UR QL STRIP: NEGATIVE
IMM GRANULOCYTES # BLD: 0 10E9/L (ref 0–0.4)
IMM GRANULOCYTES NFR BLD: 0.4 %
KETONES UR STRIP-MCNC: NEGATIVE MG/DL
LACTATE BLD-SCNC: 1 MMOL/L (ref 0.7–2.1)
LEUKOCYTE ESTERASE UR QL STRIP: NEGATIVE
LIPASE SERPL-CCNC: 257 U/L (ref 73–393)
LYMPHOCYTES # BLD AUTO: 2.1 10E9/L (ref 0.8–5.3)
LYMPHOCYTES NFR BLD AUTO: 18.6 %
MCH RBC QN AUTO: 28.2 PG (ref 26.5–33)
MCHC RBC AUTO-ENTMCNC: 32 G/DL (ref 31.5–36.5)
MCV RBC AUTO: 88 FL (ref 78–100)
MONOCYTES # BLD AUTO: 1 10E9/L (ref 0–1.3)
MONOCYTES NFR BLD AUTO: 8.5 %
MUCOUS THREADS #/AREA URNS LPF: PRESENT /LPF
NEUTROPHILS # BLD AUTO: 7.9 10E9/L (ref 1.6–8.3)
NEUTROPHILS NFR BLD AUTO: 70.2 %
NITRATE UR QL: NEGATIVE
NRBC # BLD AUTO: 0 10*3/UL
NRBC BLD AUTO-RTO: 0 /100
PH UR STRIP: 5 PH (ref 5–7)
PLATELET # BLD AUTO: 265 10E9/L (ref 150–450)
POTASSIUM SERPL-SCNC: 3.7 MMOL/L (ref 3.4–5.3)
PROT SERPL-MCNC: 7.7 G/DL (ref 6.8–8.8)
RBC # BLD AUTO: 4.12 10E12/L (ref 3.8–5.2)
RBC #/AREA URNS AUTO: 1 /HPF (ref 0–2)
SODIUM SERPL-SCNC: 137 MMOL/L (ref 133–144)
SP GR UR STRIP: 1.01 (ref 1–1.03)
SQUAMOUS #/AREA URNS AUTO: 1 /HPF (ref 0–1)
URN SPEC COLLECT METH UR: ABNORMAL
UROBILINOGEN UR STRIP-MCNC: 0 MG/DL (ref 0–2)
WBC # BLD AUTO: 11.2 10E9/L (ref 4–11)
WBC #/AREA URNS AUTO: 2 /HPF (ref 0–2)

## 2017-08-07 PROCEDURE — 83605 ASSAY OF LACTIC ACID: CPT | Performed by: EMERGENCY MEDICINE

## 2017-08-07 PROCEDURE — 25000132 ZZH RX MED GY IP 250 OP 250 PS 637: Performed by: EMERGENCY MEDICINE

## 2017-08-07 PROCEDURE — 81001 URINALYSIS AUTO W/SCOPE: CPT | Performed by: EMERGENCY MEDICINE

## 2017-08-07 PROCEDURE — 85025 COMPLETE CBC W/AUTO DIFF WBC: CPT | Performed by: EMERGENCY MEDICINE

## 2017-08-07 PROCEDURE — 76705 ECHO EXAM OF ABDOMEN: CPT

## 2017-08-07 PROCEDURE — 80053 COMPREHEN METABOLIC PANEL: CPT | Performed by: EMERGENCY MEDICINE

## 2017-08-07 PROCEDURE — 25000128 H RX IP 250 OP 636: Performed by: EMERGENCY MEDICINE

## 2017-08-07 PROCEDURE — 96361 HYDRATE IV INFUSION ADD-ON: CPT

## 2017-08-07 PROCEDURE — 83690 ASSAY OF LIPASE: CPT | Performed by: EMERGENCY MEDICINE

## 2017-08-07 PROCEDURE — 99284 EMERGENCY DEPT VISIT MOD MDM: CPT | Mod: 25

## 2017-08-07 PROCEDURE — 96360 HYDRATION IV INFUSION INIT: CPT

## 2017-08-07 RX ORDER — ACETAMINOPHEN 325 MG/1
975 TABLET ORAL ONCE
Status: COMPLETED | OUTPATIENT
Start: 2017-08-07 | End: 2017-08-07

## 2017-08-07 RX ORDER — PRENATAL VIT/IRON FUM/FOLIC AC 27MG-0.8MG
1 TABLET ORAL DAILY
COMMUNITY
End: 2018-03-08

## 2017-08-07 RX ORDER — SODIUM CHLORIDE 9 MG/ML
1000 INJECTION, SOLUTION INTRAVENOUS CONTINUOUS
Status: DISCONTINUED | OUTPATIENT
Start: 2017-08-07 | End: 2017-08-08 | Stop reason: HOSPADM

## 2017-08-07 RX ADMIN — SODIUM CHLORIDE 1000 ML: 9 INJECTION, SOLUTION INTRAVENOUS at 19:49

## 2017-08-07 RX ADMIN — ACETAMINOPHEN 975 MG: 325 TABLET, FILM COATED ORAL at 19:45

## 2017-08-07 ASSESSMENT — ENCOUNTER SYMPTOMS
ABDOMINAL PAIN: 1
VOMITING: 0
DIARRHEA: 1
NAUSEA: 1
FEVER: 0

## 2017-08-07 NOTE — ED AVS SNAPSHOT
[facility] Emergency Department    [address] [place] [address]    Phone:  [phone]    Fax:  [phone]                                       Kate Uribe   MRN: [id number]    Department:  [facility] Emergency Department   Date of Visit:  8/7/2017           Patient Information     Date Of Birth          1990        Your diagnoses for this visit were:     Abdominal pain, right upper quadrant     Crohn's disease of small intestine without complication (H)     8 weeks gestation of pregnancy     Bacteriuria        You were seen by Jamie Jose MD.      Follow-up Information     Follow up with [facility] In 3 days.    Why:  WORSENING DIARRHEA/CRAMPING         Follow up with [facility], [place].    Specialty:  Family Practice    Why:  ABDOMINAL PAIN, 8 WK PREGNANCY    Contact information:    [address]   [place] [address]  [phone]          Discharge Instructions       Discharge Instructions  Abdominal Pain    Abdominal pain can be caused by many things. Your evaluation today does not show the exact cause for your pain. Your doctor today has decided that it is unlikely your pain is due to a life threatening problem, or a problem requiring surgery or hospital admission. Sometimes those problems cannot be found right away, so it is very important that you follow up as directed.  Sometimes only the changes which occur over time allow the cause of your pain to be found.    Return to the Emergency Department for a recheck in 8-12 hours if your pain continues.  If your pain gets worse, changes in location, or feels different, return to the Emergency Department right away.    ADULTS:  Return to the Emergency Department right away if:      You get an oral temperature above 102oF or as directed by your doctor.    You have blood in your stools (bright red or black, tarry stools).    You keep throwing up or can t drink liquids.    You see  blood when you throw up.    You can t have a bowel movement or you can t pass gas.    Your stomach gets bloated or bigger.    Your skin or the whites of your eyes look yellow.    You faint.    You have bloody, frequent or painful urination.    You have new symptoms or anything that worries you.    CHILDREN:  Return to the Emergency Department right away if your child has any of the above-listed symptoms or the following:      Pushes your hand away or screams/cries when his/her belly is touched.    You notice your child is very fussy or weak.    Your child is very tired and is too tired to eat or drink.    Your child is dehydrated.  Signs of dehydration can be:  o Your infant has had no wet diapers in 4-5 hours.  o Your older child has not passed urine in 6-8 hours.  o Your infant or child starts to have dry mouth and lips, or no saliva or tears.    PREGNANT WOMEN:  Return to the Emergency Department right away if you have any of the above-listed symptoms or the following:      You have bleeding, leaking fluid or passing tissue from the vagina.    You have worse pain or cramping, or pain in your shoulder or back.    You have vomiting that will not stop.    You have painful or bloody urination.    You have a temperature of 100oF or more.    Your baby is not moving as much as usual.    You faint.    You get a bad headache with or without eye problems and abdominal pain.    You have a convulsion or seizure.    You have unusual discharge from your vagina and abdominal pain.    Abdominal pain is pretty common during pregnancy.  Your pain may or may not be related to your pregnancy. You should follow-up closely with your OB doctor so they can evaluate you and your baby.  Until you follow-up with your regular doctor, do the following:       Avoid sex and do not put anything in your vagina.    Drink clear fluids.    Only take medications approved by your doctor.    MORE INFORMATION:    Appendicitis:  A possible cause of  "abdominal pain in any person who still has their appendix is acute appendicitis. Appendicitis is often hard to diagnose.  Testing does not always rule out early appendicitis or other causes of abdominal pain. Close follow-up with your doctor and re-evaluations may be needed to figure out the reason for your abdominal pain.    Follow-up:  It is very important that you make an appointment with your clinic and go to the appointment.  If you do not follow-up with your primary doctor, it may result in missing an important development which could result in permanent injury or disability and/or lasting pain.  If there is any problem keeping your appointment, call your doctor or return to the Emergency Department.    Medications:  Take your medications as directed by your doctor today.  Before using over-the-counter medications, ask your doctor and make sure to take the medications as directed.  If you have any questions about medications, ask your doctor.    Diet:  Resume your normal diet as much as possible, but do not eat fried, fatty or spicy foods while you have pain.  Do not drink alcohol or have caffeine.  Do not smoke tobacco.    Probiotics: If you have been given an antibiotic, you may want to also take a probiotic pill or eat yogurt with live cultures. Probiotics have \"good bacteria\" to help your intestines stay healthy. Studies have shown that probiotics help prevent diarrhea and other intestine problems (including C. diff infection) when you take antibiotics. You can buy these without a prescription in the pharmacy section of the store.     If you were given a prescription for medicine here today, be sure to read all of the information (including the package insert) that comes with your prescription.  This will include important information about the medicine, its side effects, and any warnings that you need to know about.  The pharmacist who fills the prescription can provide more information and answer " questions you may have about the medicine.  If you have questions or concerns that the pharmacist cannot address, please call or return to the Emergency Department.         Opioid Medication Information    Pain medications are among the most commonly prescribed medicines, so we are including this information for all our patients. If you did not receive pain medication or get a prescription for pain medicine, you can ignore it.     You may have been given a prescription for an opioid (narcotic) pain medicine and/or have received a pain medicine while here in the Emergency Department. These medicines can make you drowsy or impaired. You must not drive, operate dangerous equipment, or engage in any other dangerous activities while taking these medications. If you drive while taking these medications, you could be arrested for DUI, or driving under the influence. Do not drink any alcohol while you are taking these medications.     Opioid pain medications can cause addiction. If you have a history of chemical dependency of any type, you are at a higher risk of becoming addicted to pain medications.  Only take these prescribed medications to treat your pain when all other options have been tried. Take it for as short a time and as few doses as possible. Store your pain pills in a secure place, as they are frequently stolen and provide a dangerous opportunity for children or visitors in your house to start abusing these powerful medications. We will not replace any lost or stolen medicine.  As soon as your pain is better, you should flush all your remaining medication.     Many prescription pain medications contain Tylenol  (acetaminophen), including Vicodin , Tylenol #3 , Norco , Lortab , and Percocet .  You should not take any extra pills of Tylenol  if you are using these prescription medications or you can get very sick.  Do not ever take more than 3000 mg of acetaminophen in any 24 hour period.    All opioids tend to  cause constipation. Drink plenty of water and eat foods that have a lot of fiber, such as fruits, vegetables, prune juice, apple juice and high fiber cereal.  Take a laxative if you don t move your bowels at least every other day. Miralax , Milk of Magnesia, Colace , or Senna  can be used to keep you regular.      Remember that you can always come back to the Emergency Department if you are not able to see your regular doctor in the amount of time listed above, if you get any new symptoms, or if there is anything that worries you.          Discharge Instructions for Crohn s Disease  You have been diagnosed with Crohn s disease. Your digestive tract is swollen and inflamed. All layers of your digestive tract may be affected. Although there is no cure for Crohn s disease, you can receive treatment for the symptoms. Help manage your symptoms by following your healthcare provider s advice and watching what you eat.  Home care  Recommendations for taking care of yourself at home include the following:    Work closely with your healthcare provider to determine the types of treatment that are best for you.    Take your medicines exactly as directed.    Let your healthcare provider know if you are having uncomfortable side effects.    Don t stop taking your medicines without talking with your healthcare provider first.    It may be helpful to avoid certain foods for a little while. Depending on your condition, these may include caffeine (coffee, tea, and cola), spicy foods, milk products, and raw fruits and vegetables. For certain people, these can be hard to digest and can worsen symptoms in a flare-up. Your healthcare provider may have you work with a nutritionist to come up with the best food choices for you.    Try eating several small meals a day instead of 3 large ones.    Don't smoke. Tobacco smoking makes the disease worse.     Keep appointments for regular checkups even if you are not having symptoms.    Talk to  "your healthcare provider about surgery for Crohn s disease. Surgery won t cure Crohn s disease, but it may help control the symptoms. Only you and your healthcare provider can decide if this choice is right for you.    Learn more about your condition. Contact the [facility] s [facility] [employer] toll-free at [phone] or www.ccfa.org  Managing nutrition  You may be able to eat most foods until you have a flare-up. But like anyone else, you need to make healthy eating choices. Some of the healthiest foods can make symptoms worse, though. Keeping track of your \"problem foods\" may be helpful. Ask your healthcare provider any questions you have about healthy eating.     When to call your healthcare provider  Call your healthcare provider right away if you have any of the following:    Severe pain or bloating in your belly after meals    Sores in your mouth    Sores in your anal area (around your rectum)    Fever of 100.4 F (38 C) or higher, or as directed by your healthcare provider    Poor appetite or weight loss    Bloody diarrhea    Nausea or vomiting    Skin rashes or skin that weeps (or drains)    Changes in your vision   Date Last Reviewed: 8/1/2016 2000-2017 [employer] [address] [place] [address]. All rights reserved. This information is not intended as a substitute for professional medical care. Always follow your healthcare professional's instructions.          24 Hour Appointment Hotline       To make an appointment at any [facility], call [phone] ([phone]). If you don't have a family doctor or clinic, we will help you find one. [facility] clinics are conveniently located to serve the needs of you and your family.             Review of your medicines      Our records show that you are taking the medicines listed below. If these are incorrect, please call your family doctor or clinic.        Dose / Directions Last dose taken    albuterol 108 (90 BASE) MCG/ACT " Inhaler   Commonly known as:  PROAIR HFA/PROVENTIL HFA/VENTOLIN HFA   Dose:  2 puff        Inhale 2 puffs into the lungs every 6 hours   Refills:  0        doxylamine 25 MG Tabs tablet   Commonly known as:  UNISOM   Dose:  25 mg        Take 25 mg by mouth At Bedtime   Refills:  0        ferrous sulfate 325 (65 FE) MG tablet   Commonly known as:  IRON        Take by mouth daily (with breakfast)   Refills:  0        lidocaine visc 2% 2.5mL/5mL & maalox/mylanta w/ simeth 2.5mL/5mL & diphenhydrAMINE 5mg/5mL Susp suspension   Commonly known as:  [facility] Mouthwash [facility]   Dose:  10 mL   Quantity:  120 mL        Swish and spit 10 mLs in mouth every 6 hours as needed for mouth sores   Refills:  0        prenatal multivitamin plus iron 27-0.8 MG Tabs per tablet   Dose:  1 tablet        Take 1 tablet by mouth daily   Refills:  0        REMICADE IV   Dose:  500 mg        Inject 500 mg into the vein   Refills:  0        sertraline 100 MG tablet   Commonly known as:  ZOLOFT   Dose:  100 mg   Quantity:  30 tablet        Take 1 tablet (100 mg) by mouth daily   Refills:  1        VITAMIN B6 PO        Refills:  0                Procedures and tests performed during your visit     CBC with platelets differential    Comprehensive metabolic panel    Lactic acid whole blood    Lipase    UA with Microscopic    US Abdomen Limited      Orders Needing Specimen Collection     Ordered          08/07/17 1933  Occult blood stool - STAT, Prio: STAT, Needs to be Collected     Scheduled Task Status   08/07/17 1934 Collect Occult blood stool Open   Order Class:  PCU Collect                08/07/17 1933  Enteric Bacteria and Virus Panel by AMELIE Stool - STAT, Prio: STAT, Needs to be Collected     Scheduled Task Status   08/07/17 1934 Collect Enteric Bacteria and Virus Panel by AMELIE Stool Open   Order Class:  PCU Collect                08/07/17 1933  Fecal Lactoferrin - STAT, Prio: STAT, Needs to be Collected     Scheduled Task Status   08/07/17 1934  Collect Fecal Lactoferrin Open   Order Class:  PCU Collect                08/07/17 1933  Clostridium difficile toxin B PCR - ROUTINE, Prio: Routine, Needs to be Collected     Scheduled Task Status   08/07/17 1934 Collect Clostridium difficile toxin B PCR Open   Order Class:  PCU Collect                  Pending Results     No orders found from 8/5/2017 to 8/8/2017.            Pending Culture Results     No orders found from 8/5/2017 to 8/8/2017.            Pending Results Instructions     If you had any lab results that were not finalized at the time of your Discharge, you can call the ED Lab Result RN at [phone]. You will be contacted by this team for any positive Lab results or changes in treatment. The nurses are available 7 days a week from 10A to 6:30P.  You can leave a message 24 hours per day and they will return your call.        Test Results From Your Hospital Stay        8/7/2017  7:09 PM      Component Results     Component Value Ref Range & Units Status    Color Urine Yellow  Final    Appearance Urine Slightly Cloudy  Final    Glucose Urine Negative NEG mg/dL Final    Bilirubin Urine Negative NEG Final    Ketones Urine Negative NEG mg/dL Final    Specific Gravity Urine 1.014 1.003 - 1.035 Final    Blood Urine Negative NEG Final    pH Urine 5.0 5.0 - 7.0 pH Final    Protein Albumin Urine Negative NEG mg/dL Final    Urobilinogen mg/dL 0.0 0.0 - 2.0 mg/dL Final    Nitrite Urine Negative NEG Final    Leukocyte Esterase Urine Negative NEG Final    Source Midstream Urine  Final    WBC Urine 2 0 - 2 /HPF Final    RBC Urine 1 0 - 2 /HPF Final    Bacteria Urine Moderate (A) NEG /HPF Final    Squamous Epithelial /HPF Urine 1 0 - 1 /HPF Final    Mucous Urine Present (A) NEG /LPF Final         8/7/2017  8:05 PM      Component Results     Component Value Ref Range & Units Status    WBC 11.2 (H) 4.0 - 11.0 10e9/L Final    RBC Count 4.12 3.8 - 5.2 10e12/L Final    Hemoglobin 11.6 (L) 11.7 - 15.7 g/dL Final     Hematocrit 36.3 35.0 - 47.0 % Final    MCV 88 78 - 100 fl Final    MCH 28.2 26.5 - 33.0 pg Final    MCHC 32.0 31.5 - 36.5 g/dL Final    RDW 12.8 10.0 - 15.0 % Final    Platelet Count 265 150 - 450 10e9/L Final    Diff Method Automated Method  Final    % Neutrophils 70.2 % Final    % Lymphocytes 18.6 % Final    % Monocytes 8.5 % Final    % Eosinophils 2.0 % Final    % Basophils 0.3 % Final    % Immature Granulocytes 0.4 % Final    Nucleated RBCs 0 0 /100 Final    Absolute Neutrophil 7.9 1.6 - 8.3 10e9/L Final    Absolute Lymphocytes 2.1 0.8 - 5.3 10e9/L Final    Absolute Monocytes 1.0 0.0 - 1.3 10e9/L Final    Absolute Eosinophils 0.2 0.0 - 0.7 10e9/L Final    Absolute Basophils 0.0 0.0 - 0.2 10e9/L Final    Abs Immature Granulocytes 0.0 0 - 0.4 10e9/L Final    Absolute Nucleated RBC 0.0  Final         8/7/2017  8:29 PM      Component Results     Component Value Ref Range & Units Status    Sodium 137 133 - 144 mmol/L Final    Potassium 3.7 3.4 - 5.3 mmol/L Final    Chloride 102 94 - 109 mmol/L Final    Carbon Dioxide 28 20 - 32 mmol/L Final    Anion Gap 7 3 - 14 mmol/L Final    Glucose 74 70 - 99 mg/dL Final    Urea Nitrogen 7 7 - 30 mg/dL Final    Creatinine 0.47 (L) 0.52 - 1.04 mg/dL Final    GFR Estimate >90  Non  GFR Calc   >60 mL/min/1.7m2 Final    GFR Estimate If Black >90   GFR Calc   >60 mL/min/1.7m2 Final    Calcium 8.6 8.5 - 10.1 mg/dL Final    Bilirubin Total 0.2 0.2 - 1.3 mg/dL Final    Albumin 3.2 (L) 3.4 - 5.0 g/dL Final    Protein Total 7.7 6.8 - 8.8 g/dL Final    Alkaline Phosphatase 104 40 - 150 U/L Final    ALT 28 0 - 50 U/L Final    AST 18 0 - 45 U/L Final         8/7/2017  8:29 PM      Component Results     Component Value Ref Range & Units Status    Lipase 257 73 - 393 U/L Final         8/7/2017  8:04 PM      Component Results     Component Value Ref Range & Units Status    Lactic Acid 1.0 0.7 - 2.1 mmol/L Final         8/7/2017  9:33 PM      Narrative     LIMITED  ABDOMEN ULTRASOUND  8/7/2017 8:44 PM      HISTORY: Right upper quadrant pain.    COMPARISON: None.    FINDINGS: The liver is normal in size and texture without focal mass.  There is no intra or extrahepatic biliary dilatation. The common  hepatic duct measures 0.6 cm. The gallbladder is normal in appearance  without gallstones. The pancreas body appears normal. The head and  tail are obscured by bowel gas. The right kidney measures 12.0 cm and  is normal in appearance. The proximal abdominal aorta and IVC appear  normal.         Impression     IMPRESSION: Normal right upper quadrant. No gallstone or biliary  dilatation.    ANGELICA MUNOZ MD                Clinical Quality Measure: Blood Pressure Screening     Your blood pressure was checked while you were in the emergency department today. The last reading we obtained was  BP: 107/50 . Please read the guidelines below about what these numbers mean and what you should do about them.  If your systolic blood pressure (the top number) is less than 120 and your diastolic blood pressure (the bottom number) is less than 80, then your blood pressure is normal. There is nothing more that you need to do about it.  If your systolic blood pressure (the top number) is 120-139 or your diastolic blood pressure (the bottom number) is 80-89, your blood pressure may be higher than it should be. You should have your blood pressure rechecked within a year by a primary care provider.  If your systolic blood pressure (the top number) is 140 or greater or your diastolic blood pressure (the bottom number) is 90 or greater, you may have high blood pressure. High blood pressure is treatable, but if left untreated over time it can put you at risk for heart attack, stroke, or kidney failure. You should have your blood pressure rechecked by a primary care provider within the next 4 weeks.  If your provider in the emergency department today gave you specific instructions to follow-up with your  doctor or provider even sooner than that, you should follow that instruction and not wait for up to 4 weeks for your follow-up visit.        Thank you for choosing [facility]       Thank you for choosing [facility] for your care. Our goal is always to provide you with excellent care. Hearing back from our patients is one way we can continue to improve our services. Please take a few minutes to complete the written survey that you may receive in the mail after you visit with us. Thank you!        [employer] Information     [facility] gives you secure access to your electronic health record. If you see a primary care provider, you can also send messages to your care team and make appointments. If you have questions, please call your primary care clinic.  If you do not have a primary care provider, please call [phone] and they will assist you.        Care EveryWhere ID     This is your Care EveryWhere ID. This could be used by other organizations to access your [facility] medical records  [id number]        Equal Access to Services     CHRISTINE CHEN : Holly Aguayo, izaiah solo, gordon torres, rosendo floyd . So [facility] [phone].    ATENCIÓN: Si habla español, tiene a sandhu disposición servicios gratuitos de asistencia lingüística. Lldelilah al [phone].    We comply with applicable federal civil rights laws and [place] laws. We do not discriminate on the basis of race, color, national origin, age, disability sex, sexual orientation or gender identity.            After Visit Summary       This is your record. Keep this with you and show to your community pharmacist(s) and doctor(s) at your next visit.

## 2017-08-07 NOTE — ED NOTES
Pt arrives to ED with abd pain ongoing 3 wks, pt is 8 wks pregnant and also has hx crohns, has appt with GI Thursday. Loose stools since mid July no blood noted.

## 2017-08-07 NOTE — ED AVS SNAPSHOT
[facility] Emergency Department    [address]    [facility] [address]    Phone:  [phone]    Fax:  [phone]                                       Kate Uribe   MRN: [id number]    Department:  [facility] Emergency Department   Date of Visit:  8/7/2017           After Visit Summary Signature Page     I have received my discharge instructions, and my questions have been answered. I have discussed any challenges I see with this plan with the nurse or doctor.    ..........................................................................................................................................  Patient/Patient Representative Signature      ..........................................................................................................................................  Patient Representative Print Name and Relationship to Patient    ..................................................               ................................................  Date                                            Time    ..........................................................................................................................................  Reviewed by Signature/Title    ...................................................              ..............................................  Date                                                            Time

## 2017-08-08 NOTE — ED PROVIDER NOTES
History     Chief Complaint:  Abdominal pain     HPI   Kate Uribe is a 27 year old  female with a history of Crohn's, GERD, among others, who presents with abdominal pain. The patient states that the pain has been constant and in the left upper quadrant for three weeks. The patient states that the pain consistently gets worse after eating, but remains constant throughout the day. The patient has some associated nausea, but no vomiting. She states that she had a low grade fever 3 days ago, but has not checked since. The patient states the pain is around a 6/10 on presentation. She remarks that she has also been having increased diarrhea over the past 2 weeks. She has not been on antibiotics in the past month and has had no recent travel.     Allergies:  Penicillins     Medications:    Pyridoxine  Doxylamine  Sertraline  Remicade    Past Medical History:    Anemia  Asthma  Crohn's disease  GERD  Heart murmur  Obesity     Past Surgical History:    Breast reduction--  C section--    Family History:    Diabetes--Mother  Hypertension--Mother, Sister  Hyperlipidemia--Mother, Sister  Obesity--Mother, Sister  Gallbladder disease--Mother  Mental illness--Mother, Sister  Thyroid disease--Mother    Social History:  Marital Status:   Presents to the ED alone  Tobacco Use: Never  Alcohol Use: No  PCP: Bibi Gamble     Review of Systems   Constitutional: Negative for fever.   Gastrointestinal: Positive for abdominal pain, diarrhea and nausea. Negative for vomiting.   All other systems reviewed and are negative.    Physical Exam   First Vitals:  BP: 160/67  Pulse: 84  Temp: 97.8  F (36.6  C)  Resp: 16  SpO2: 99 %      Physical Exam  General: Patient in mild distress.   HEENT:   The scalp and head appear normal    The pupils are equal, round, and reactive to light    Extraocular muscles are intact.    The nose is normal.    The oropharynx is normal.      Uvula is in the midline.  There is no  peritonsillar abscess.  Neck:  Normal range of motion.    Lungs:  Clear.      No rales, no wheezing.      There is no tachypnea.  Non-labored.  Cardiac: Regular rate.      Normal S1 and S2.      No S3 or S4.      No pathological murmur.      No pericardial rub.  Abdomen: Tender in the right upper quadrant. Obese. Soft. No distension. No tympani. No rebound.   Lymph: No anterior or posterior cervical lymphadenopathy noted.  MS:  Normal tone.      Normal movement of all extremities.    Neuro:  Normal mentation.  No focal motor or sensory changes.      Speech normal.  Psych:  Awake.     Alert.      Normal affect.      Appropriate interactions.  Skin:  No rash.      No lesions.       Emergency Department Course     Imaging:  US Abdomen Limited  Normal right upper quadrant. No gallstone or biliary  dilatation.  Radiographic findings were communicated with the patient who voiced understanding of the findings.    Laboratory:  Blood  CMP: Creatinine 0.47 (L), Albumin 3.2 (L), otherwise WNL  CBC:  WBC 11.3 (H), HGB 11.6 (L), , otherwise WNL  Lipase: 257  Lactate: 1.0    Urine  UA: Slightly cloudy yellow urine, moderate bacteria, mucous present, otherwise WNL    Interventions:  (1945) Tylenol, 975 mg, PO   (1949) Normal Saline, 1 liter, IV bolus     Emergency Department Course:  Nursing notes and vitals reviewed.  I performed an exam of the patient as documented above.  A peripheral IV was established. Blood was drawn from the patient. This was sent for laboratory testing, findings above.    Urine sample was obtained and sent for laboratory analysis, findings above.   The patient was sent for a abdominal ultrasound while in the emergency department, findings above.   Findings and plan explained to the patient. Patient discharged home with instructions regarding supportive care, medications, and reasons to return. The importance of close follow-up was reviewed.  I personally reviewed the laboratory results with the  patient and answered all related questions prior to discharge.       Impression & Plan      Medical Decision Making:  Kate Uribe is a 27 year old female who presents with 3 weeks of abdominal cramping and diarrhea. She says this feels similar to Crohn's exacerbations she has had in the past; however, she is also pregnant and does not have an appointment with GI until three days from now.   Evaluation here revealed some right upper quadrant tenderness, but ultrasound is negative for cholelithiasis or other. No cholelithiasis, no biliary dilation. Her CMP, lipase, lactate were normal. Her white blood cell count was minimally elevated (11.2), consistent with pregnancy. Hemoglobin 11.6 which is above or at her baseline.  She was not able to provide a stool specimen here and she had been here for several hours. Her symptoms have not gotten worse while here. She has had these for three weeks. She has an appointment with [facility] gastroenterology in 72 hours. I have advised her to keep that appointment. I have also recommended that she follow up with her primary care physician or OB this week to discuss her symptoms and pregnancy. She has no vaginal discharge, bleeding, cramping, or contractions to suggest labor pains and she is  with two previous deliveries.     If in the interim she develops fever, bloody stools, worsening symptoms, she should return here.     Diagnosis:    ICD-10-CM    1. Abdominal pain, right upper quadrant R10.11    2. Crohn's disease of small intestine without complication (H) K50.00    3. 8 weeks gestation of pregnancy Z3A.08        Disposition:  discharged to home    Scribe disclosure:   I, Andres Grullon, am serving as a scribe on 2017 at 7:35 PM to personally document services performed by Dr. Jose based on my observations and the provider's statements to me.     [facility] EMERGENCY DEPARTMENT       Jamie Jose MD  17 0120

## 2017-08-19 ENCOUNTER — OFFICE VISIT (OUTPATIENT)
Dept: URGENT CARE | Facility: URGENT CARE | Age: 27
End: 2017-08-19
Payer: COMMERCIAL

## 2017-08-19 VITALS
BODY MASS INDEX: 45.14 KG/M2 | WEIGHT: 263 LBS | SYSTOLIC BLOOD PRESSURE: 114 MMHG | OXYGEN SATURATION: 99 % | TEMPERATURE: 99.3 F | DIASTOLIC BLOOD PRESSURE: 74 MMHG | RESPIRATION RATE: 16 BRPM | HEART RATE: 76 BPM

## 2017-08-19 DIAGNOSIS — J02.9 ACUTE PHARYNGITIS, UNSPECIFIED ETIOLOGY: ICD-10-CM

## 2017-08-19 DIAGNOSIS — Z88.0 PENICILLIN ALLERGY: ICD-10-CM

## 2017-08-19 DIAGNOSIS — J02.0 ACUTE STREPTOCOCCAL PHARYNGITIS: Primary | ICD-10-CM

## 2017-08-19 LAB
DEPRECATED S PYO AG THROAT QL EIA: ABNORMAL
SPECIMEN SOURCE: ABNORMAL

## 2017-08-19 PROCEDURE — 87880 STREP A ASSAY W/OPTIC: CPT | Performed by: PHYSICIAN ASSISTANT

## 2017-08-19 PROCEDURE — 99213 OFFICE O/P EST LOW 20 MIN: CPT | Performed by: PHYSICIAN ASSISTANT

## 2017-08-19 RX ORDER — AZITHROMYCIN 250 MG/1
TABLET, FILM COATED ORAL
Qty: 6 TABLET | Refills: 0 | Status: SHIPPED | OUTPATIENT
Start: 2017-08-19 | End: 2017-11-27

## 2017-08-19 NOTE — NURSING NOTE
"Kate Uribe is a 27 year old female.      Chief Complaint   Patient presents with     Urgent Care     Pharyngitis     pt is here for a ST that started last night - is on Remicade and was told to be seen right away       Initial /74 (BP Location: Right arm, Cuff Size: Adult Large)  Pulse 76  Temp 99.3  F (37.4  C)  Resp 16  Wt 263 lb (119.3 kg)  LMP 06/09/2017  SpO2 99%  BMI 45.14 kg/m2 Estimated body mass index is 45.14 kg/(m^2) as calculated from the following:    Height as of 7/1/17: 5' 4\" (1.626 m).    Weight as of this encounter: 263 lb (119.3 kg).  Medication Reconciliation: complete      Questioned patient about current smoking habits.  Pt. has never smoked.      Ana Maria Flores CMA      "

## 2017-08-19 NOTE — MR AVS SNAPSHOT
After Visit Summary   8/19/2017    Kate Uribe    MRN: [id number]           Patient Information     Date Of Birth          1990        Visit Information        Provider Department      8/19/2017 9:10 AM Tr Yeager PA-C [facility]        Today's Diagnoses     Acute streptococcal pharyngitis    -  1    Acute pharyngitis, unspecified etiology        Penicillin allergy           Follow-ups after your visit        Who to contact     If you have questions or need follow up information about today's clinic visit or your schedule please contact [facility] directly at [phone].  Normal or non-critical lab and imaging results will be communicated to you by MyChart, letter or phone within 4 business days after the clinic has received the results. If you do not hear from us within 7 days, please contact the clinic through [employer] or phone. If you have a critical or abnormal lab result, we will notify you by phone as soon as possible.  Submit refill requests through [employer] or call your pharmacy and they will forward the refill request to us. Please allow 3 business days for your refill to be completed.          Additional Information About Your Visit        MyChart Information     [employer] gives you secure access to your electronic health record. If you see a primary care provider, you can also send messages to your care team and make appointments. If you have questions, please call your primary care clinic.  If you do not have a primary care provider, please call [phone] and they will assist you.        Care EveryWhere ID     This is your Care EveryWhere ID. This could be used by other organizations to access your [place] medical records  [id number]        Your Vitals Were     Pulse Temperature Respirations Last Period Pulse Oximetry BMI (Body Mass Index)    76 99.3  F (37.4  C) 16 06/09/2017 99% 45.14 kg/m2       Blood Pressure from  Last 3 Encounters:   08/19/17 114/74   08/07/17 107/50   07/01/17 102/60    Weight from Last 3 Encounters:   08/19/17 263 lb (119.3 kg)   07/01/17 252 lb (114.3 kg)   06/22/17 (P) 254 lb 3.2 oz (115.3 kg)              We Performed the Following     Rapid strep screen          Today's Medication Changes          These changes are accurate as of: 8/19/17  4:16 PM.  If you have any questions, ask your nurse or doctor.               Start taking these medicines.        Dose/Directions    azithromycin 250 MG tablet   Commonly known as:  ZITHROMAX   Used for:  Acute streptococcal pharyngitis   Started by:  Tr Yeager PA-C        Two tablets first day, then one tablet daily for four days.   Quantity:  6 tablet   Refills:  0            Where to get your medicines      These medications were sent to [facility] - [address]   [address], [place] [address]     Phone:  [phone]     azithromycin 250 MG tablet                Primary Care Provider Office Phone # Fax #    Bibi Gamble [phone]       [address] [place] [address]        Equal Access to Services     [facility] AH: Hadii pancho ku hadasho Soomaali, waaxda luqadaha, qaybta kaalmada adeegyada, rosendo lion. So [facility] [phone].    ATENCIÓN: Si habla español, tiene a sandhu disposición servicios gratuitos de asistencia lingüística. Llame al [phone].    We comply with applicable federal civil rights laws and [place] laws. We do not discriminate on the basis of race, color, national origin, age, disability sex, sexual orientation or gender identity.            Thank you!     Thank you for choosing [facility]  for your care. Our goal is always to provide you with excellent care. Hearing back from our patients is one way we can continue to improve our services. Please take a few minutes to  complete the written survey that you may receive in the mail after your visit with us. Thank you!             Your Updated Medication List - Protect others around you: Learn how to safely use, store and throw away your medicines at www.disposemymeds.org.          This list is accurate as of: 8/19/17  4:16 PM.  Always use your most recent med list.                   Brand Name Dispense Instructions for use Diagnosis    albuterol 108 (90 BASE) MCG/ACT Inhaler    PROAIR HFA/PROVENTIL HFA/VENTOLIN HFA     Inhale 2 puffs into the lungs every 6 hours        azithromycin 250 MG tablet    ZITHROMAX    6 tablet    Two tablets first day, then one tablet daily for four days.    Acute streptococcal pharyngitis       doxylamine 25 MG Tabs tablet    UNISOM     Take 25 mg by mouth At Bedtime        ferrous sulfate 325 (65 FE) MG tablet    IRON     Take by mouth daily (with breakfast)        prenatal multivitamin plus iron 27-0.8 MG Tabs per tablet      Take 1 tablet by mouth daily        REMICADE IV      Inject 500 mg into the vein        sertraline 100 MG tablet    ZOLOFT    30 tablet    Take 1 tablet (100 mg) by mouth daily    Anxiety       VITAMIN B6 PO

## 2017-08-19 NOTE — PROGRESS NOTES
SUBJECTIVE:    Kate Uribe presents to [facility] for evaluation of ST x 1-2 days duration.  Onset of fever today.  No cough, nasal congestion, rash or other URI sxs. Still able to take in PO fluids and solids.    Patient denies any N/V/D, abdominal pain or any other acute illness sxs.    ROS:     HEENT: Positive nasal congestion.   RESP: No cough, wheezing or SOB  GI: Denies any N/V/D. No abdominal pain. Normal BM's  SKIN: Denies rash      Past Medical History:   Diagnosis Date     Anemia      Asthma      Crohn disease (H)      Heart murmur      Current Outpatient Prescriptions   Medication     Prenatal Vit-Fe Fumarate-FA (PRENATAL MULTIVITAMIN PLUS IRON) 27-0.8 MG TABS per tablet     Pyridoxine HCl (VITAMIN B6 PO)     doxylamine (UNISOM) 25 MG TABS tablet     sertraline (ZOLOFT) 100 MG tablet     InFLIXimab (REMICADE IV)     ferrous sulfate (IRON) 325 (65 FE) MG tablet     albuterol (PROAIR HFA, PROVENTIL HFA, VENTOLIN HFA) 108 (90 BASE) MCG/ACT inhaler     No current facility-administered medications for this visit.      Allergies   Allergen Reactions     Penicillins Hives           OBJECTIVE:  /74 (BP Location: Right arm, Cuff Size: Adult Large)  Pulse 76  Temp 99.3  F (37.4  C)  Resp 16  Wt 263 lb (119.3 kg)  LMP 06/09/2017  SpO2 99%  BMI 45.14 kg/m2        General appearance: alert and no apparent distress  Skin color is pink and without rash.  HEENT:   Conjunctiva not injected.  Sclera clear.  Left TM is normal: no effusions, no erythema, and normal landmarks.  Right TM is normal: no effusions, no erythema, and normal landmarks.  Nasal mucosa is normal.  Oropharyngeal exam is positive for moderate, diffuse, erythema with small amount of bilateral exudate. Tonsils are 3+ size and equal in size bilaterally. No lesions, ulcers or abscesses.  Neck is supple, FROM with no adenopathy  CARDIAC:NORMAL - regular rate and rhythm without murmur.  RESP: Normal - CTA without rales, rhonchi, or  wheezing.    Component      Latest Ref Rng & Units 8/19/2017   Specimen Description       Throat   Rapid Strep A Screen       POSITIVE: Group A Streptococcal antigen detected by immunoassay. (A)         ASSESSMENT/PLAN:      (J02.0) Acute streptococcal pharyngitis  (primary encounter diagnosis)  Comment: PCN allergy   Plan: azithromycin (ZITHROMAX) 250 MG tablet  Abx as per above. Follow-up with PCP if sxs change, worsen or fail to fully resolve with above tx.       (J02.9) Acute pharyngitis, unspecified etiology  Plan: Rapid strep screen  As per above     (Z88.0) Penicillin allergy  Plan: as per above

## 2017-08-24 ENCOUNTER — MYC MEDICAL ADVICE (OUTPATIENT)
Dept: PEDIATRICS | Facility: CLINIC | Age: 27
End: 2017-08-24

## 2017-08-24 NOTE — TELEPHONE ENCOUNTER
Please advise.  Should patient be seen again?  Lorena Guzman RN  Message handled by Nurse Triage.

## 2017-08-24 NOTE — TELEPHONE ENCOUNTER
Patient should be seen.    Bibi Gamble MD  Internal Medicine/Pediatrics  [facility]

## 2017-08-29 ENCOUNTER — E-VISIT (OUTPATIENT)
Dept: PEDIATRICS | Facility: CLINIC | Age: 27
End: 2017-08-29
Payer: COMMERCIAL

## 2017-08-29 DIAGNOSIS — F41.9 ANXIETY: ICD-10-CM

## 2017-08-29 PROCEDURE — 99444 ZZC PHYSICIAN ONLINE EVALUATION & MANAGEMENT SERVICE: CPT | Performed by: PEDIATRICS

## 2017-08-30 RX ORDER — SERTRALINE HYDROCHLORIDE 100 MG/1
100 TABLET, FILM COATED ORAL DAILY
Qty: 90 TABLET | Refills: 1 | Status: SHIPPED | OUTPATIENT
Start: 2017-08-30 | End: 2018-03-19

## 2017-10-19 ENCOUNTER — PRE VISIT (OUTPATIENT)
Dept: MATERNAL FETAL MEDICINE | Facility: CLINIC | Age: 27
End: 2017-10-19

## 2017-10-26 ENCOUNTER — OFFICE VISIT (OUTPATIENT)
Dept: MATERNAL FETAL MEDICINE | Facility: CLINIC | Age: 27
End: 2017-10-26
Attending: OBSTETRICS & GYNECOLOGY
Payer: COMMERCIAL

## 2017-10-26 ENCOUNTER — HOSPITAL ENCOUNTER (OUTPATIENT)
Dept: ULTRASOUND IMAGING | Facility: CLINIC | Age: 27
Discharge: HOME | End: 2017-10-26
Attending: OBSTETRICS & GYNECOLOGY | Admitting: OBSTETRICS & GYNECOLOGY
Payer: COMMERCIAL

## 2017-10-26 DIAGNOSIS — O26.90 PREGNANCY RELATED CONDITION, ANTEPARTUM: ICD-10-CM

## 2017-10-26 DIAGNOSIS — Z82.79 FAMILY HISTORY OF CONGENITAL HEART DEFECT: ICD-10-CM

## 2017-10-26 DIAGNOSIS — O99.612: ICD-10-CM

## 2017-10-26 DIAGNOSIS — K50.90: ICD-10-CM

## 2017-10-26 PROCEDURE — 96040 ZZH GENETIC COUNSELING, EACH 30 MINUTES: CPT | Mod: ZF | Performed by: GENETIC COUNSELOR, MS

## 2017-10-26 PROCEDURE — 76811 OB US DETAILED SNGL FETUS: CPT

## 2017-10-26 NOTE — MR AVS SNAPSHOT
After Visit Summary   10/26/2017    Kate Uribe    MRN: [id number]           Patient Information     Date Of Birth          1990        Visit Information        Provider Department      10/26/2017 2:45 PM Jaylin Jameson,  e[facility] Maternal Fetal Medicine [facility]        Today's Diagnoses     Evaluate anatomy not seen on prior sonogram    -  1    Family history of congenital heart defect        Maternal Crohn's disease in second trimester (H)           Follow-ups after your visit        Your next 10 appointments already scheduled     Nov 02, 2017 10:45 AM CDT   (Arrive by 10:30 AM)   MARY JO Chiropractor with Evelin Gonzalez DC   [facility] [place] ([facility] [place]  )    [address]  [facility] [address]   [phone]            Nov 16, 2017  9:30 AM CST   MFM US COMPRE SINGLE F/U with [id number]   [facility] - [facility] ([facility])    [address] [place] [address] [address]  [facility] [address]   [phone]           Wear comfortable clothes and leave your valuables at home.            Nov 16, 2017 10:15 AM CST   MFM READ SCREEN FETAL EHCO [place] with [id number]   [facility] ([facility])    [address] [place] [address] [address]  [facility] [address]   [phone]            Nov 16, 2017 10:45 AM CST   [facility]   [facility] Maternal Fetal Medicine [facility] ([facility])    [address] [place] [address]   [phone]           Please arrive at the time given for your first appointment. This visit is used internally to schedule the physician's time during your ultrasound.              Future tests that were ordered for you today     Open Future Orders        Priority Expected Expires Ordered    MFM US Comprehensive Single F/U Routine 11/16/2017 10/26/2018 10/26/2017    MFM Read  Screen Fetal Echo Single Routine  8/26/2018 10/26/2017            Who to contact     If you have questions or need follow up information about today's clinic visit or your schedule please contact [facility]ER[facility] FETAL MEDIC[facility] - [facility] directly at [phone].  Normal or non-critical lab and imaging results will be communicated to you by MyChart, letter or phone within 4 business days after the clinic has received the results. If you do not hear from us within 7 days, please contact the clinic through [employer] or phone. If you have a critical or abnormal lab result, we will notify you by phone as soon as possible.  Submit refill requests through [employer] or call your pharmacy and they will forward the refill request to us. Please allow 3 business days for your refill to be completed.          Additional Information About Your Visit        [employer] Information     [employer] gives you secure access to your electronic health record. If you see a primary care provider, you can also send messages to your care team and make appointments. If you have questions, please call your primary care clinic.  If you do not have a primary care provider, please call [phone] and they will assist you.        Care EveryWhere ID     This is your Care EveryWhere ID. This could be used by other organizations to access your [place] medical records  [id number]        Your Vitals Were     Last Period                   06/09/2017            Blood Pressure from Last 3 Encounters:   08/19/17 114/74   08/07/17 107/50   07/01/17 102/60    Weight from Last 3 Encounters:   08/19/17 119.3 kg (263 lb)   07/01/17 114.3 kg (252 lb)   06/22/17 (P) 115.3 kg (254 lb 3.2 oz)               Primary Care Provider Office Phone # Fax #    Bibi Gamble [phone] [address] [place] [address]        Equal Access to Services     [facility] APRIL : Holly Aguayo, izaiah solo, gordon victor  rosendo torresjean claude amayaaan ah. So [facility] [phone].    ATENCIÓN: Si habla conor, tiene a sandhu disposición servicios gratuitos de asistencia lingüística. Lauren al [phone].    We comply with applicable federal civil rights laws and [place] laws. We do not discriminate on the basis of race, color, national origin, age, disability, sex, sexual orientation, or gender identity.            Thank you!     Thank you for choosing [facility]  for your care. Our goal is always to provide you with excellent care. Hearing back from our patients is one way we can continue to improve our services. Please take a few minutes to complete the written survey that you may receive in the mail after your visit with us. Thank you!             Your Updated Medication List - Protect others around you: Learn how to safely use, store and throw away your medicines at www.disposemymeds.org.          This list is accurate as of: 10/26/17  3:50 PM.  Always use your most recent med list.                   Brand Name Dispense Instructions for use Diagnosis    albuterol 108 (90 BASE) MCG/ACT Inhaler    PROAIR HFA/PROVENTIL HFA/VENTOLIN HFA     Inhale 2 puffs into the lungs every 6 hours        azithromycin 250 MG tablet    ZITHROMAX    6 tablet    Two tablets first day, then one tablet daily for four days.    Acute streptococcal pharyngitis       doxylamine 25 MG Tabs tablet    UNISOM     Take 25 mg by mouth At Bedtime        ferrous sulfate 325 (65 FE) MG tablet    IRON     Take by mouth daily (with breakfast)        prenatal multivitamin plus iron 27-0.8 MG Tabs per tablet      Take 1 tablet by mouth daily        REMICADE IV      Inject 500 mg into the vein        sertraline 100 MG tablet    ZOLOFT    90 tablet    Take 1 tablet (100 mg) by mouth daily    Anxiety       VITAMIN B6 PO

## 2017-10-26 NOTE — MR AVS SNAPSHOT
After Visit Summary   10/26/2017    Kate Uribe    MRN: [id number]           Patient Information     Date Of Birth          1990        Visit Information        Provider Department      10/26/2017 1:30 PM Karuna Jordan GC [facility] Maternal Fetal Medicine [facility]        Today's Diagnoses     Pregnancy related condition, antepartum           Follow-ups after your visit        Your next 10 appointments already scheduled     Nov 02, 2017 10:45 AM CDT   (Arrive by 10:30 AM)   MARY JO Chiropractor with Evelin Gonzalez DC   [facility] [place] ([place]  )    [address]  [address]  [place] [address]   [phone]            Nov 16, 2017  9:30 AM CST   MFM US COMPRE SINGLE F/U with [email or web address]   [facility] Maternal Fetal Medicine Ultrasound - [facility] ([facility])    [address] [place] [address]  [address]  [place] [address]   [phone]           Wear comfortable clothes and leave your valuables at home.            Nov 16, 2017 10:15 AM CST   MFM READ SCREEN FETAL EHCO [place] with [id number]   eal Maternal Fetal Medicine Ultrasound - [facility] ([facility])    [address] [place] [address]  [address]  [place] [address]   [phone]            Nov 16, 2017 10:45 AM CST   [facility]   [facility] Maternal Fetal Medicine [facility] ([facility])    [address] [place] [address]  [address]  [place] [address]   [phone]           Please arrive at the time given for your first appointment. This visit is used internally to schedule the physician's time during your ultrasound.              Future tests that were ordered for you today     Open Future Orders        Priority Expected Expires Ordered    MFM US Comprehensive Single F/U Routine 11/16/2017 10/26/2018 10/26/2017    MFM Read Screen Fetal Echo Single Routine  8/26/2018 10/26/2017            Who to contact     If you have questions or  need follow up information about today's clinic visit or your schedule please contact [facility] MATERNAL FETAL MEDICINE [facility] directly at [phone].  Normal or non-critical lab and imaging results will be communicated to you by MyChart, letter or phone within 4 business days after the clinic has received the results. If you do not hear from us within 7 days, please contact the clinic through [employer] or phone. If you have a critical or abnormal lab result, we will notify you by phone as soon as possible.  Submit refill requests through AeroDron or call your pharmacy and they will forward the refill request to us. Please allow 3 business days for your refill to be completed.          Additional Information About Your Visit        [employer] Information     [employer] gives you secure access to your electronic health record. If you see a primary care provider, you can also send messages to your care team and make appointments. If you have questions, please call your primary care clinic.  If you do not have a primary care provider, please call [phone] and they will assist you.        Care EveryWhere ID     This is your Care EveryWhere ID. This could be used by other organizations to access your [place] medical records  [id number]        Your Vitals Were     Last Period                   06/09/2017            Blood Pressure from Last 3 Encounters:   08/19/17 114/74   08/07/17 107/50   07/01/17 102/60    Weight from Last 3 Encounters:   08/19/17 119.3 kg (263 lb)   07/01/17 114.3 kg (252 lb)   06/22/17 (P) 115.3 kg (254 lb 3.2 oz)              We Performed the Following     [facility] Genetic Counseling        Primary Care Provider Office Phone # Fax #    Bibi Gamble [phone] [address] [place] [address]        Equal Access to Services     CHRISTINE CHEN : Holly Aguayo, izaiah solo, rosendo go.  So [facility] [phone].    ATENCIÓN: Si sienna perdomo, tiene a sandhu disposición servicios gratuitos de asistencia lingüística. Lauren bird [phone].    We comply with applicable federal civil rights laws and [place] laws. We do not discriminate on the basis of race, color, national origin, age, disability, sex, sexual orientation, or gender identity.            Thank you!     Thank you for choosing [facility]  for your care. Our goal is always to provide you with excellent care. Hearing back from our patients is one way we can continue to improve our services. Please take a few minutes to complete the written survey that you may receive in the mail after your visit with us. Thank you!             Your Updated Medication List - Protect others around you: Learn how to safely use, store and throw away your medicines at www.disposemymeds.org.          This list is accurate as of: 10/26/17  4:11 PM.  Always use your most recent med list.                   Brand Name Dispense Instructions for use Diagnosis    albuterol 108 (90 BASE) MCG/ACT Inhaler    PROAIR HFA/PROVENTIL HFA/VENTOLIN HFA     Inhale 2 puffs into the lungs every 6 hours        azithromycin 250 MG tablet    ZITHROMAX    6 tablet    Two tablets first day, then one tablet daily for four days.    Acute streptococcal pharyngitis       doxylamine 25 MG Tabs tablet    UNISOM     Take 25 mg by mouth At Bedtime        ferrous sulfate 325 (65 FE) MG tablet    IRON     Take by mouth daily (with breakfast)        prenatal multivitamin plus iron 27-0.8 MG Tabs per tablet      Take 1 tablet by mouth daily        REMICADE IV      Inject 500 mg into the vein        sertraline 100 MG tablet    ZOLOFT    90 tablet    Take 1 tablet (100 mg) by mouth daily    Anxiety       VITAMIN B6 PO

## 2017-10-26 NOTE — PROGRESS NOTES
"Please see \"Imaging\" tab under \"Chart Review\" for details of today's US.      Jaylin Jameson, DO  Maternal-Fetal Medicine        "

## 2017-10-26 NOTE — PROGRESS NOTES
[facility]  Genetic Counseling Consult    Patient: Kate Uribe YOB: 1990       Kate Uribe was seen at the [facility] [place] for genetic consultation as part of her appointment for comprehensive ultrasound.  The indication for genetic counseling is medication exposure and a personal history of a VSD.     Impression/Plan:   1. Kate reports a personal history of a VSD that did not require surgical correction.  Due to this history, a fetal echocardiogram is recommended.      2. Kate has Crohn's disease and is taking remicade, budesonide and sertraline.  Kate had a comprehensive (level II) ultrasound today.  Please see the ultrasound report for further details.    3. Kate had a quad screen which was negative although her risk for Down syndrome (1 in 351) was higher than her age related risk (1 in 699). The patient declines genetic amniocentesis and additional maternal serum screening today.    Pregnancy History:   /Parity:    Age at Delivery: 28 year old  KALIE: 3/16/2018, by Last Menstrual Period  Gestational Age: 19w6d    Kate has Crohn's disease and is taking remicade, budesonide and sertraline. She is followed by gastroenterology (Dr. Maldonado) in her pregnancy. We briefly discussed that is no reported patterns of birth defects with remicade and sertraline. We discussed that there are some reports of an increased risk for facial clefting with glucocorticosteroids such as budesonide.      Kate patiño pregnancy history is significant for two healthy daughters.    Medical History:   Kate patiño reported medical history is not expected to impact pregnancy management or risks to fetal development.       Family History:   A three-generation pedigree was obtained, and is scanned under the  Media  tab.   The following significant findings were reported by Kate:  Kate reports a personal history of a  VSD that did not require surgical correction. Isolated heart defects are most commonly considered a multifactorial condition, caused by a combination of genetic factors and environment.  We discussed that the general population risk for a congenital heart defect is approximately 0.5% and this history increases the risk to at least 2-3%.  We reviewed the utility of level II and fetal echocardiogram to assess for congenital heart defects.        Otherwise, the reported family history is negative for multiple miscarriages, stillbirths, birth defects, mental retardation, known genetic conditions, and consanguinity.       Carrier Screening:   The patient reports that she and the father of the pregnancy have  ancestry:      Cystic fibrosis is an autosomal recessive genetic condition that occurs with increased frequency in individuals of  ancestry and carrier screening for this condition is available.  In addition,  screening in the [facility] includes cystic fibrosis.      Expanded carrier screening for mutations in a large panel of genes associated with autosomal recessive conditions including cystic fibrosis, spinal muscular atrophy, and others, is now available.      The patient has declined the carrier screening options reviewed today.       Risk Assessment:       Kate had maternal serum screening earlier in pregnancy.    Quad screen    Maternal plasma AFP, hCG, estriol, and inhibin measurement between 15-24 weeks gestation    Provides risk assessment for fetal Down syndrome, trisomy 18, and neural tube defects    Kate had a quad screen earlier in pregnancy; we reviewed the results today, which were Screen Negative    Chemical values were as follows    AFP 0.84 MoM, hCG 1.40 MoM, estriol 0.49 MoM, and DEBI 0.92 MoM    This screen gave the following risk assessments:    Down syndrome risk= 1:351 (increased above age related risk of 1 in 699)    Trisomy 18 risk= Not increased    Open  neural tube defects risk= 1:61843       Testing Options:   We discussed the following options:   Non-invasive Prenatal Testing (NIPT)    Maternal plasma cell-free DNA testing; first trimester ultrasound with nuchal translucency and nasal bone assessment is recommended, when appropriate    Screens for fetal trisomy 21, trisomy 13, trisomy 18, and sex chromosome aneuploidy    Cannot screen for open neural tube defects; maternal serum AFP after 15 weeks is recommended     Genetic Amniocentesis    Invasive procedure typically performed in the second trimester by which amniotic fluid is obtained for the purpose of chromosome analysis and/or other prenatal genetic analysis    Diagnostic results; >99% sensitivity for fetal chromosome abnormalities    AFAFP measurement tests for open neural tube defects     Comprehensive (Level II) ultrasound: Detailed ultrasound performed between 18-22 weeks gestation to screen for major birth defects and markers for aneuploidy.      We reviewed the benefits and limitations of this testing.  Screening tests provide a risk assessment specific to the pregnancy for certain fetal chromosome abnormalities, but cannot definitively diagnose or exclude a fetal chromosome abnormality.  Follow-up genetic counseling and consideration of diagnostic testing is recommended with any abnormal screening result.     Diagnostic tests carry inherent risks- including risk of miscarriage- that require careful consideration.  These tests can detect fetal chromosome abnormalities with greater than 99% certainty.  Results can be compromised by maternal cell contamination or mosaicism, and are limited by the resolution of cytogenetic G-banding technology.  There is no screening nor diagnostic test that can detect all forms of birth defects or mental disability.     It was a pleasure to be involved with Kate patiño care. Face-to-face time of the meeting was 25 minutes.    Karuna Jordan, JD [facility]  Certified Genetic  Counselor  : [phone]

## 2017-11-01 ENCOUNTER — MYC MEDICAL ADVICE (OUTPATIENT)
Dept: PEDIATRICS | Facility: CLINIC | Age: 27
End: 2017-11-01

## 2017-11-01 DIAGNOSIS — J45.20 MILD INTERMITTENT ASTHMA IN ADULT WITHOUT COMPLICATION: Primary | ICD-10-CM

## 2017-11-01 NOTE — TELEPHONE ENCOUNTER
Albuterol Inhaler       Last Written Prescription Date: reported as historical  Last Fill Quantity: NA, # refills: NA    Last Office Visit with G, P or [facility] prescribing provider:  3/13/17   Future Office Visit:       Date of Last Asthma Action Plan Letter:   Asthma Action Plan Q1 Year    Topic Date Due     Asthma Action Plan - yearly  02/06/2018      Asthma Control Test:   ACT Total Scores 2/6/2017   ACT TOTAL SCORE (Goal Greater than or Equal to 20) 23   In the past 12 months, how many times did you visit the emergency room for your asthma without being admitted to the hospital? 0   In the past 12 months, how many times were you hospitalized overnight because of your asthma? 0       Date of Last Spirometry Test:   No results found for this or any previous visit.      Routing refill request to provider for review/approval because:  Medication is reported/historical    Key Magallon RN

## 2017-11-02 RX ORDER — ALBUTEROL SULFATE 90 UG/1
2 AEROSOL, METERED RESPIRATORY (INHALATION) EVERY 6 HOURS
Qty: 1 INHALER | Refills: 11 | Status: SHIPPED | OUTPATIENT
Start: 2017-11-02 | End: 2018-03-19

## 2017-11-16 ENCOUNTER — OFFICE VISIT (OUTPATIENT)
Dept: MATERNAL FETAL MEDICINE | Facility: CLINIC | Age: 27
End: 2017-11-16
Attending: OBSTETRICS & GYNECOLOGY
Payer: COMMERCIAL

## 2017-11-16 ENCOUNTER — HOSPITAL ENCOUNTER (OUTPATIENT)
Dept: ULTRASOUND IMAGING | Facility: CLINIC | Age: 27
Discharge: HOME | End: 2017-11-16
Attending: OBSTETRICS & GYNECOLOGY | Admitting: OBSTETRICS & GYNECOLOGY
Payer: COMMERCIAL

## 2017-11-16 ENCOUNTER — HOSPITAL ENCOUNTER (OUTPATIENT)
Dept: ULTRASOUND IMAGING | Facility: CLINIC | Age: 27
End: 2017-11-16
Attending: OBSTETRICS & GYNECOLOGY
Payer: COMMERCIAL

## 2017-11-16 DIAGNOSIS — Z82.79 FAMILY HISTORY OF CONGENITAL HEART DEFECT: ICD-10-CM

## 2017-11-16 PROCEDURE — 76816 OB US FOLLOW-UP PER FETUS: CPT

## 2017-11-16 PROCEDURE — 76825 ECHO EXAM OF FETAL HEART: CPT

## 2017-11-16 NOTE — PROGRESS NOTES
"Please see \"Imaging\" tab under \"Chart Review\" for details of today's US.    Karen Ness, DO    "

## 2017-11-16 NOTE — MR AVS SNAPSHOT
After Visit Summary   11/16/2017    Kate Uribe    MRN: [id number]           Patient Information     Date Of Birth          1990        Visit Information        Provider Department      11/16/2017 10:45 AM Karen Ness,  [employer] [facility]etal [facility]        Today's Diagnoses     Evaluate anatomy not seen on prior sonogram    -  1    Family history of congenital heart defect           Follow-ups after your visit        Your next 10 appointments already scheduled     Mar 08, 2018   Procedure with Sheldon Galaviz MD   [facility] Labor and Delivery (--)    [address] [facility] [address] [phone]              Who to contact     If you have questions or need follow up information about today's clinic visit or your schedule please contact [email or web address] MATERNAL FETAL MEDICINE [facility] directly at [phone].  Normal or non-critical lab and imaging results will be communicated to you by Orchestratehart, letter or phone within 4 business days after the clinic has received the results. If you do not hear from us within 7 days, please contact the clinic through [employer]strate[employer] or phone. If you have a critical or abnormal lab result, we will notify you by phone as soon as possible.  Submit refill requests through Kumbuya or call your pharmacy and they will forward the refill request to us. Please allow 3 business days for your refill to be completed.          Additional Information About Your Visit        MyChart Information     [employer] gives you secure access to your electronic health record. If you see a primary care provider, you can also send messages to your care team and make appointments. If you have questions, please call your primary care clinic.  If you do not have a primary care provider, please call [phone] and they will assist you.        Care EveryWhere ID     This is your Care EveryWhere ID. This could be used by other organizations to access  your [place] medical records  [id number]        Your Vitals Were     Last Period                   06/09/2017            Blood Pressure from Last 3 Encounters:   08/19/17 114/74   08/07/17 107/50   07/01/17 102/60    Weight from Last 3 Encounters:   08/19/17 119.3 kg (263 lb)   07/01/17 114.3 kg (252 lb)   06/22/17 (P) 115.3 kg (254 lb 3.2 oz)              Today, you had the following     No orders found for display       Primary Care Provider Office Phone # Fax #    Bibi Gamble [phone] [address] [place] [address]        Equal Access to Services     [facility]: Hadii pancho Aguayo, wadaksha solo, qaybta kaalmada melissa, rosendo floyd . So [facility] [phone].    ATENCIÓN: Si habla español, tiene a sandhu disposición servicios gratuitos de asistencia lingüística. Llame al [phone].    We comply with applicable federal civil rights laws and [place] laws. We do not discriminate on the basis of race, color, national origin, age, disability, sex, sexual orientation, or gender identity.            Thank you!     Thank you for choosing [facility]  for your care. Our goal is always to provide you with excellent care. Hearing back from our patients is one way we can continue to improve our services. Please take a few minutes to complete the written survey that you may receive in the mail after your visit with us. Thank you!             Your Updated Medication List - Protect others around you: Learn how to safely use, store and throw away your medicines at www.disposemymeds.org.          This list is accurate as of: 11/16/17  1:26 PM.  Always use your most recent med list.                   Brand Name Dispense Instructions for use Diagnosis    albuterol 108 (90 BASE) MCG/ACT Inhaler    PROAIR HFA/PROVENTIL HFA/VENTOLIN HFA    1 Inhaler    Inhale 2 puffs into the lungs every 6 hours    Mild  intermittent asthma in adult without complication       azithromycin 250 MG tablet    ZITHROMAX    6 tablet    Two tablets first day, then one tablet daily for four days.    Acute streptococcal pharyngitis       doxylamine 25 MG Tabs tablet    UNISOM     Take 25 mg by mouth At Bedtime        ferrous sulfate 325 (65 FE) MG tablet    IRON     Take by mouth daily (with breakfast)        prenatal multivitamin plus iron 27-0.8 MG Tabs per tablet      Take 1 tablet by mouth daily        REMICADE IV      Inject 500 mg into the vein        sertraline 100 MG tablet    ZOLOFT    90 tablet    Take 1 tablet (100 mg) by mouth daily    Anxiety       VITAMIN B6 PO

## 2017-11-22 RX ORDER — CEFAZOLIN SODIUM 1 G/3ML
1 INJECTION, POWDER, FOR SOLUTION INTRAMUSCULAR; INTRAVENOUS SEE ADMIN INSTRUCTIONS
Status: CANCELLED | OUTPATIENT
Start: 2017-11-22

## 2017-11-22 RX ORDER — SODIUM CHLORIDE, SODIUM LACTATE, POTASSIUM CHLORIDE, CALCIUM CHLORIDE 600; 310; 30; 20 MG/100ML; MG/100ML; MG/100ML; MG/100ML
INJECTION, SOLUTION INTRAVENOUS CONTINUOUS
Status: CANCELLED | OUTPATIENT
Start: 2017-11-22

## 2017-11-22 RX ORDER — CEFAZOLIN SODIUM 2 G/100ML
2 INJECTION, SOLUTION INTRAVENOUS
Status: CANCELLED | OUTPATIENT
Start: 2017-11-22

## 2017-11-22 RX ORDER — CITRIC ACID/SODIUM CITRATE 334-500MG
30 SOLUTION, ORAL ORAL
Status: CANCELLED | OUTPATIENT
Start: 2017-11-22

## 2017-11-27 ENCOUNTER — OFFICE VISIT (OUTPATIENT)
Dept: PEDIATRICS | Facility: CLINIC | Age: 27
End: 2017-11-27
Payer: COMMERCIAL

## 2017-11-27 VITALS
OXYGEN SATURATION: 99 % | HEART RATE: 106 BPM | DIASTOLIC BLOOD PRESSURE: 54 MMHG | SYSTOLIC BLOOD PRESSURE: 120 MMHG | TEMPERATURE: 98 F

## 2017-11-27 DIAGNOSIS — Z33.1 PREGNANCY, INCIDENTAL: ICD-10-CM

## 2017-11-27 DIAGNOSIS — J01.90 ACUTE SINUSITIS WITH SYMPTOMS > 10 DAYS: Primary | ICD-10-CM

## 2017-11-27 DIAGNOSIS — J45.21 MILD INTERMITTENT ASTHMA WITH EXACERBATION: ICD-10-CM

## 2017-11-27 PROCEDURE — 99214 OFFICE O/P EST MOD 30 MIN: CPT | Performed by: PHYSICIAN ASSISTANT

## 2017-11-27 RX ORDER — CEFDINIR 300 MG/1
300 CAPSULE ORAL 2 TIMES DAILY
Qty: 20 CAPSULE | Refills: 0 | Status: ON HOLD | OUTPATIENT
Start: 2017-11-27 | End: 2018-02-05

## 2017-11-27 NOTE — NURSING NOTE
"No chief complaint on file.      Initial /54 (BP Location: Right arm, Patient Position: Chair, Cuff Size: Adult Large)  Pulse 106  Temp 98  F (36.7  C) (Tympanic)  LMP 06/09/2017  SpO2 99% Estimated body mass index is 45.14 kg/(m^2) as calculated from the following:    Height as of 7/1/17: 5' 4\" (1.626 m).    Weight as of 8/19/17: 263 lb (119.3 kg).  Medication Reconciliation: complete  "

## 2017-11-27 NOTE — MR AVS SNAPSHOT
After Visit Summary   11/27/2017    Kate Uribe    MRN: [id number]           Patient Information     Date Of Birth          1990        Visit Information        Provider Department      11/27/2017 8:10 AM Miguel Abbasi PA-C [facility] [place]        Today's Diagnoses     Acute sinusitis with symptoms > 10 days    -  1    Mild intermittent asthma with exacerbation        Pregnancy, incidental           Follow-ups after your visit        Your next 10 appointments already scheduled     Mar 08, 2018   Procedure with Sheldon Galaviz MD   [facility] Labor and Delivery (--)    [address] [facility] [address] [phone]              Who to contact     If you have questions or need follow up information about today's clinic visit or your schedule please contact [facility] [place] directly at [phone].  Normal or non-critical lab and imaging results will be communicated to you by MyChart, letter or phone within 4 business days after the clinic has received the results. If you do not hear from us within 7 days, please contact the clinic through MyChart or phone. If you have a critical or abnormal lab result, we will notify you by phone as soon as possible.  Submit refill requests through [employer] or call your pharmacy and they will forward the refill request to us. Please allow 3 business days for your refill to be completed.          Additional Information About Your Visit        MyChart Information     [employer] gives you secure access to your electronic health record. If you see a primary care provider, you can also send messages to your care team and make appointments. If you have questions, please call your primary care clinic.  If you do not have a primary care provider, please call [phone] and they will assist you.        Care EveryWhere ID     This is your Care EveryWhere ID. This could be used by other organizations to access your  [place] medical records  [id number]        Your Vitals Were     Pulse Temperature Last Period Pulse Oximetry          106 98  F (36.7  C) (Tympanic) 06/09/2017 99%         Blood Pressure from Last 3 Encounters:   11/27/17 120/54   08/19/17 114/74   08/07/17 107/50    Weight from Last 3 Encounters:   08/19/17 263 lb (119.3 kg)   07/01/17 252 lb (114.3 kg)   06/22/17 (P) 254 lb 3.2 oz (115.3 kg)              Today, you had the following     No orders found for display         Today's Medication Changes          These changes are accurate as of: 11/27/17  9:59 AM.  If you have any questions, ask your nurse or doctor.               Start taking these medicines.        Dose/Directions    cefdinir 300 MG capsule   Commonly known as:  OMNICEF   Used for:  Acute sinusitis with symptoms > 10 days        Dose:  300 mg   Take 1 capsule (300 mg) by mouth 2 times daily   Quantity:  20 capsule   Refills:  0            Where to get your medicines      These medications were sent to [facility] - [address]   [address], [place] [address]     Phone:  [phone]     cefdinir 300 MG capsule                Primary Care Provider Office Phone # Fax #    Bibi Gamble [phone]       [facility] [place] [address]        Equal Access to Services     [facility] AH: Hadii pancho agudelo hadasho Sooswaldo, waaxda luqadaha, qaybta kaalmada melissa, rosendo floyd . So [facility] [phone].    ATENCIÓN: Si habla español, tiene a sandhu disposición servicios gratuitos de asistencia lingüística. Llame al [phone].    We comply with applicable federal civil rights laws and [place] laws. We do not discriminate on the basis of race, color, national origin, age, disability, sex, sexual orientation, or gender identity.            Thank you!     Thank you for choosing [facility]  for your care. Our goal is always  to provide you with excellent care. Hearing back from our patients is one way we can continue to improve our services. Please take a few minutes to complete the written survey that you may receive in the mail after your visit with us. Thank you!             Your Updated Medication List - Protect others around you: Learn how to safely use, store and throw away your medicines at www.disposemymeds.org.          This list is accurate as of: 11/27/17  9:59 AM.  Always use your most recent med list.                   Brand Name Dispense Instructions for use Diagnosis    albuterol 108 (90 BASE) MCG/ACT Inhaler    PROAIR HFA/PROVENTIL HFA/VENTOLIN HFA    1 Inhaler    Inhale 2 puffs into the lungs every 6 hours    Mild intermittent asthma in adult without complication       cefdinir 300 MG capsule    OMNICEF    20 capsule    Take 1 capsule (300 mg) by mouth 2 times daily    Acute sinusitis with symptoms > 10 days       prenatal multivitamin plus iron 27-0.8 MG Tabs per tablet      Take 1 tablet by mouth daily        REMICADE IV      Inject 500 mg into the vein        sertraline 100 MG tablet    ZOLOFT    90 tablet    Take 1 tablet (100 mg) by mouth daily    Anxiety

## 2017-11-27 NOTE — PROGRESS NOTES
SUBJECTIVE:   Kate Uribe is a 27 year old female, 24 weeks pregnant, who presents to clinic today for the following health issues:    History of asthma. Inhaler: no use  Acute Illness   Acute illness concerns: URI; Cough  Onset: 1 month ago    Fever: no    Chills/Sweats: no    Headache (location?): YES    Sinus Pressure:YES    Conjunctivitis:  no    Ear Pain: YES: bilateral    Rhinorrhea: YES    Congestion: YES    Sore Throat: YES     Cough: YES-productive of green sputum, worsening over time    sob    Wheeze: no     Decreased Appetite: no     Nausea: no     Vomiting: no     Diarrhea:  YES    Dysuria/Freq.: no     Fatigue/Achiness: YES    Sick/Strep Exposure: no      Therapies Tried and outcome: Zpack, tylenol    ROS:  ROS otherwise negative    OBJECTIVE:                                                    /54 (BP Location: Right arm, Patient Position: Chair, Cuff Size: Adult Large)  Pulse 106  Temp 98  F (36.7  C) (Tympanic)  LMP 06/09/2017  SpO2 99%  There is no height or weight on file to calculate BMI.   GENERAL: alert, no distress  HENT: ear canals- normal; TMs- normal; Nose- normal; Mouth- no ulcers, no lesions; max sinus tenderness  NECK:tonsillar LAD  RESP: diminished breath sounds; expiratory wheezing and rhonchi  CV: regular rates and rhythm, normal S1 S2, no S3 or S4 and no murmur, no click or rub    Diagnostic test results:  No results found for this or any previous visit (from the past 24 hour(s)).       ASSESSMENT/PLAN:                                                    (J01.90) Acute sinusitis with symptoms > 10 days  (primary encounter diagnosis)  Comment: begin antibiotics as directed.   Plan: cefdinir (OMNICEF) 300 MG capsule          (J45.21) Mild intermittent asthma with exacerbation  Comment: begin albuterol four times daily-scheduled. Follow up in 3-5 days if no improvement.   Plan:     (Z33.1) Pregnancy, incidental  Comment:   Plan:     See Patient Instructions    Miguel  Nataliya Abbasi PA-C  [facility]

## 2017-12-04 ENCOUNTER — TELEPHONE (OUTPATIENT)
Dept: PEDIATRICS | Facility: CLINIC | Age: 27
End: 2017-12-04

## 2017-12-04 DIAGNOSIS — J45.41 MODERATE PERSISTENT ASTHMA WITH EXACERBATION: Primary | ICD-10-CM

## 2017-12-04 RX ORDER — FLUTICASONE PROPIONATE 110 UG/1
2 AEROSOL, METERED RESPIRATORY (INHALATION) 2 TIMES DAILY
Qty: 1 INHALER | Refills: 1 | Status: SHIPPED | OUTPATIENT
Start: 2017-12-04 | End: 2019-07-12

## 2017-12-04 NOTE — TELEPHONE ENCOUNTER
Patient continues to have persistent asthma symptoms secondary to sinusitis.   Continue with cefdinir as directed. Begin flovent twice daily x one month and albuterol up to four times daily PRN.   Miguel Abbasi PA-C

## 2017-12-12 ENCOUNTER — RESULTS ONLY (OUTPATIENT)
Dept: OTHER | Facility: CLINIC | Age: 27
End: 2017-12-12

## 2017-12-12 DIAGNOSIS — K50.00 CROHN'S DISEASE OF SMALL INTESTINE WITHOUT COMPLICATION (H): ICD-10-CM

## 2017-12-12 DIAGNOSIS — K21.9 GASTROESOPHAGEAL REFLUX DISEASE WITHOUT ESOPHAGITIS: Primary | ICD-10-CM

## 2017-12-12 LAB
BASOPHILS # BLD AUTO: 0 10E9/L (ref 0–0.2)
BASOPHILS NFR BLD AUTO: 0.1 %
CRP SERPL-MCNC: 30 MG/L (ref 0–8)
DIFFERENTIAL METHOD BLD: ABNORMAL
EOSINOPHIL # BLD AUTO: 0.3 10E9/L (ref 0–0.7)
EOSINOPHIL NFR BLD AUTO: 1.8 %
ERYTHROCYTE [DISTWIDTH] IN BLOOD BY AUTOMATED COUNT: 13.3 % (ref 10–15)
FOLATE SERPL-MCNC: 17.6 NG/ML
HCT VFR BLD AUTO: 33.2 % (ref 35–47)
HGB BLD-MCNC: 10.6 G/DL (ref 11.7–15.7)
LYMPHOCYTES # BLD AUTO: 2 10E9/L (ref 0.8–5.3)
LYMPHOCYTES NFR BLD AUTO: 14.4 %
MCH RBC QN AUTO: 28 PG (ref 26.5–33)
MCHC RBC AUTO-ENTMCNC: 31.9 G/DL (ref 31.5–36.5)
MCV RBC AUTO: 88 FL (ref 78–100)
MONOCYTES # BLD AUTO: 0.8 10E9/L (ref 0–1.3)
MONOCYTES NFR BLD AUTO: 6.2 %
NEUTROPHILS # BLD AUTO: 10.5 10E9/L (ref 1.6–8.3)
NEUTROPHILS NFR BLD AUTO: 77.5 %
PLATELET # BLD AUTO: 253 10E9/L (ref 150–450)
RBC # BLD AUTO: 3.79 10E12/L (ref 3.8–5.2)
VIT B12 SERPL-MCNC: 243 PG/ML (ref 193–986)
WBC # BLD AUTO: 13.5 10E9/L (ref 4–11)

## 2017-12-12 PROCEDURE — 82746 ASSAY OF FOLIC ACID SERUM: CPT | Performed by: INTERNAL MEDICINE

## 2017-12-12 PROCEDURE — 82306 VITAMIN D 25 HYDROXY: CPT | Performed by: INTERNAL MEDICINE

## 2017-12-12 PROCEDURE — 83516 IMMUNOASSAY NONANTIBODY: CPT | Mod: 91 | Performed by: INTERNAL MEDICINE

## 2017-12-12 PROCEDURE — 86256 FLUORESCENT ANTIBODY TITER: CPT | Mod: 90 | Performed by: INTERNAL MEDICINE

## 2017-12-12 PROCEDURE — 80048 BASIC METABOLIC PNL TOTAL CA: CPT | Performed by: INTERNAL MEDICINE

## 2017-12-12 PROCEDURE — 82784 ASSAY IGA/IGD/IGG/IGM EACH: CPT | Performed by: INTERNAL MEDICINE

## 2017-12-12 PROCEDURE — 81376 HLA II TYPING 1 LOCUS LR: CPT | Mod: 59 | Performed by: INTERNAL MEDICINE

## 2017-12-12 PROCEDURE — 80076 HEPATIC FUNCTION PANEL: CPT | Performed by: INTERNAL MEDICINE

## 2017-12-12 PROCEDURE — 82607 VITAMIN B-12: CPT | Performed by: INTERNAL MEDICINE

## 2017-12-12 PROCEDURE — 85025 COMPLETE CBC W/AUTO DIFF WBC: CPT | Performed by: INTERNAL MEDICINE

## 2017-12-12 PROCEDURE — 83516 IMMUNOASSAY NONANTIBODY: CPT | Mod: 90 | Performed by: INTERNAL MEDICINE

## 2017-12-12 PROCEDURE — 36415 COLL VENOUS BLD VENIPUNCTURE: CPT | Performed by: INTERNAL MEDICINE

## 2017-12-12 PROCEDURE — 86140 C-REACTIVE PROTEIN: CPT | Performed by: INTERNAL MEDICINE

## 2017-12-12 PROCEDURE — 81375 HLA II TYPING AG EQUIV LR: CPT | Performed by: INTERNAL MEDICINE

## 2017-12-12 PROCEDURE — 99000 SPECIMEN HANDLING OFFICE-LAB: CPT | Performed by: INTERNAL MEDICINE

## 2017-12-13 LAB
ALBUMIN SERPL-MCNC: 2.5 G/DL (ref 3.4–5)
ALP SERPL-CCNC: 121 U/L (ref 40–150)
ALT SERPL W P-5'-P-CCNC: 17 U/L (ref 0–50)
ANION GAP SERPL CALCULATED.3IONS-SCNC: 12 MMOL/L (ref 3–14)
AST SERPL W P-5'-P-CCNC: 13 U/L (ref 0–45)
BILIRUB DIRECT SERPL-MCNC: <0.1 MG/DL (ref 0–0.2)
BILIRUB SERPL-MCNC: <0.1 MG/DL (ref 0.2–1.3)
BUN SERPL-MCNC: 5 MG/DL (ref 7–30)
CALCIUM SERPL-MCNC: 8.8 MG/DL (ref 8.5–10.1)
CHLORIDE SERPL-SCNC: 105 MMOL/L (ref 94–109)
CO2 SERPL-SCNC: 20 MMOL/L (ref 20–32)
CREAT SERPL-MCNC: 0.33 MG/DL (ref 0.52–1.04)
DEPRECATED CALCIDIOL+CALCIFEROL SERPL-MC: 17 UG/L (ref 20–75)
GFR SERPL CREATININE-BSD FRML MDRD: >90 ML/MIN/1.7M2
GLIADIN IGA SER-ACNC: 1 U/ML
GLIADIN IGG SER-ACNC: <1 U/ML
GLIADIN PEPTIDE+TTG IGA+IGG SER QL IA: 8 UNITS (ref 0–19)
GLUCOSE SERPL-MCNC: 124 MG/DL (ref 70–99)
HLA DQB1 FOR CELIAC DISEASE: NORMAL
IGA SERPL-MCNC: 410 MG/DL (ref 70–380)
POTASSIUM SERPL-SCNC: 3.6 MMOL/L (ref 3.4–5.3)
PROT SERPL-MCNC: 7.2 G/DL (ref 6.8–8.8)
SODIUM SERPL-SCNC: 137 MMOL/L (ref 133–144)
TTG IGA SER-ACNC: 1 U/ML
TTG IGG SER-ACNC: <1 U/ML

## 2017-12-14 LAB — ENDOMYSIUM IGA TITR SER IF: NORMAL {TITER}

## 2017-12-15 LAB
DQA1*: NORMAL
DQA1*LOCUS NMDP: NORMAL
DQA1*LOCUS: NORMAL
DQB1* LOCUS NMDP: NORMAL
DQB1* LOCUS: NORMAL
DQB1* NMDP: NORMAL
DQB1*: NORMAL
DRSSO TEST METHOD: NORMAL

## 2018-01-17 DIAGNOSIS — K50.00 CROHN'S DISEASE OF SMALL INTESTINE WITHOUT COMPLICATION (H): Primary | ICD-10-CM

## 2018-01-17 LAB
ALP SERPL-CCNC: 127 U/L (ref 40–150)
ALT SERPL W P-5'-P-CCNC: 12 U/L (ref 0–50)
AST SERPL W P-5'-P-CCNC: 13 U/L (ref 0–45)
BILIRUB DIRECT SERPL-MCNC: <0.1 MG/DL (ref 0–0.2)
BILIRUB SERPL-MCNC: 0.2 MG/DL (ref 0.2–1.3)
ERYTHROCYTE [DISTWIDTH] IN BLOOD BY AUTOMATED COUNT: 14.1 % (ref 10–15)
HCT VFR BLD AUTO: 32.1 % (ref 35–47)
HGB BLD-MCNC: 10.4 G/DL (ref 11.7–15.7)
MCH RBC QN AUTO: 26.9 PG (ref 26.5–33)
MCHC RBC AUTO-ENTMCNC: 32.4 G/DL (ref 31.5–36.5)
MCV RBC AUTO: 83 FL (ref 78–100)
PLATELET # BLD AUTO: 265 10E9/L (ref 150–450)
RBC # BLD AUTO: 3.87 10E12/L (ref 3.8–5.2)
WBC # BLD AUTO: 12.1 10E9/L (ref 4–11)

## 2018-01-17 PROCEDURE — 84450 TRANSFERASE (AST) (SGOT): CPT

## 2018-01-17 PROCEDURE — 84075 ASSAY ALKALINE PHOSPHATASE: CPT

## 2018-01-17 PROCEDURE — 36415 COLL VENOUS BLD VENIPUNCTURE: CPT

## 2018-01-17 PROCEDURE — 85027 COMPLETE CBC AUTOMATED: CPT

## 2018-01-17 PROCEDURE — 82248 BILIRUBIN DIRECT: CPT

## 2018-01-17 PROCEDURE — 82247 BILIRUBIN TOTAL: CPT

## 2018-01-17 PROCEDURE — 84460 ALANINE AMINO (ALT) (SGPT): CPT

## 2018-02-05 ENCOUNTER — HOSPITAL ENCOUNTER (OUTPATIENT)
Facility: CLINIC | Age: 28
LOS: 1 days | Discharge: HOME | End: 2018-02-05
Attending: OBSTETRICS & GYNECOLOGY | Admitting: OBSTETRICS & GYNECOLOGY
Payer: COMMERCIAL

## 2018-02-05 VITALS
SYSTOLIC BLOOD PRESSURE: 120 MMHG | RESPIRATION RATE: 17 BRPM | DIASTOLIC BLOOD PRESSURE: 56 MMHG | WEIGHT: 293 LBS | BODY MASS INDEX: 50.02 KG/M2 | HEIGHT: 64 IN | TEMPERATURE: 98 F

## 2018-02-05 LAB
ALT SERPL W P-5'-P-CCNC: 12 U/L (ref 0–50)
ANION GAP SERPL CALCULATED.3IONS-SCNC: 9 MMOL/L (ref 3–14)
AST SERPL W P-5'-P-CCNC: 9 U/L (ref 0–45)
BUN SERPL-MCNC: 7 MG/DL (ref 7–30)
CALCIUM SERPL-MCNC: 8.6 MG/DL (ref 8.5–10.1)
CHLORIDE SERPL-SCNC: 105 MMOL/L (ref 94–109)
CO2 SERPL-SCNC: 22 MMOL/L (ref 20–32)
CREAT SERPL-MCNC: 0.38 MG/DL (ref 0.52–1.04)
CREAT UR-MCNC: 76 MG/DL
ERYTHROCYTE [DISTWIDTH] IN BLOOD BY AUTOMATED COUNT: 14.9 % (ref 10–15)
GFR SERPL CREATININE-BSD FRML MDRD: >90 ML/MIN/1.7M2
GLUCOSE SERPL-MCNC: 88 MG/DL (ref 70–99)
HCT VFR BLD AUTO: 31.9 % (ref 35–47)
HGB BLD-MCNC: 10.2 G/DL (ref 11.7–15.7)
MCH RBC QN AUTO: 27.2 PG (ref 26.5–33)
MCHC RBC AUTO-ENTMCNC: 32 G/DL (ref 31.5–36.5)
MCV RBC AUTO: 85 FL (ref 78–100)
PLATELET # BLD AUTO: 253 10E9/L (ref 150–450)
POTASSIUM SERPL-SCNC: 3.8 MMOL/L (ref 3.4–5.3)
PROT UR-MCNC: 0.1 G/L
PROT/CREAT 24H UR: 0.13 G/G CR (ref 0–0.2)
RBC # BLD AUTO: 3.75 10E12/L (ref 3.8–5.2)
SODIUM SERPL-SCNC: 136 MMOL/L (ref 133–144)
URATE SERPL-MCNC: 3 MG/DL (ref 2.6–6)
WBC # BLD AUTO: 11.3 10E9/L (ref 4–11)

## 2018-02-05 PROCEDURE — 36415 COLL VENOUS BLD VENIPUNCTURE: CPT | Performed by: OBSTETRICS & GYNECOLOGY

## 2018-02-05 PROCEDURE — 84450 TRANSFERASE (AST) (SGOT): CPT | Performed by: OBSTETRICS & GYNECOLOGY

## 2018-02-05 PROCEDURE — 96372 THER/PROPH/DIAG INJ SC/IM: CPT

## 2018-02-05 PROCEDURE — 25000128 H RX IP 250 OP 636: Performed by: OBSTETRICS & GYNECOLOGY

## 2018-02-05 PROCEDURE — 84550 ASSAY OF BLOOD/URIC ACID: CPT | Performed by: OBSTETRICS & GYNECOLOGY

## 2018-02-05 PROCEDURE — 84156 ASSAY OF PROTEIN URINE: CPT | Performed by: OBSTETRICS & GYNECOLOGY

## 2018-02-05 PROCEDURE — G0463 HOSPITAL OUTPT CLINIC VISIT: HCPCS | Mod: 25

## 2018-02-05 PROCEDURE — 25000132 ZZH RX MED GY IP 250 OP 250 PS 637: Performed by: OBSTETRICS & GYNECOLOGY

## 2018-02-05 PROCEDURE — 80048 BASIC METABOLIC PNL TOTAL CA: CPT | Performed by: OBSTETRICS & GYNECOLOGY

## 2018-02-05 PROCEDURE — 84460 ALANINE AMINO (ALT) (SGPT): CPT | Performed by: OBSTETRICS & GYNECOLOGY

## 2018-02-05 PROCEDURE — 85027 COMPLETE CBC AUTOMATED: CPT | Performed by: OBSTETRICS & GYNECOLOGY

## 2018-02-05 RX ORDER — ONDANSETRON 2 MG/ML
4 INJECTION INTRAMUSCULAR; INTRAVENOUS EVERY 6 HOURS PRN
Status: DISCONTINUED | OUTPATIENT
Start: 2018-02-05 | End: 2018-02-06 | Stop reason: HOSPADM

## 2018-02-05 RX ORDER — BETAMETHASONE SODIUM PHOSPHATE AND BETAMETHASONE ACETATE 3; 3 MG/ML; MG/ML
12 INJECTION, SUSPENSION INTRA-ARTICULAR; INTRALESIONAL; INTRAMUSCULAR; SOFT TISSUE ONCE
Status: COMPLETED | OUTPATIENT
Start: 2018-02-05 | End: 2018-02-05

## 2018-02-05 RX ORDER — ACETAMINOPHEN 325 MG/1
650 TABLET ORAL EVERY 4 HOURS PRN
Status: DISCONTINUED | OUTPATIENT
Start: 2018-02-05 | End: 2018-02-06 | Stop reason: HOSPADM

## 2018-02-05 RX ADMIN — BETAMETHASONE SODIUM PHOSPHATE AND BETAMETHASONE ACETATE 12 MG: 3; 3 INJECTION, SUSPENSION INTRA-ARTICULAR; INTRALESIONAL; INTRAMUSCULAR at 22:08

## 2018-02-05 RX ADMIN — ACETAMINOPHEN 650 MG: 325 TABLET, FILM COATED ORAL at 21:00

## 2018-02-05 NOTE — IP AVS SNAPSHOT
[facility] Labor and Delivery    [address]    [facility] [address]    Phone:  [phone]    Fax:  [phone]                                       After Visit Summary   2/5/2018    Kate Uribe    MRN: [id number]           After Visit Summary Signature Page     I have received my discharge instructions, and my questions have been answered. I have discussed any challenges I see with this plan with the nurse or doctor.    ..........................................................................................................................................  Patient/Patient Representative Signature      ..........................................................................................................................................  Patient Representative Print Name and Relationship to Patient    ..................................................               ................................................  Date                                            Time    ..........................................................................................................................................  Reviewed by Signature/Title    ...................................................              ..............................................  Date                                                            Time

## 2018-02-05 NOTE — IP AVS SNAPSHOT
MRN:[id number]                      After Visit Summary   2/5/2018    Kate Uribe    MRN: [id number]           Thank you!     Thank you for choosing [facility] for your care. Our goal is always to provide you with excellent care. Hearing back from our patients is one way we can continue to improve our services. Please take a few minutes to complete the written survey that you may receive in the mail after you visit. If you would like to speak to someone directly about your visit please contact Patient Relations at [phone]. Thank you!          Patient Information     Date Of Birth          1990        About your hospital stay     You were admitted on:  February 5, 2018 You last received care in the:  [facility] Labor and Delivery    You were discharged on:  February 5, 2018       Who to Call     For medical emergencies, please call [phone].  For non-urgent questions about your medical care, please call your primary care provider or clinic, [phone]          Attending Provider     Provider Specialty    Amanda Tubbs MD OB/Gyn       Primary Care Provider Office Phone # Fax #    Bibi Gamble [phone]      Your next 10 appointments already scheduled     Mar 08, 2018   Procedure with Sheldon Galaviz MD   [facility] Labor and Delivery (--)    [address] [facility] [address] [phone]              Further instructions from your care team       Discharge Instruction for Undelivered Patients      You were seen for: R/O PIH  We Consulted: Dr RODOLFO Tubbs  You had (Test or Medicine):serial BPs and lab work     Diet:   Drink 8 to 12 glasses of liquids (milk, juice, water) every day.  You may eat meals and snacks.     Activity:  Call your doctor or nurse midwife if your baby is moving less than usual.     Call your provider if you notice:  Swelling in your face or increased swelling in your hands or legs.  Headaches that are  "not relieved by Tylenol (acetaminophen).  Changes in your vision (blurring: seeing spots or stars.)  Nausea (sick to your stomach) and vomiting (throwing up).   Weight gain of 5 pounds or more per week.  Heartburn that doesn't go away.  Signs of bladder infection: pain when you urinate (use the toilet), need to go more often and more urgently.  The bag of paige (rupture of membranes) breaks, or you notice leaking in your underwear.  Bright red blood in your underwear.  Abdominal (lower belly) or stomach pain.  Second (plus) baby: Contractions (tightening) less than 10 minutes apart and getting stronger.  Increase or change in vaginal discharge (note the color and amount)      Follow-up:  Make an appointment to be seen on 2/7/18   **get betamethasone at appointment  **bring 24-urine to clinic          Pending Results     No orders found from 2/3/2018 to 2/6/2018.            Admission Information     Date & Time Provider Department Dept. Phone    2/5/2018 Amanda Tubbs MD [facility] Labor and Delivery [phone]      Your Vitals Were     Blood Pressure Temperature Respirations Height Weight Last Period    120/56 98  F (36.7  C) (Oral) 17 1.626 m (5' 4\") 137.9 kg (304 lb) 06/09/2017    BMI (Body Mass Index)                   52.18 kg/m2           MyChart Information     [employer] gives you secure access to your electronic health record. If you see a primary care provider, you can also send messages to your care team and make appointments. If you have questions, please call your primary care clinic.  If you do not have a primary care provider, please call [phone] and they will assist you.        Care EveryWhere ID     This is your Care EveryWhere ID. This could be used by other organizations to access your [place] medical records  [id number]        Equal Access to Services     CHRISTINE DENNIS: Holly Aguayo, izaiah solo, rosendo go " ah. So [facility] [phone].    ATENCIÓN: Si habla conor, tiene a sandhu disposición servicios gratuitos de asistencia lingüística. Lauren al [phone].    We comply with applicable federal civil rights laws and [place] laws. We do not discriminate on the basis of race, color, national origin, age, disability, sex, sexual orientation, or gender identity.               Review of your medicines      UNREVIEWED medicines. Ask your doctor about these medicines        Dose / Directions    albuterol 108 (90 BASE) MCG/ACT Inhaler   Commonly known as:  PROAIR HFA/PROVENTIL HFA/VENTOLIN HFA   Used for:  Mild intermittent asthma in adult without complication        Dose:  2 puff   Inhale 2 puffs into the lungs every 6 hours   Quantity:  1 Inhaler   Refills:  11       fluticasone 110 MCG/ACT Inhaler   Commonly known as:  FLOVENT HFA   Used for:  Moderate persistent asthma with exacerbation        Dose:  2 puff   Inhale 2 puffs into the lungs 2 times daily   Quantity:  1 Inhaler   Refills:  1       prenatal multivitamin plus iron 27-0.8 MG Tabs per tablet        Dose:  1 tablet   Take 1 tablet by mouth daily   Refills:  0       REMICADE IV        Dose:  500 mg   Inject 500 mg into the vein   Refills:  0       sertraline 100 MG tablet   Commonly known as:  ZOLOFT   Used for:  Anxiety        Dose:  100 mg   Take 1 tablet (100 mg) by mouth daily   Quantity:  90 tablet   Refills:  1                Protect others around you: Learn how to safely use, store and throw away your medicines at www.disposemymeds.org.             Medication List: This is a list of all your medications and when to take them. Check marks below indicate your daily home schedule. Keep this list as a reference.      Medications           Morning Afternoon Evening Bedtime As Needed    albuterol 108 (90 BASE) MCG/ACT Inhaler   Commonly known as:  PROAIR HFA/PROVENTIL HFA/VENTOLIN HFA   Inhale 2 puffs into the lungs every 6 hours                                 fluticasone 110 MCG/ACT Inhaler   Commonly known as:  FLOVENT HFA   Inhale 2 puffs into the lungs 2 times daily                                prenatal multivitamin plus iron 27-0.8 MG Tabs per tablet   Take 1 tablet by mouth daily                                REMICADE IV   Inject 500 mg into the vein                                sertraline 100 MG tablet   Commonly known as:  ZOLOFT   Take 1 tablet (100 mg) by mouth daily

## 2018-02-06 NOTE — DISCHARGE INSTRUCTIONS
Discharge Instruction for Undelivered Patients      You were seen for: R/O PIH  We Consulted: Dr RODOLFO Tubbs  You had (Test or Medicine):serial BPs and lab work     Diet:   Drink 8 to 12 glasses of liquids (milk, juice, water) every day.  You may eat meals and snacks.     Activity:  Call your doctor or nurse midwife if your baby is moving less than usual.     Call your provider if you notice:  Swelling in your face or increased swelling in your hands or legs.  Headaches that are not relieved by Tylenol (acetaminophen).  Changes in your vision (blurring: seeing spots or stars.)  Nausea (sick to your stomach) and vomiting (throwing up).   Weight gain of 5 pounds or more per week.  Heartburn that doesn't go away.  Signs of bladder infection: pain when you urinate (use the toilet), need to go more often and more urgently.  The bag of paige (rupture of membranes) breaks, or you notice leaking in your underwear.  Bright red blood in your underwear.  Abdominal (lower belly) or stomach pain.  Second (plus) baby: Contractions (tightening) less than 10 minutes apart and getting stronger.  Increase or change in vaginal discharge (note the color and amount)      Follow-up:  Make an appointment to be seen on 2/7/18   **get betamethasone at appointment  **bring 24-urine to clinic

## 2018-02-06 NOTE — PROGRESS NOTES
Data: [facility] for hypertension disorder of pregnancy.  Cervical exam not examined.  Membranes intact.  Contractions intermittent.  Action:  Presumed adequate fetal oxygenation documented (see flow record). Discharge instructions reviewed.  Patient instructed to report change in fetal movement, vaginal leaking of fluid or bleeding, abdominal pain, or any concerns related to the pregnancy to her nurse/physician.    Response: Orders to discharge home per Amanda Tubbs.  Patient verbalized understanding of education and verbalized agreement with plan. Discharged to home.

## 2018-02-06 NOTE — PROVIDER NOTIFICATION
02/05/18 2043   Provider Notification   Provider Name/Title Dr RODOLFO Tubbs   Method of Notification Phone   Request Evaluate - Remote   Notification Reason Pain   MD updated of patient arrival and assessment--see previous note. OK for tylenol at this time for headache that pt rates 7/10. Lab here for lab draw at this time, will continue to monitor and update as needed.

## 2018-02-06 NOTE — PLAN OF CARE
Data: Patient presented to [facility] at 2018  7:53 PM.  Reason for maternal/fetal assessment is continued headache unrelieved for the past three days and a high blood pressure noted at home this evening. Patient reports sinus cold which may be contributing to the headache, but she is unsure .  Patient is a .  Prenatal record reviewed. Pregnancy has been  has been complicated by chrons thus far.  Gestational Age 34w3d. VSS. Fetal movement present. Patient denies uterine contractions, leaking of vaginal fluid/rupture of membranes, vaginal bleeding, abdominal pain, pelvic pressure, nausea, vomiting, visual disturbances, epigastric or URQ pain, significant edema.  One beat of clonus noted bilaterally. Support person  Lawrence is present.   Action: Verbal consent for EFM. Triage assessment completed. Bill of rights reviewed.  Response: Patient verbalized agreement with plan. Will contact Dr Vik Daniels with update and further orders.

## 2018-02-06 NOTE — PROVIDER NOTIFICATION
02/05/18 2151   Provider Notification   Provider Name/Title Dr RODOLFO Tubbs   Method of Notification Phone   Request Evaluate - Remote   Notification Reason Lab/Diagnostic Study;Status Update   MD updated of BPs--see flowsheets and Lab results. Orders for first dose of betamethasone now, pt to stay home from work tomorrow, and make appointment to be seen on Wednesday 2/7/18. Pt to get second dose of betamethasone and will bring 24 urine collection to be run that time.

## 2018-02-07 ENCOUNTER — TELEPHONE (OUTPATIENT)
Dept: PEDIATRICS | Facility: CLINIC | Age: 28
End: 2018-02-07

## 2018-02-07 DIAGNOSIS — J01.90 ACUTE SINUSITIS WITH SYMPTOMS > 10 DAYS: Primary | ICD-10-CM

## 2018-02-07 RX ORDER — CEFDINIR 300 MG/1
300 CAPSULE ORAL 2 TIMES DAILY
Qty: 20 CAPSULE | Refills: 0 | Status: SHIPPED | OUTPATIENT
Start: 2018-02-07 | End: 2018-03-08

## 2018-02-07 NOTE — TELEPHONE ENCOUNTER
Patient continues having sinus symptoms. Pregnant with Crohns. Works in clinic. Tolerates cephalosporins. Proceed with antibiotics as directed.   Miguel Abbasi PA-C

## 2018-02-09 ENCOUNTER — HOSPITAL ENCOUNTER (OUTPATIENT)
Facility: CLINIC | Age: 28
Discharge: HOME | End: 2018-02-09
Attending: OBSTETRICS & GYNECOLOGY | Admitting: OBSTETRICS & GYNECOLOGY
Payer: COMMERCIAL

## 2018-02-09 VITALS
DIASTOLIC BLOOD PRESSURE: 59 MMHG | SYSTOLIC BLOOD PRESSURE: 119 MMHG | WEIGHT: 293 LBS | HEIGHT: 64 IN | RESPIRATION RATE: 18 BRPM | TEMPERATURE: 98 F | BODY MASS INDEX: 50.02 KG/M2

## 2018-02-09 LAB
ALT SERPL W P-5'-P-CCNC: 13 U/L (ref 0–50)
ANION GAP SERPL CALCULATED.3IONS-SCNC: 5 MMOL/L (ref 3–14)
AST SERPL W P-5'-P-CCNC: 11 U/L (ref 0–45)
BUN SERPL-MCNC: 12 MG/DL (ref 7–30)
CALCIUM SERPL-MCNC: 8.8 MG/DL (ref 8.5–10.1)
CHLORIDE SERPL-SCNC: 101 MMOL/L (ref 94–109)
CO2 SERPL-SCNC: 29 MMOL/L (ref 20–32)
CREAT SERPL-MCNC: 0.51 MG/DL (ref 0.52–1.04)
CREAT UR-MCNC: 60 MG/DL
ERYTHROCYTE [DISTWIDTH] IN BLOOD BY AUTOMATED COUNT: 15.1 % (ref 10–15)
GFR SERPL CREATININE-BSD FRML MDRD: >90 ML/MIN/1.7M2
GLUCOSE SERPL-MCNC: 85 MG/DL (ref 70–99)
HCT VFR BLD AUTO: 32.7 % (ref 35–47)
HGB BLD-MCNC: 9.9 G/DL (ref 11.7–15.7)
MCH RBC QN AUTO: 26.4 PG (ref 26.5–33)
MCHC RBC AUTO-ENTMCNC: 30.3 G/DL (ref 31.5–36.5)
MCV RBC AUTO: 87 FL (ref 78–100)
PLATELET # BLD AUTO: 237 10E9/L (ref 150–450)
POTASSIUM SERPL-SCNC: 3.8 MMOL/L (ref 3.4–5.3)
PROT UR-MCNC: 0.1 G/L
PROT/CREAT 24H UR: 0.16 G/G CR (ref 0–0.2)
RBC # BLD AUTO: 3.75 10E12/L (ref 3.8–5.2)
SODIUM SERPL-SCNC: 135 MMOL/L (ref 133–144)
URATE SERPL-MCNC: 2.8 MG/DL (ref 2.6–6)
WBC # BLD AUTO: 14.3 10E9/L (ref 4–11)

## 2018-02-09 PROCEDURE — 80048 BASIC METABOLIC PNL TOTAL CA: CPT | Performed by: OBSTETRICS & GYNECOLOGY

## 2018-02-09 PROCEDURE — G0463 HOSPITAL OUTPT CLINIC VISIT: HCPCS | Mod: 25

## 2018-02-09 PROCEDURE — 36415 COLL VENOUS BLD VENIPUNCTURE: CPT | Performed by: OBSTETRICS & GYNECOLOGY

## 2018-02-09 PROCEDURE — 59025 FETAL NON-STRESS TEST: CPT

## 2018-02-09 PROCEDURE — 84460 ALANINE AMINO (ALT) (SGPT): CPT | Performed by: OBSTETRICS & GYNECOLOGY

## 2018-02-09 PROCEDURE — 84550 ASSAY OF BLOOD/URIC ACID: CPT | Performed by: OBSTETRICS & GYNECOLOGY

## 2018-02-09 PROCEDURE — 84156 ASSAY OF PROTEIN URINE: CPT | Performed by: OBSTETRICS & GYNECOLOGY

## 2018-02-09 PROCEDURE — 82565 ASSAY OF CREATININE: CPT | Performed by: OBSTETRICS & GYNECOLOGY

## 2018-02-09 PROCEDURE — 85027 COMPLETE CBC AUTOMATED: CPT | Performed by: OBSTETRICS & GYNECOLOGY

## 2018-02-09 PROCEDURE — 84450 TRANSFERASE (AST) (SGOT): CPT | Performed by: OBSTETRICS & GYNECOLOGY

## 2018-02-09 RX ORDER — ONDANSETRON 2 MG/ML
4 INJECTION INTRAMUSCULAR; INTRAVENOUS EVERY 6 HOURS PRN
Status: DISCONTINUED | OUTPATIENT
Start: 2018-02-09 | End: 2018-02-10 | Stop reason: HOSPADM

## 2018-02-09 NOTE — IP AVS SNAPSHOT
[facility] Labor and Delivery    [address]    [facility] [address]    Phone:  [phone]    Fax:  [phone]                                       After Visit Summary   2/9/2018    Kate Uribe    MRN: [id number]           After Visit Summary Signature Page     I have received my discharge instructions, and my questions have been answered. I have discussed any challenges I see with this plan with the nurse or doctor.    ..........................................................................................................................................  Patient/Patient Representative Signature      ..........................................................................................................................................  Patient Representative Print Name and Relationship to Patient    ..................................................               ................................................  Date                                            Time    ..........................................................................................................................................  Reviewed by Signature/Title    ...................................................              ..............................................  Date                                                            Time

## 2018-02-09 NOTE — IP AVS SNAPSHOT
MRN:[id number]                      After Visit Summary   2/9/2018    Kate Uribe    MRN: [id number]           Thank you!     Thank you for choosing [facility] for your care. Our goal is always to provide you with excellent care. Hearing back from our patients is one way we can continue to improve our services. Please take a few minutes to complete the written survey that you may receive in the mail after you visit. If you would like to speak to someone directly about your visit please contact Patient Relations at [phone]. Thank you!          Patient Information     Date Of Birth          1990        About your hospital stay     You were admitted on:  February 9, 2018 You last received care in the:  [facility] Labor and Delivery    You were discharged on:  February 9, 2018       Who to Call     For medical emergencies, please call 911.  For non-urgent questions about your medical care, please call your primary care provider or clinic, [phone]          Attending Provider     Provider Specialty    Randy Frey MD OB/Gyn       Primary Care Provider Office Phone # Fax #    Bibi Gamble [phone]      Your next 10 appointments already scheduled     Feb 23, 2018   Procedure with Magdalena Davalos MD   [facility] Labor and Delivery (--)    [address] [facility] [address] [phone]              Further instructions from your care team       Discharge Instruction for Undelivered Patients      You were seen for: preeclampsia evaluation  We Consulted: Dr. Frey  You had (Test or Medicine):non stress test, fetal monitors, preeclampsia evaluation labs     Diet:   Drink 8 to 12 glasses of liquids (milk, juice, water) every day.     Activity:  Call your doctor or nurse midwife if your baby is moving less than usual.     Call your provider if you notice:  Swelling in your face or increased swelling in  your hands or legs.  Headaches that are not relieved by Tylenol (acetaminophen).  Changes in your vision (blurring: seeing spots or stars.)  Nausea (sick to your stomach) and vomiting (throwing up).   Weight gain of 5 pounds or more per week.  Heartburn that doesn't go away.  Signs of bladder infection: pain when you urinate (use the toilet), need to go more often and more urgently.  The bag of paige (rupture of membranes) breaks, or you notice leaking in your underwear.  Bright red blood in your underwear.  Abdominal (lower belly) or stomach pain.  Contractions (tightening) less than 10 minutes apart and getting stronger.  Increase or change in vaginal discharge (note the color and amount)    Follow-up:  Make an appointment to be seen on this week.          Pending Results     No orders found from 2/7/2018 to 2/10/2018.            Admission Information     Date & Time Provider Department Dept. Phone    2/9/2018 Randy Frey MD [facility] Labor and Delivery [phone]      Your Vitals Were     Blood Pressure Temperature Last Period             113/58 98  F (36.7  C) 06/09/2017         [employer] Information     [employer] gives you secure access to your electronic health record. If you see a primary care provider, you can also send messages to your care team and make appointments. If you have questions, please call your primary care clinic.  If you do not have a primary care provider, please call [phone] and they will assist you.        Care EveryWhere ID     This is your Care EveryWhere ID. This could be used by other organizations to access your [place] medical records  [id number]        Equal Access to Services     [facility]: Hadii pancho Aguayo, waaxda luqadaha, qaybta karosendo gleason. So [facility] [phone].    ATENCIÓN: Si habla español, tiene a sandhu disposición servicios gratuitos de asistencia lingüística. Llame al  [phone].    We comply with applicable federal civil rights laws and [place] laws. We do not discriminate on the basis of race, color, national origin, age, disability, sex, sexual orientation, or gender identity.               Review of your medicines      UNREVIEWED medicines. Ask your doctor about these medicines        Dose / Directions    albuterol 108 (90 BASE) MCG/ACT Inhaler   Commonly known as:  PROAIR HFA/PROVENTIL HFA/VENTOLIN HFA   Used for:  Mild intermittent asthma in adult without complication        Dose:  2 puff   Inhale 2 puffs into the lungs every 6 hours   Quantity:  1 Inhaler   Refills:  11       cefdinir 300 MG capsule   Commonly known as:  OMNICEF   Used for:  Acute sinusitis with symptoms > 10 days        Dose:  300 mg   Take 1 capsule (300 mg) by mouth 2 times daily   Quantity:  20 capsule   Refills:  0       ENTOCORT EC PO        Take by mouth every morning   Refills:  0       fluticasone 110 MCG/ACT Inhaler   Commonly known as:  FLOVENT HFA   Used for:  Moderate persistent asthma with exacerbation        Dose:  2 puff   Inhale 2 puffs into the lungs 2 times daily   Quantity:  1 Inhaler   Refills:  1       IMURAN PO        Refills:  0       prenatal multivitamin plus iron 27-0.8 MG Tabs per tablet        Dose:  1 tablet   Take 1 tablet by mouth daily   Refills:  0       REMICADE IV        Dose:  500 mg   Inject 500 mg into the vein   Refills:  0       sertraline 100 MG tablet   Commonly known as:  ZOLOFT   Used for:  Anxiety        Dose:  100 mg   Take 1 tablet (100 mg) by mouth daily   Quantity:  90 tablet   Refills:  1                Protect others around you: Learn how to safely use, store and throw away your medicines at www.disposemymeds.org.             Medication List: This is a list of all your medications and when to take them. Check marks below indicate your daily home schedule. Keep this list as a reference.      Medications           Morning Afternoon Evening Bedtime As  Needed    albuterol 108 (90 BASE) MCG/ACT Inhaler   Commonly known as:  PROAIR HFA/PROVENTIL HFA/VENTOLIN HFA   Inhale 2 puffs into the lungs every 6 hours                                cefdinir 300 MG capsule   Commonly known as:  OMNICEF   Take 1 capsule (300 mg) by mouth 2 times daily                                ENTOCORT EC PO   Take by mouth every morning                                fluticasone 110 MCG/ACT Inhaler   Commonly known as:  FLOVENT HFA   Inhale 2 puffs into the lungs 2 times daily                                IMURAN PO                                prenatal multivitamin plus iron 27-0.8 MG Tabs per tablet   Take 1 tablet by mouth daily                                REMICADE IV   Inject 500 mg into the vein                                sertraline 100 MG tablet   Commonly known as:  ZOLOFT   Take 1 tablet (100 mg) by mouth daily

## 2018-02-10 NOTE — PLAN OF CARE
Data: [facility]: 2018  9:31 PM.  Reason for maternal/fetal assessment is hypertension disorder of pregnancy, elevated blood pressures, headache. Patient reports higher blood pressures x 1.5 months, had preeclampsia evaluation here on .  Patient states BPs at home were 150s/70s, but questions the accuracy of her machine.  Patient also reports a headache that began around 1700 this evening, describes as sharp and achy, radiates down neck and shoulders, rates 5/10 for pain.  Describes it as different than a headache she's had before.  Patient is a .  Prenatal record reviewed. Pregnancy  has been complicated by gestational hypertension.  Gestational Age 35w0d. VSS. Fetal movement present. Patient denies uterine contractions, leaking of vaginal fluid/rupture of membranes, vaginal bleeding, abdominal pain, pelvic pressure, nausea, vomiting, visual disturbances, epigastric or URQ pain, significant edema. Support person is present.   Action: Verbal consent for EFM. Triage assessment completed. Bill of rights reviewed.  Response: Patient verbalized agreement with plan. Will contact Dr Randy Zavala with update and further orders.

## 2018-02-10 NOTE — PROVIDER NOTIFICATION
02/09/18 1427   Provider Notification   Provider Name/Title Dr. Frey   Method of Notification Phone   Request Evaluate - Remote   Notification Reason Patient Arrived;Pain;Lab/Diagnostic Study;Status Update   Discharge orders received, patient should f/u in clinic this week.

## 2018-02-10 NOTE — PLAN OF CARE
Data: Patient assessed in the [facility] for hypertension disorder of pregnancy, elevated blood pressures.  Cervical exam not examined.  Membranes intact.  Contractions rare.  Action:  Presumed adequate fetal oxygenation documented (see flow record). Discharge instructions reviewed.  Patient instructed to report change in fetal movement, vaginal leaking of fluid or bleeding, abdominal pain, or any concerns related to the pregnancy to her nurse/physician.    Response: Orders to discharge home per .  Patient verbalized understanding of education and verbalized agreement with plan. Discharged to home at 2300.

## 2018-02-10 NOTE — DISCHARGE INSTRUCTIONS
Discharge Instruction for Undelivered Patients      You were seen for: preeclampsia evaluation  We Consulted: Dr. Frey  You had (Test or Medicine):non stress test, fetal monitors, preeclampsia evaluation labs     Diet:   Drink 8 to 12 glasses of liquids (milk, juice, water) every day.     Activity:  Call your doctor or nurse midwife if your baby is moving less than usual.     Call your provider if you notice:  Swelling in your face or increased swelling in your hands or legs.  Headaches that are not relieved by Tylenol (acetaminophen).  Changes in your vision (blurring: seeing spots or stars.)  Nausea (sick to your stomach) and vomiting (throwing up).   Weight gain of 5 pounds or more per week.  Heartburn that doesn't go away.  Signs of bladder infection: pain when you urinate (use the toilet), need to go more often and more urgently.  The bag of paige (rupture of membranes) breaks, or you notice leaking in your underwear.  Bright red blood in your underwear.  Abdominal (lower belly) or stomach pain.  Contractions (tightening) less than 10 minutes apart and getting stronger.  Increase or change in vaginal discharge (note the color and amount)    Follow-up:  Make an appointment to be seen on this week.

## 2018-02-18 NOTE — PHARMACY-ADMISSION MEDICATION HISTORY
Admission medication history interview status for this patient is complete. See [facility] admission navigator for allergy information, prior to admission medications and immunization status.     Med rec completed by pre admitting Manjula Mansfield RN ria Feb 15, 2018  6:30 PM       Prior to Admission medications    Medication Sig Last Dose Taking? Auth Provider   AzaTHIOprine (IMURAN PO) Take 50 mg by mouth 2 times daily   Yes Reported, Patient   Budesonide (ENTOCORT EC PO) Take 3 mg by mouth every morning   Yes Reported, Patient   cefdinir (OMNICEF) 300 MG capsule Take 1 capsule (300 mg) by mouth 2 times daily  Yes Miguel Abbasi PA-C   fluticasone (FLOVENT HFA) 110 MCG/ACT Inhaler Inhale 2 puffs into the lungs 2 times daily  Yes Miguel Abbasi PA-C   albuterol (PROAIR HFA/PROVENTIL HFA/VENTOLIN HFA) 108 (90 BASE) MCG/ACT Inhaler Inhale 2 puffs into the lungs every 6 hours  Yes Bibi Gamble MD   sertraline (ZOLOFT) 100 MG tablet Take 1 tablet (100 mg) by mouth daily  Yes Bibi Gamble MD   InFLIXimab (REMICADE IV) Inject 500 mg into the vein Every 8 weeks  Yes Reported, Patient   Prenatal Vit-Fe Fumarate-FA (PRENATAL MULTIVITAMIN PLUS IRON) 27-0.8 MG TABS per tablet Take 1 tablet by mouth daily   Reported, Patient

## 2018-02-22 ENCOUNTER — HOSPITAL ENCOUNTER (OUTPATIENT)
Dept: LAB | Facility: CLINIC | Age: 28
Discharge: HOME | End: 2018-02-22
Attending: OBSTETRICS & GYNECOLOGY | Admitting: OBSTETRICS & GYNECOLOGY
Payer: COMMERCIAL

## 2018-02-22 DIAGNOSIS — Z01.812 PRE-OPERATIVE LABORATORY EXAMINATION: ICD-10-CM

## 2018-02-22 LAB
ABO + RH BLD: NORMAL
ABO + RH BLD: NORMAL
BLD GP AB SCN SERPL QL: NORMAL
BLOOD BANK CMNT PATIENT-IMP: NORMAL
SPECIMEN EXP DATE BLD: NORMAL

## 2018-02-22 PROCEDURE — 86901 BLOOD TYPING SEROLOGIC RH(D): CPT | Performed by: OBSTETRICS & GYNECOLOGY

## 2018-02-22 PROCEDURE — 86850 RBC ANTIBODY SCREEN: CPT | Performed by: OBSTETRICS & GYNECOLOGY

## 2018-02-22 PROCEDURE — 86900 BLOOD TYPING SEROLOGIC ABO: CPT | Performed by: OBSTETRICS & GYNECOLOGY

## 2018-02-22 PROCEDURE — 36415 COLL VENOUS BLD VENIPUNCTURE: CPT | Performed by: OBSTETRICS & GYNECOLOGY

## 2018-02-23 ENCOUNTER — HOSPITAL ENCOUNTER (INPATIENT)
Facility: CLINIC | Age: 28
LOS: 3 days | Discharge: HOME | End: 2018-02-26
Attending: OBSTETRICS & GYNECOLOGY | Admitting: OBSTETRICS & GYNECOLOGY
Payer: COMMERCIAL

## 2018-02-23 ENCOUNTER — ANESTHESIA (OUTPATIENT)
Dept: OBGYN | Facility: CLINIC | Age: 28
End: 2018-02-23
Payer: COMMERCIAL

## 2018-02-23 ENCOUNTER — ANESTHESIA EVENT (OUTPATIENT)
Dept: OBGYN | Facility: CLINIC | Age: 28
End: 2018-02-23
Payer: COMMERCIAL

## 2018-02-23 LAB
HGB BLD-MCNC: 10 G/DL (ref 11.7–15.7)
T PALLIDUM IGG+IGM SER QL: NEGATIVE

## 2018-02-23 PROCEDURE — 71000012 ZZH RECOVERY PHASE 1 LEVEL 1 FIRST HR: Performed by: OBSTETRICS & GYNECOLOGY

## 2018-02-23 PROCEDURE — 37000009 ZZH ANESTHESIA TECHNICAL FEE, EACH ADDTL 15 MIN: Performed by: OBSTETRICS & GYNECOLOGY

## 2018-02-23 PROCEDURE — 0UB40ZZ EXCISION OF UTERINE SUPPORTING STRUCTURE, OPEN APPROACH: ICD-10-PCS | Performed by: OBSTETRICS & GYNECOLOGY

## 2018-02-23 PROCEDURE — 27210794 ZZH OR GENERAL SUPPLY STERILE: Performed by: OBSTETRICS & GYNECOLOGY

## 2018-02-23 PROCEDURE — 25000128 H RX IP 250 OP 636: Performed by: OBSTETRICS & GYNECOLOGY

## 2018-02-23 PROCEDURE — 37000008 ZZH ANESTHESIA TECHNICAL FEE, 1ST 30 MIN: Performed by: OBSTETRICS & GYNECOLOGY

## 2018-02-23 PROCEDURE — 36000058 ZZH SURGERY LEVEL 3 EA 15 ADDTL MIN: Performed by: OBSTETRICS & GYNECOLOGY

## 2018-02-23 PROCEDURE — 25000128 H RX IP 250 OP 636: Performed by: NURSE ANESTHETIST, CERTIFIED REGISTERED

## 2018-02-23 PROCEDURE — 85018 HEMOGLOBIN: CPT | Performed by: OBSTETRICS & GYNECOLOGY

## 2018-02-23 PROCEDURE — 25000132 ZZH RX MED GY IP 250 OP 250 PS 637: Performed by: OBSTETRICS & GYNECOLOGY

## 2018-02-23 PROCEDURE — 36000056 ZZH SURGERY LEVEL 3 1ST 30 MIN: Performed by: OBSTETRICS & GYNECOLOGY

## 2018-02-23 PROCEDURE — 12000029 ZZH R&B OB INTERMEDIATE

## 2018-02-23 PROCEDURE — 88304 TISSUE EXAM BY PATHOLOGIST: CPT | Performed by: OBSTETRICS & GYNECOLOGY

## 2018-02-23 PROCEDURE — 25000125 ZZHC RX 250: Performed by: NURSE ANESTHETIST, CERTIFIED REGISTERED

## 2018-02-23 PROCEDURE — 86780 TREPONEMA PALLIDUM: CPT | Performed by: OBSTETRICS & GYNECOLOGY

## 2018-02-23 PROCEDURE — 88304 TISSUE EXAM BY PATHOLOGIST: CPT | Mod: 26 | Performed by: OBSTETRICS & GYNECOLOGY

## 2018-02-23 RX ORDER — HYDROMORPHONE HYDROCHLORIDE 1 MG/ML
.3-.5 INJECTION, SOLUTION INTRAMUSCULAR; INTRAVENOUS; SUBCUTANEOUS EVERY 30 MIN PRN
Status: DISCONTINUED | OUTPATIENT
Start: 2018-02-23 | End: 2018-02-26 | Stop reason: HOSPADM

## 2018-02-23 RX ORDER — HYDROXYZINE HYDROCHLORIDE 25 MG/1
25 TABLET, FILM COATED ORAL EVERY 6 HOURS PRN
Status: DISCONTINUED | OUTPATIENT
Start: 2018-02-23 | End: 2018-02-26 | Stop reason: HOSPADM

## 2018-02-23 RX ORDER — ACETAMINOPHEN 325 MG/1
650 TABLET ORAL EVERY 4 HOURS PRN
Status: DISCONTINUED | OUTPATIENT
Start: 2018-02-26 | End: 2018-02-26

## 2018-02-23 RX ORDER — PROCHLORPERAZINE 25 MG
25 SUPPOSITORY, RECTAL RECTAL EVERY 12 HOURS PRN
Status: DISCONTINUED | OUTPATIENT
Start: 2018-02-23 | End: 2018-02-26 | Stop reason: HOSPADM

## 2018-02-23 RX ORDER — CITRIC ACID/SODIUM CITRATE 334-500MG
30 SOLUTION, ORAL ORAL
Status: COMPLETED | OUTPATIENT
Start: 2018-02-23 | End: 2018-02-23

## 2018-02-23 RX ORDER — IBUPROFEN 400 MG/1
400 TABLET, FILM COATED ORAL EVERY 6 HOURS PRN
Status: DISCONTINUED | OUTPATIENT
Start: 2018-02-24 | End: 2018-02-26 | Stop reason: HOSPADM

## 2018-02-23 RX ORDER — CARBOPROST TROMETHAMINE 250 UG/ML
250 INJECTION, SOLUTION INTRAMUSCULAR
Status: DISCONTINUED | OUTPATIENT
Start: 2018-02-23 | End: 2018-02-26 | Stop reason: HOSPADM

## 2018-02-23 RX ORDER — HYDROXYZINE HYDROCHLORIDE 50 MG/1
50 TABLET, FILM COATED ORAL EVERY 6 HOURS PRN
Status: DISCONTINUED | OUTPATIENT
Start: 2018-02-23 | End: 2018-02-26 | Stop reason: HOSPADM

## 2018-02-23 RX ORDER — MORPHINE SULFATE 1 MG/ML
INJECTION, SOLUTION EPIDURAL; INTRATHECAL; INTRAVENOUS PRN
Status: DISCONTINUED | OUTPATIENT
Start: 2018-02-23 | End: 2018-02-23

## 2018-02-23 RX ORDER — OXYTOCIN/0.9 % SODIUM CHLORIDE 30/500 ML
100 PLASTIC BAG, INJECTION (ML) INTRAVENOUS CONTINUOUS
Status: DISCONTINUED | OUTPATIENT
Start: 2018-02-23 | End: 2018-02-25 | Stop reason: CLARIF

## 2018-02-23 RX ORDER — IBUPROFEN 800 MG/1
800 TABLET, FILM COATED ORAL EVERY 6 HOURS PRN
Status: DISCONTINUED | OUTPATIENT
Start: 2018-02-24 | End: 2018-02-26 | Stop reason: HOSPADM

## 2018-02-23 RX ORDER — OXYTOCIN/0.9 % SODIUM CHLORIDE 30/500 ML
340 PLASTIC BAG, INJECTION (ML) INTRAVENOUS CONTINUOUS PRN
Status: DISCONTINUED | OUTPATIENT
Start: 2018-02-23 | End: 2018-02-26 | Stop reason: HOSPADM

## 2018-02-23 RX ORDER — LANOLIN 100 %
OINTMENT (GRAM) TOPICAL
Status: DISCONTINUED | OUTPATIENT
Start: 2018-02-23 | End: 2018-02-26 | Stop reason: HOSPADM

## 2018-02-23 RX ORDER — BUPIVACAINE HYDROCHLORIDE 7.5 MG/ML
INJECTION, SOLUTION INTRASPINAL PRN
Status: DISCONTINUED | OUTPATIENT
Start: 2018-02-23 | End: 2018-02-23

## 2018-02-23 RX ORDER — FENTANYL CITRATE 50 UG/ML
INJECTION, SOLUTION INTRAMUSCULAR; INTRAVENOUS PRN
Status: DISCONTINUED | OUTPATIENT
Start: 2018-02-23 | End: 2018-02-23

## 2018-02-23 RX ORDER — OXYTOCIN 10 [USP'U]/ML
10 INJECTION, SOLUTION INTRAMUSCULAR; INTRAVENOUS
Status: DISCONTINUED | OUTPATIENT
Start: 2018-02-23 | End: 2018-02-26 | Stop reason: HOSPADM

## 2018-02-23 RX ORDER — CEFAZOLIN SODIUM 1 G/3ML
1 INJECTION, POWDER, FOR SOLUTION INTRAMUSCULAR; INTRAVENOUS SEE ADMIN INSTRUCTIONS
Status: DISCONTINUED | OUTPATIENT
Start: 2018-02-23 | End: 2018-02-23 | Stop reason: HOSPADM

## 2018-02-23 RX ORDER — EPHEDRINE SULFATE 50 MG/ML
INJECTION, SOLUTION INTRAVENOUS PRN
Status: DISCONTINUED | OUTPATIENT
Start: 2018-02-23 | End: 2018-02-23

## 2018-02-23 RX ORDER — HYDROCORTISONE 2.5 %
CREAM (GRAM) TOPICAL 3 TIMES DAILY PRN
Status: DISCONTINUED | OUTPATIENT
Start: 2018-02-23 | End: 2018-02-26 | Stop reason: HOSPADM

## 2018-02-23 RX ORDER — DEXTROSE, SODIUM CHLORIDE, SODIUM LACTATE, POTASSIUM CHLORIDE, AND CALCIUM CHLORIDE 5; .6; .31; .03; .02 G/100ML; G/100ML; G/100ML; G/100ML; G/100ML
INJECTION, SOLUTION INTRAVENOUS CONTINUOUS
Status: DISCONTINUED | OUTPATIENT
Start: 2018-02-23 | End: 2018-02-25 | Stop reason: CLARIF

## 2018-02-23 RX ORDER — DIPHENHYDRAMINE HYDROCHLORIDE 50 MG/ML
25 INJECTION INTRAMUSCULAR; INTRAVENOUS EVERY 6 HOURS PRN
Status: DISCONTINUED | OUTPATIENT
Start: 2018-02-23 | End: 2018-02-26 | Stop reason: HOSPADM

## 2018-02-23 RX ORDER — BISACODYL 10 MG
10 SUPPOSITORY, RECTAL RECTAL DAILY PRN
Status: DISCONTINUED | OUTPATIENT
Start: 2018-02-25 | End: 2018-02-26 | Stop reason: HOSPADM

## 2018-02-23 RX ORDER — OXYTOCIN/0.9 % SODIUM CHLORIDE 30/500 ML
PLASTIC BAG, INJECTION (ML) INTRAVENOUS PRN
Status: DISCONTINUED | OUTPATIENT
Start: 2018-02-23 | End: 2018-02-23

## 2018-02-23 RX ORDER — KETOROLAC TROMETHAMINE 30 MG/ML
30 INJECTION, SOLUTION INTRAMUSCULAR; INTRAVENOUS EVERY 6 HOURS
Status: COMPLETED | OUTPATIENT
Start: 2018-02-23 | End: 2018-02-24

## 2018-02-23 RX ORDER — IBUPROFEN 600 MG/1
600 TABLET, FILM COATED ORAL EVERY 6 HOURS PRN
Status: DISCONTINUED | OUTPATIENT
Start: 2018-02-24 | End: 2018-02-26 | Stop reason: HOSPADM

## 2018-02-23 RX ORDER — CEFAZOLIN SODIUM 1 G/50ML
3 SOLUTION INTRAVENOUS
Status: COMPLETED | OUTPATIENT
Start: 2018-02-23 | End: 2018-02-23

## 2018-02-23 RX ORDER — AMOXICILLIN 250 MG
2 CAPSULE ORAL 2 TIMES DAILY PRN
Status: DISCONTINUED | OUTPATIENT
Start: 2018-02-23 | End: 2018-02-26 | Stop reason: HOSPADM

## 2018-02-23 RX ORDER — DIPHENHYDRAMINE HYDROCHLORIDE 50 MG/ML
INJECTION INTRAMUSCULAR; INTRAVENOUS PRN
Status: DISCONTINUED | OUTPATIENT
Start: 2018-02-23 | End: 2018-02-23

## 2018-02-23 RX ORDER — ONDANSETRON 2 MG/ML
INJECTION INTRAMUSCULAR; INTRAVENOUS PRN
Status: DISCONTINUED | OUTPATIENT
Start: 2018-02-23 | End: 2018-02-23

## 2018-02-23 RX ORDER — ACETAMINOPHEN 325 MG/1
975 TABLET ORAL EVERY 8 HOURS
Status: COMPLETED | OUTPATIENT
Start: 2018-02-23 | End: 2018-02-25

## 2018-02-23 RX ORDER — OXYCODONE HYDROCHLORIDE 5 MG/1
5-10 TABLET ORAL
Status: DISCONTINUED | OUTPATIENT
Start: 2018-02-23 | End: 2018-02-26 | Stop reason: HOSPADM

## 2018-02-23 RX ORDER — DIPHENHYDRAMINE HCL 25 MG
25 CAPSULE ORAL EVERY 6 HOURS PRN
Status: DISCONTINUED | OUTPATIENT
Start: 2018-02-23 | End: 2018-02-26 | Stop reason: HOSPADM

## 2018-02-23 RX ORDER — ONDANSETRON 2 MG/ML
4 INJECTION INTRAMUSCULAR; INTRAVENOUS EVERY 6 HOURS PRN
Status: DISCONTINUED | OUTPATIENT
Start: 2018-02-23 | End: 2018-02-26 | Stop reason: HOSPADM

## 2018-02-23 RX ORDER — MISOPROSTOL 200 UG/1
800 TABLET ORAL
Status: DISCONTINUED | OUTPATIENT
Start: 2018-02-23 | End: 2018-02-26 | Stop reason: HOSPADM

## 2018-02-23 RX ORDER — LIDOCAINE 40 MG/G
CREAM TOPICAL
Status: DISCONTINUED | OUTPATIENT
Start: 2018-02-23 | End: 2018-02-26 | Stop reason: HOSPADM

## 2018-02-23 RX ORDER — SODIUM CHLORIDE, SODIUM LACTATE, POTASSIUM CHLORIDE, CALCIUM CHLORIDE 600; 310; 30; 20 MG/100ML; MG/100ML; MG/100ML; MG/100ML
INJECTION, SOLUTION INTRAVENOUS CONTINUOUS
Status: DISCONTINUED | OUTPATIENT
Start: 2018-02-23 | End: 2018-02-23 | Stop reason: HOSPADM

## 2018-02-23 RX ORDER — NALOXONE HYDROCHLORIDE 0.4 MG/ML
.1-.4 INJECTION, SOLUTION INTRAMUSCULAR; INTRAVENOUS; SUBCUTANEOUS
Status: DISCONTINUED | OUTPATIENT
Start: 2018-02-23 | End: 2018-02-26 | Stop reason: HOSPADM

## 2018-02-23 RX ORDER — SIMETHICONE 80 MG
80 TABLET,CHEWABLE ORAL 4 TIMES DAILY PRN
Status: DISCONTINUED | OUTPATIENT
Start: 2018-02-23 | End: 2018-02-26 | Stop reason: HOSPADM

## 2018-02-23 RX ORDER — AMOXICILLIN 250 MG
1 CAPSULE ORAL 2 TIMES DAILY PRN
Status: DISCONTINUED | OUTPATIENT
Start: 2018-02-23 | End: 2018-02-26 | Stop reason: HOSPADM

## 2018-02-23 RX ADMIN — MORPHINE SULFATE 0.2 MG: 1 INJECTION EPIDURAL; INTRATHECAL; INTRAVENOUS at 07:11

## 2018-02-23 RX ADMIN — PHENYLEPHRINE HYDROCHLORIDE 100 MCG: 10 INJECTION, SOLUTION INTRAMUSCULAR; INTRAVENOUS; SUBCUTANEOUS at 07:11

## 2018-02-23 RX ADMIN — SODIUM CHLORIDE, POTASSIUM CHLORIDE, SODIUM LACTATE AND CALCIUM CHLORIDE 1000 ML: 600; 310; 30; 20 INJECTION, SOLUTION INTRAVENOUS at 05:51

## 2018-02-23 RX ADMIN — KETOROLAC TROMETHAMINE 30 MG: 30 INJECTION, SOLUTION INTRAMUSCULAR at 10:31

## 2018-02-23 RX ADMIN — SODIUM CHLORIDE, POTASSIUM CHLORIDE, SODIUM LACTATE AND CALCIUM CHLORIDE: 600; 310; 30; 20 INJECTION, SOLUTION INTRAVENOUS at 07:59

## 2018-02-23 RX ADMIN — SODIUM CITRATE AND CITRIC ACID MONOHYDRATE 30 ML: 500; 334 SOLUTION ORAL at 06:38

## 2018-02-23 RX ADMIN — SODIUM CHLORIDE, POTASSIUM CHLORIDE, SODIUM LACTATE AND CALCIUM CHLORIDE: 600; 310; 30; 20 INJECTION, SOLUTION INTRAVENOUS at 06:49

## 2018-02-23 RX ADMIN — BUPIVACAINE HYDROCHLORIDE IN DEXTROSE 13.5 MG: 7.5 INJECTION, SOLUTION SUBARACHNOID at 07:11

## 2018-02-23 RX ADMIN — DIPHENHYDRAMINE HYDROCHLORIDE 12.5 MG: 50 INJECTION, SOLUTION INTRAMUSCULAR; INTRAVENOUS at 07:50

## 2018-02-23 RX ADMIN — SODIUM CHLORIDE, SODIUM LACTATE, POTASSIUM CHLORIDE, CALCIUM CHLORIDE, AND DEXTROSE MONOHYDRATE: 600; 310; 30; 20; 5 INJECTION, SOLUTION INTRAVENOUS at 15:19

## 2018-02-23 RX ADMIN — HYDROXYZINE HYDROCHLORIDE 50 MG: 50 TABLET, FILM COATED ORAL at 22:52

## 2018-02-23 RX ADMIN — KETOROLAC TROMETHAMINE 30 MG: 30 INJECTION, SOLUTION INTRAMUSCULAR at 16:35

## 2018-02-23 RX ADMIN — KETOROLAC TROMETHAMINE 30 MG: 30 INJECTION, SOLUTION INTRAMUSCULAR at 22:52

## 2018-02-23 RX ADMIN — ACETAMINOPHEN 975 MG: 325 TABLET, FILM COATED ORAL at 10:36

## 2018-02-23 RX ADMIN — OXYTOCIN-SODIUM CHLORIDE 0.9% IV SOLN 30 UNIT/500ML 500 ML: 30-0.9/5 SOLUTION at 07:38

## 2018-02-23 RX ADMIN — PHENYLEPHRINE HYDROCHLORIDE 100 MCG: 10 INJECTION, SOLUTION INTRAMUSCULAR; INTRAVENOUS; SUBCUTANEOUS at 07:42

## 2018-02-23 RX ADMIN — HYDROXYZINE HYDROCHLORIDE 50 MG: 50 TABLET, FILM COATED ORAL at 16:35

## 2018-02-23 RX ADMIN — SODIUM CHLORIDE, POTASSIUM CHLORIDE, SODIUM LACTATE AND CALCIUM CHLORIDE: 600; 310; 30; 20 INJECTION, SOLUTION INTRAVENOUS at 06:35

## 2018-02-23 RX ADMIN — ACETAMINOPHEN 975 MG: 325 TABLET, FILM COATED ORAL at 18:35

## 2018-02-23 RX ADMIN — EPHEDRINE SULFATE 5 MG: 50 INJECTION, SOLUTION INTRAVENOUS at 07:30

## 2018-02-23 RX ADMIN — FENTANYL CITRATE 15 MCG: 50 INJECTION, SOLUTION INTRAMUSCULAR; INTRAVENOUS at 07:11

## 2018-02-23 RX ADMIN — ONDANSETRON 4 MG: 2 INJECTION INTRAMUSCULAR; INTRAVENOUS at 07:47

## 2018-02-23 RX ADMIN — EPHEDRINE SULFATE 10 MG: 50 INJECTION, SOLUTION INTRAVENOUS at 07:11

## 2018-02-23 RX ADMIN — CEFAZOLIN SODIUM 3 G: 10 INJECTION, POWDER, FOR SOLUTION INTRAVENOUS at 07:11

## 2018-02-23 RX ADMIN — PHENYLEPHRINE HYDROCHLORIDE 100 MCG: 10 INJECTION, SOLUTION INTRAMUSCULAR; INTRAVENOUS; SUBCUTANEOUS at 07:30

## 2018-02-23 RX ADMIN — EPHEDRINE SULFATE 5 MG: 50 INJECTION, SOLUTION INTRAVENOUS at 07:24

## 2018-02-23 RX ADMIN — PHENYLEPHRINE HYDROCHLORIDE 100 MCG: 10 INJECTION, SOLUTION INTRAMUSCULAR; INTRAVENOUS; SUBCUTANEOUS at 07:19

## 2018-02-23 NOTE — ANESTHESIA PREPROCEDURE EVALUATION
PAC NOTE:       ANESTHESIA PRE EVALUATION:  Anesthesia Evaluation     .             ROS/MED HX    ENT/Pulmonary:  - neg pulmonary ROS     Neurologic:       Cardiovascular:  - neg cardiovascular ROS       METS/Exercise Tolerance:     Hematologic:     (+) Anemia, -      Musculoskeletal:         GI/Hepatic:     (+) GERD Inflammatory bowel disease,       Renal/Genitourinary:         Endo:     (+) Obesity, .      Psychiatric:     (+) psychiatric history anxiety      Infectious Disease:         Malignancy:         Other:                     Physical Exam      Airway   Mallampati: III  TM distance: >3 FB  Neck ROM: full    Dental     Cardiovascular   Rhythm and rate: regular and normal      Pulmonary    breath sounds clear to auscultation             Anesthesia Plan      History & Physical Review  History and physical reviewed and following examination; no interval change.    ASA Status:  3 .    NPO Status:  > 8 hours    Plan for Spinal   PONV prophylaxis:  Ondansetron (or other 5HT-3)       Postoperative Care  Postoperative pain management:  IV analgesics, Oral pain medications, Neuraxial analgesia and Multi-modal analgesia.      Consents  Anesthetic plan, risks, benefits and alternatives discussed with:  Patient..                            .

## 2018-02-23 NOTE — ANESTHESIA PROCEDURE NOTES
Peripheral nerve/Neuraxial procedure note : intrathecal  Pre-Procedure  Performed by LATHA JAVED  Referred by QUIRINO FERRO  Location: OR    Procedure Times:2/23/2018 7:08 AM and 2/23/2018 7:11 AM  Pre-Anesthestic Checklist: patient identified, IV checked, risks and benefits discussed, informed consent, monitors and equipment checked, pre-op evaluation and at physician/surgeon's request    Timeout  Correct Patient: Yes   Correct Procedure: Yes   Correct Site: Yes   Correct Laterality: N/A   Correct Position: Yes   Site Marked: N/A   .   Procedure Documentation  ASA 3  Diagnosis:intrauterine pregnancy.    Procedure:    Intrathecal.  Insertion Site:L3-4  (midline approach)      Patient Prep;mask, sterile gloves, povidone-iodine 7.5% surgical scrub.  .  Needle: Lorena tip Spinal Needle (gauge): 25  Spinal/LP Needle Length (inches): 3.5 # of attempts: 2 and # of redirects:  Introducer used .       Assessment/Narrative  Paresthesias: No.  .  .  clear CSF fluid removed .

## 2018-02-23 NOTE — PROVIDER NOTIFICATION
02/23/18 1443   Provider Notification   Provider Name/Title Dr. RODOLFO Tubbs   Method of Notification Electronic Page   Request Evaluate-Remote   Notification Reason Medication Request   Paged regarding patient request for itching medication other than benadryl. Awaiting return call.

## 2018-02-23 NOTE — PLAN OF CARE
Problem: Patient Care Overview  Goal: Plan of Care/Patient Progress Review  Outcome: Improving  Patient doing well since transfer. Performing STS with  and breastfeeding attempts frequently. Pain well managed with bedrest and schedule pain medications so far. Reviewed room orientation,  safety, and plan for first 24 hours. She is wanting to exclusively breastfeed but anxious d/t bilateral breast reduction prior to all three pregnancies and she did not get a lot of extra support with feedings and questions. She does not want to pump if she can avoid it, so reviewed importance of hand expression and shown how to perform. She will watch video on her own. C/o itching, awaiting new orders from MD at this time.

## 2018-02-23 NOTE — IP AVS SNAPSHOT
MRN:[id number]                      After Visit Summary   2018    Kate Uribe    MRN: [id number]           Thank you!     Thank you for choosing [facility] for your care. Our goal is always to provide you with excellent care. Hearing back from our patients is one way we can continue to improve our services. Please take a few minutes to complete the written survey that you may receive in the mail after you visit. If you would like to speak to someone directly about your visit please contact Patient Relations at [phone]. Thank you!          Patient Information     Date Of Birth          1990        Designated Caregiver       Most Recent Value    Caregiver    Will someone help with your care after discharge? no      About your hospital stay     You were admitted on:  2018 You last received care in the:  [facility]partum    You were discharged on:  2018        Reason for your hospital stay       Maternity care                  Who to Call     For medical emergencies, please call 911.  For non-urgent questions about your medical care, please call your primary care provider or clinic, [phone]  For questions related to your surgery, please call your surgery clinic        Attending Provider     Provider Magdalena Miller MD OB/Gyn       Primary Care Provider Office Phone # Fax #    Bibi Gamble [phone]      After Care Instructions     Activity       Review discharge instructions            Diet       Resume previous diet            Discharge Instructions - Postpartum visit       Schedule postpartum visit with your provider and return to clinic in 2 and 6 weeks.                  Further instructions from your care team       Postop  Birth Instructions    [facility] Lactation Consultant: [phone]    Activity       Do not lift more than 10 pounds for 6 weeks after  surgery.  Ask family and friends for help when you need it.    No driving until you have stopped taking your pain medications (usually two weeks after surgery).    No heavy exercise or activity for 6 weeks.  Don't do anything that will put a strain on your surgery site.    Don't strain when using the toilet.  Your care team may prescribe a stool softener if you have problems with your bowel movements.     To care for your incision:       Keep the incision clean and dry.    Do not soak your incision in water. No swimming or hot tubs until it has fully healed. You may soak in the bathtub if the water level is below your incision.    Do not use peroxide, gel, cream, lotion, or ointment on your incision.    Adjust your clothes to avoid pressure on your surgery site (check the elastic in your underwear for example).     You may see a small amount of clear or pink drainage and this is normal.  Check with your health care provider:       If the drainage increases or has an odor.    If the incision reddens, you have swelling, or develop a rash.    If you have increased pain and the medicine we prescribed doesn't help.    If you have a fever above 100.4 F (38 C) with or without chills when placing thermometer under your tongue.   The area around your incision (surgery wound), will feel numb.  This is normal. The numbness should go away in less than a year.     Keep your hands clean:  Always wash your hands before touching your incision (surgery wound). This helps reduce your risk of infection. If your hands aren't dirty, you may use an alcohol hand-rub to clean your hands. Keep your nails clean and short.    Call your healthcare provider if you have any of these symptoms:       You soak a sanitary pad with blood within 1 hour, or you see blood clots larger than a golf ball.    Bleeding that lasts more than 6 weeks.    Vaginal discharge that smells bad.    Severe pain, cramping or tenderness in your lower belly area.    A  "need to urinate more frequently (use the toilet more often), more urgently (use the toilet very quickly), or it burns when you urinate.    Nausea and vomiting.    Redness, swelling or pain around a vein in your leg.    Problems breastfeeding or a red or painful area on your breast.    Chest pain and cough or are gasping for air.    Problems with coping with sadness, anxiety or depression. If you have concerns about hurting yourself or the baby, call your provider immediately.      You have questions or concerns after you return home.                  Pending Results     Date and Time Order Name Status Description    2/23/2018 0821 Surgical Path Exam In process             Statement of Approval     Ordered          02/26/18 0816  I have reviewed and agree with all the recommendations and orders detailed in this document.  EFFECTIVE NOW     Approved and electronically signed by:  Pavel Tubbs MD             Admission Information     Date & Time Provider Department Dept. Phone    2/23/2018 Magdalena Davalos MD [facility] Postpartum [phone]      Your Vitals Were     Blood Pressure Pulse Temperature Respirations Height Weight    116/74 90 98.3  F (36.8  C) (Oral) 16 1.626 m (5' 4\") 135.8 kg (299 lb 6.4 oz)    Last Period Pulse Oximetry BMI (Body Mass Index)             06/09/2017 96% 51.39 kg/m2         MyChart Information     [facility] gives you secure access to your electronic health record. If you see a primary care provider, you can also send messages to your care team and make appointments. If you have questions, please call your primary care clinic.  If you do not have a primary care provider, please call [phone] and they will assist you.        Care EveryWhere ID     This is your Care EveryWhere ID. This could be used by other organizations to access your [facility] medical records  [id number]        Equal Access to Services     CHRISTINE CHEN AH: izaiah Lockett, " gordon torres, rosendo amayaaan ah. So [facility] [phone].    ATENCIÓN: Si sienna perdomo, tiene a sandhu disposición servicios gratuitos de asistencia lingüística. Llame al [phone].    We comply with applicable federal civil rights laws and [place] laws. We do not discriminate on the basis of race, color, national origin, age, disability, sex, sexual orientation, or gender identity.               Review of your medicines      START taking        Dose / Directions    oxyCODONE IR 5 MG tablet   Commonly known as:  ROXICODONE        Dose:  5 mg   Take 1 tablet (5 mg) by mouth every 6 hours as needed for other (pain control or improvement in physical function. Hold dose for analgesic side effects.)   Quantity:  20 tablet   Refills:  0         CONTINUE these medicines which have NOT CHANGED        Dose / Directions    albuterol 108 (90 BASE) MCG/ACT Inhaler   Commonly known as:  PROAIR HFA/PROVENTIL HFA/VENTOLIN HFA   Used for:  Mild intermittent asthma in adult without complication        Dose:  2 puff   Inhale 2 puffs into the lungs every 6 hours   Quantity:  1 Inhaler   Refills:  11       cefdinir 300 MG capsule   Commonly known as:  OMNICEF   Used for:  Acute sinusitis with symptoms > 10 days        Dose:  300 mg   Take 1 capsule (300 mg) by mouth 2 times daily   Quantity:  20 capsule   Refills:  0       ENTOCORT EC PO        Dose:  3 mg   Take 3 mg by mouth every morning   Refills:  0       fluticasone 110 MCG/ACT Inhaler   Commonly known as:  FLOVENT HFA   Used for:  Moderate persistent asthma with exacerbation        Dose:  2 puff   Inhale 2 puffs into the lungs 2 times daily   Quantity:  1 Inhaler   Refills:  1       IMURAN PO        Dose:  50 mg   Take 50 mg by mouth 2 times daily   Refills:  0       prenatal multivitamin plus iron 27-0.8 MG Tabs per tablet        Dose:  1 tablet   Take 1 tablet by mouth daily   Refills:  0       REMICADE IV        Dose:  500 mg   Inject 500  mg into the vein Every 8 weeks   Refills:  0       sertraline 100 MG tablet   Commonly known as:  ZOLOFT   Used for:  Anxiety        Dose:  100 mg   Take 1 tablet (100 mg) by mouth daily   Quantity:  90 tablet   Refills:  1       VISTARIL PO   Indication:  rest        Dose:  50 mg   Take 50 mg by mouth once   Refills:  0            Where to get your medicines      Some of these will need a paper prescription and others can be bought over the counter. Ask your nurse if you have questions.     Bring a paper prescription for each of these medications     oxyCODONE IR 5 MG tablet                Protect others around you: Learn how to safely use, store and throw away your medicines at www.disposemymeds.org.        Information about OPIOIDS     PRESCRIPTION OPIOIDS: WHAT YOU NEED TO KNOW    Prescription opioids can be used to help relieve moderate to severe pain and are often prescribed following a surgery or injury, or for certain health conditions. These medications can be an important part of treatment but also come with serious risks. It is important to work with your health care provider to make sure you are getting the safest, most effective care.    WHAT ARE THE RISKS AND SIDE EFFECTS OF OPIOID USE?  Prescription opioids carry serious risks of addiction and overdose, especially with prolonged use. An opioid overdose, often marked by slowed breathing can cause sudden death. The use of prescription opioids can have a number of side effects as well, even when taken as directed:      Tolerance - meaning you might need to take more of a medication for the same pain relief    Physical dependence - meaning you have symptoms of withdrawal when a medication is stopped    Increased sensitivity to pain    Constipation    Nausea, vomiting, and dry mouth    Sleepiness and dizziness    Confusion    Depression    Low levels of testosterone that can result in lower sex drive, energy, and strength    Itching and sweating    RISKS  ARE GREATER WITH:    History of drug misuse, substance use disorder, or overdose    Mental health conditions (such as depression or anxiety)    Sleep apnea    Older age (65 years or older)    Pregnancy    Avoid alcohol while taking prescription opioids.   Also, unless specifically advised by your health care provider, medications to avoid include:    Benzodiazepines (such as Xanax or Valium)    Muscle relaxants (such as Soma or Flexeril)    Hypnotics (such as Ambien or Lunesta)    Other prescription opioids    KNOW YOUR OPTIONS:  Talk to your health care provider about ways to manage your pain that do not involve prescription opioids. Some of these options may actually work better and have fewer risks and side effects:    Pain relievers such as acetaminophen, ibuprofen, and naproxen    Some medications that are also used for depression or seizures    Physical therapy and exercise    Cognitive behavioral therapy, a psychological, goal-directed approach, in which patients learn how to modify physical, behavioral, and emotional triggers of pain and stress    IF YOU ARE PRESCRIBED OPIOIDS FOR PAIN:    Never take opioids in greater amounts or more often than prescribed    Follow up with your primary health care provider and work together to create a plan on how to manage your pain.    Talk about ways to help manage your pain that do not involve prescription opioids    Talk about all concerns and side effects    Help prevent misuse and abuse    Never sell or share prescription opioids    Never use another person's prescription opioids    Store prescription opioids in a secure place and out of reach of others (this may include visitors, children, friends, and family)    Visit www.cdc.gov/drugoverdose to learn about risks of opioid abuse and overdose    If you believe you may be struggling with addiction, tell your health care provider and ask for guidance or call [facility]'s National Helpline at 6-296-501-HELP    LEARN MORE /  www.cdc.gov/drugoverdose/prescribing/guideline.html    Safely dispose of unused prescription opioids: Find your local drug take-back programs and more information about the importance of safe disposal at www.doseofreality.mn.gov             Medication List: This is a list of all your medications and when to take them. Check marks below indicate your daily home schedule. Keep this list as a reference.      Medications           Morning Afternoon Evening Bedtime As Needed    albuterol 108 (90 BASE) MCG/ACT Inhaler   Commonly known as:  PROAIR HFA/PROVENTIL HFA/VENTOLIN HFA   Inhale 2 puffs into the lungs every 6 hours                                cefdinir 300 MG capsule   Commonly known as:  OMNICEF   Take 1 capsule (300 mg) by mouth 2 times daily                                ENTOCORT EC PO   Take 3 mg by mouth every morning   Last time this was given:  3 mg on 2/25/2018  9:30 PM                                fluticasone 110 MCG/ACT Inhaler   Commonly known as:  FLOVENT HFA   Inhale 2 puffs into the lungs 2 times daily                                IMURAN PO   Take 50 mg by mouth 2 times daily   Last time this was given:  100 mg on 2/25/2018  9:30 PM                                oxyCODONE IR 5 MG tablet   Commonly known as:  ROXICODONE   Take 1 tablet (5 mg) by mouth every 6 hours as needed for other (pain control or improvement in physical function. Hold dose for analgesic side effects.)   Last time this was given:  5 mg on 2/26/2018  7:57 AM                                prenatal multivitamin plus iron 27-0.8 MG Tabs per tablet   Take 1 tablet by mouth daily                                REMICADE IV   Inject 500 mg into the vein Every 8 weeks                                sertraline 100 MG tablet   Commonly known as:  ZOLOFT   Take 1 tablet (100 mg) by mouth daily   Last time this was given:  100 mg on 2/25/2018  9:30 PM                                VISTARIL PO   Take 50 mg by mouth once

## 2018-02-23 NOTE — PLAN OF CARE
Data: Kate Uribe transferred to [facility] via cart at 1030. Baby transferred via parent's arms.  Action: Receiving unit notified of transfer: Yes. Patient and family notified of room change. Report given to Rasheeda HUITRON RN at 1100. Belongings sent to receiving unit. Accompanied by Registered Nurse. Oriented patient to surroundings. Call light within reach. ID bands double-checked with receiving RN.  Response: Patient tolerated transfer and is stable.

## 2018-02-23 NOTE — BRIEF OP NOTE
[facility] Brief Operative Note    Pre-operative diagnosis: 1. Mild pre-eclampsia  2. Prior  section  3. Suspected fetal macrosomia   Post-operative diagnosis    Procedure: Procedure(s):  Repeat  section - Wound Class: II-Clean Contaminated   Surgeon(s): Surgeon(s) and Role:     * Magdalena Davalos MD - Primary   Estimated blood loss: 700 mL    Specimens:   ID Type Source Tests Collected by Time Destination   1 :  Cord blood Blood OR DOCUMENTATION ONLY Magdalena Davalos MD 2018  7:37 AM    2 :  Placenta Placenta OR DOCUMENTATION ONLY Magdalena Davalos MD 2018  7:40 AM       Findings: Liveborn male, weight 7#9oz, Apgars 7, 8.   Normal uterus, normal ovaries, left paratubal cyst.     Magdalena Davalos MD

## 2018-02-23 NOTE — IP AVS SNAPSHOT
[facility] Postpartum    [address]    [facility] [address]    Phone:  [phone]    Fax:  [phone]                                       After Visit Summary   2/23/2018    Kate Uribe    MRN: [id number]           After Visit Summary Signature Page     I have received my discharge instructions, and my questions have been answered. I have discussed any challenges I see with this plan with the nurse or doctor.    ..........................................................................................................................................  Patient/Patient Representative Signature      ..........................................................................................................................................  Patient Representative Print Name and Relationship to Patient    ..................................................               ................................................  Date                                            Time    ..........................................................................................................................................  Reviewed by Signature/Title    ...................................................              ..............................................  Date                                                            Time

## 2018-02-23 NOTE — ANESTHESIA CARE TRANSFER NOTE
Patient: Kate Uribe    Procedure(s):  Repeat  section - Wound Class: II-Clean Contaminated    Diagnosis: Previous  was large, pre-eclampsia  Diagnosis Additional Information: INTRAUTERINE PREGNANCY    Anesthesia Type:   Spinal     Note:  Airway :Room Air  Patient transferred to:Labor and Delivery  Comments: Pt comfortable, VSS, tx to mac room 1 in L/D, monitors on and report to RNHandoff Report: Identifed the Patient, Identified the Reponsible Provider, Reviewed the pertinent medical history, Discussed the surgical course, Reviewed Intra-OP anesthesia mangement and issues during anesthesia, Set expectations for post-procedure period and Allowed opportunity for questions and acknowledgement of understanding      Vitals: (Last set prior to Anesthesia Care Transfer)    CRNA VITALS  2018 0738 - 2018 0815      2018             Resp Rate (observed): (!)  1                Electronically Signed By: NEIDA Ball CRNA  2018  8:15 AM

## 2018-02-23 NOTE — PLAN OF CARE
Problem: Patient Care Overview  Goal: Plan of Care/Patient Progress Review  Outcome: Improving  Data: Vital signs within normal limits. Postpartum checks within normal limits - see flow record. Patient eating and drinking normally. No apparent signs of infection. Incision healing well. Patient performing self cares and is able to care for infant.  Action: Patient medicated during the shift for pain. See MAR. Patient reassessed within 1 hour after each medication and pain was improved - patient stated she was comfortable. Patient education done about infant safety, pain management. See flow record.  Response: Positive attachment behaviors observed with infant. Support persons  present.   Plan: Anticipate discharge on 2/26.

## 2018-02-23 NOTE — PLAN OF CARE
Data: Patient presented to [facility] at 0525.   Reason for maternal/fetal assessment per patient is Scheduled  Section  .  Patient is a . Prenatal record reviewed.      Obstetric History       T2      L2     SAB0   TAB0   Ectopic0   Multiple0   Live Births2       # Outcome Date GA Lbr [place]/2nd Weight Sex Delivery Anes PTL Lv   3 Current            2 Term 14 39w0d  4.451 kg (9 lb 13 oz) F CS-LTranv  N FAHAD      Name: Lissy   1 Term 12/15/09 42w0d  3.771 kg (8 lb 5 oz) F Vag-Spont EPI N FAHAD      Name: Jo      . Medical history:   Past Medical History:   Diagnosis Date     Anemia      Asthma      Crohn disease (H)      Heart murmur      Mental disorder     anxiety     Noninfectious ileitis     Crohn's disease     Preeclampsia    . Gestational Age 37w0d. VSS. Fetal movement present. Patient denies cramping, backache, vaginal discharge, pelvic pressure, UTI symptoms, GI problems, bloody show, vaginal bleeding, edema, headache, visual disturbances, epigastric or URQ pain, abdominal pain, rupture of membranes. Support persons  Lawrence present.  Action: Verbal consent for EFM. Triage assessment completed. EFM applied for fetal assessment. Uterine assessment no contractions. Fetal assessment: Presumed adequate fetal oxygenation documented (see flow record).   Response: Dr. Davalos will be present for scheduled surgery this am. Patient verbalized agreement with plan.  Oriented to call light.

## 2018-02-23 NOTE — ANESTHESIA POSTPROCEDURE EVALUATION
Patient: Kate Uribe    Procedure(s):  Repeat  section - Wound Class: II-Clean Contaminated    Diagnosis:Previous  was large, pre-eclampsia  Diagnosis Additional Information: INTRAUTERINE PREGNANCY    Anesthesia Type:  Spinal    Note:  Anesthesia Post Evaluation    Patient location during evaluation: OB PACU  Patient participation: Able to fully participate in evaluation  Level of consciousness: awake  Pain management: adequate  Airway patency: patent  Cardiovascular status: acceptable  Respiratory status: acceptable  Hydration status: acceptable  PONV: controlled     Anesthetic complications: None    Comments: Anticipate full return of neurologic function        Last vitals:  Vitals:    18 0846 18 0848 18 0849   BP:   131/60   Resp:      Temp:      SpO2: 95% 93%          Electronically Signed By: Joshua Galdamez MD  2018  8:58 AM

## 2018-02-24 LAB — HGB BLD-MCNC: 7.4 G/DL (ref 11.7–15.7)

## 2018-02-24 PROCEDURE — 25000128 H RX IP 250 OP 636: Performed by: OBSTETRICS & GYNECOLOGY

## 2018-02-24 PROCEDURE — 40000083 ZZH STATISTIC IP LACTATION SERVICES 1-15 MIN

## 2018-02-24 PROCEDURE — 36415 COLL VENOUS BLD VENIPUNCTURE: CPT | Performed by: OBSTETRICS & GYNECOLOGY

## 2018-02-24 PROCEDURE — 12000027 ZZH R&B OB

## 2018-02-24 PROCEDURE — 25000132 ZZH RX MED GY IP 250 OP 250 PS 637: Performed by: OBSTETRICS & GYNECOLOGY

## 2018-02-24 PROCEDURE — 85018 HEMOGLOBIN: CPT | Performed by: OBSTETRICS & GYNECOLOGY

## 2018-02-24 PROCEDURE — 40000085 ZZH STATISTIC IP LACTATION SERVICES 31-45 MIN

## 2018-02-24 RX ORDER — SERTRALINE HYDROCHLORIDE 100 MG/1
100 TABLET, FILM COATED ORAL DAILY
Status: DISCONTINUED | OUTPATIENT
Start: 2018-02-24 | End: 2018-02-26 | Stop reason: HOSPADM

## 2018-02-24 RX ORDER — DIPHENHYDRAMINE HYDROCHLORIDE 50 MG/ML
50 INJECTION INTRAMUSCULAR; INTRAVENOUS
Status: DISCONTINUED | OUTPATIENT
Start: 2018-02-24 | End: 2018-02-26 | Stop reason: CLARIF

## 2018-02-24 RX ORDER — METHYLPREDNISOLONE SODIUM SUCCINATE 125 MG/2ML
125 INJECTION, POWDER, LYOPHILIZED, FOR SOLUTION INTRAMUSCULAR; INTRAVENOUS
Status: DISCONTINUED | OUTPATIENT
Start: 2018-02-24 | End: 2018-02-26 | Stop reason: CLARIF

## 2018-02-24 RX ADMIN — IBUPROFEN 800 MG: 800 TABLET ORAL at 10:28

## 2018-02-24 RX ADMIN — OXYCODONE HYDROCHLORIDE 5 MG: 5 TABLET ORAL at 15:17

## 2018-02-24 RX ADMIN — OXYCODONE HYDROCHLORIDE 5 MG: 5 TABLET ORAL at 11:57

## 2018-02-24 RX ADMIN — ACETAMINOPHEN 975 MG: 325 TABLET, FILM COATED ORAL at 10:28

## 2018-02-24 RX ADMIN — ACETAMINOPHEN 975 MG: 325 TABLET, FILM COATED ORAL at 18:30

## 2018-02-24 RX ADMIN — IRON SUCROSE 200 MG: 20 INJECTION, SOLUTION INTRAVENOUS at 12:39

## 2018-02-24 RX ADMIN — ACETAMINOPHEN 975 MG: 325 TABLET, FILM COATED ORAL at 02:14

## 2018-02-24 RX ADMIN — KETOROLAC TROMETHAMINE 30 MG: 30 INJECTION, SOLUTION INTRAMUSCULAR at 04:33

## 2018-02-24 RX ADMIN — OXYCODONE HYDROCHLORIDE 5 MG: 5 TABLET ORAL at 19:11

## 2018-02-24 RX ADMIN — SERTRALINE HYDROCHLORIDE 100 MG: 100 TABLET ORAL at 21:00

## 2018-02-24 NOTE — PROGRESS NOTES
[facility]   Obstetrics Post-Op / Progress Note         Interval History:   Doing well.  Pain is well-controlled.  Ambulatory.  Breastfeeding well. She has Crohn's disease, has been advised to avoid NSAIDs. Has been using toradol. She also is taking tylenol, wishes to avoid oxycodone because she does not like how it makes her feel and also makes her constipated. She has tried oral iron in the past and has not tolerated it well due to Crohns.             Physical Exam:   All vitals stable  Temp: 97.8  F (36.6  C) Temp src: Oral BP: 107/60 Pulse: 87 Heart Rate: 84 Resp: 20 SpO2: 96 %        EXAM:  Constitutional: healthy, alert, no distress.   Abdomen: Abdomen soft, non-tender. BS normal. No masses, fundus is firm.  Incision: Clean, dry and intact, no erythema or induration.            Data:   All laboratory data related to this surgery reviewed  Lab Results   Component Value Date    HGB 7.4 (L) 2018            Assessment and Plan:    Assessment:   Post-operative day #1  Low transverse repeat  section         Clean wound without signs of infection.      Plan:   Ambulation encouraged  Discussed use of NSAIDs vs. Oxycodone- I would recommend tylenol and oxycodone. She very much prefers to avoid narcotics, so would rather try tylenol and use Ibuprofen as needed. In the past, she has not had flares precipitated by NSAIDS and feels it would be fine.  Acute blood loss anemia superimposed on chronic anemia. She does not tolerate oral iron, will start IV Venofer x 2 doses, repeat hgb in AM.  Resume zoloft.    Amanda Tubbs

## 2018-02-24 NOTE — PLAN OF CARE
Problem: Patient Care Overview  Goal: Plan of Care/Patient Progress Review  Outcome: Improving  Pt able to get some rest between cares w/  rooming-in for night.  States ordered pain medications working well to keep her comfortable.  Has voided X 1 since catheter was d/c'ed.  BPs WDL.  Ambulating in room w/o difficulty.  BS x 4 quads audible and active; tolerating regular diet.  SL intact.  Small amt moist serosanguinous present under tegaderm dressing.  FOB present and supportive.  Plan to continue to monitor.

## 2018-02-24 NOTE — LACTATION NOTE
Lactation visit. Nursing well on left breast. Struggling on right. Pediatrician recommended using nipple shield on right since she used it previously and is having trouble.  had just finished feeding. Mother will call for assistance with latching on next feed.

## 2018-02-24 NOTE — PLAN OF CARE
Problem: Patient Care Overview  Goal: Plan of Care/Patient Progress Review  Data: Vital signs within normal limits. Postpartum checks within normal limits - see flow record. Patient eating and drinking normally. Patient able to empty bladder independently and is up ambulating. No apparent signs of infection. Incision healing well. Patient performing self cares and is able to care for infant. Patient's Hgb this am was 7.4, started on Venofer x 2 doses, first dose received at 1230. IV appeared to be infiltrated after infusion thus IV was discontinued. Mild tenderness noted at IV site. Patient aware new IV will need to be started for 2nd dose of Venofer. Patient ambulated in hallway without assistance. Passing flatus and able to have bowel movement this morning.  Action: Patient medicated during the shift for incisional pain and cramping when infant feeding. Patient taking Ibuprofen and Oxycodone, See MAR. Patient reassessed within 1 hour after each medication and pain was improved - patient stated she was comfortable. Patient education done about use of nipple sheet, lactation nurse Rasheeda has been in to work with patient. See flow record.  Response: Positive attachment behaviors observed with infant. Support persons are present.   Plan: Anticipate discharge on 2/26/18.

## 2018-02-24 NOTE — LACTATION NOTE
Lactation follow up for latching assistance. Patient in chair in room, propped with pillows to attempt latching  on right breast in FB hold with nipple shield. Proper nipple shield use and cares reviewed and assisted  to STS. Latched very easily with shield over easily hand expressed colostrum. Breast compression performed by mother and  sucked with a few swallows noted. Needing encouragement and stimulation to continue nursing, but had a fair feed of about 12 minutes off and on before falling asleep despite rigorous stimulation by mother and writer. Mother wanting to get back in bed so  [facility] and woke up rooting immediately so placed back STS with mother and latched with slight assistance on left breast in cross cradle hold. Mother states some pinching and soreness on this nipple at the upper ridge of nipple. Noted in latching position some pulling on skin tissue with sucks above 's upper lip. Pointed out to mother and re-latched more deeply and pulling absent. Mother will hand express following feedings. Encouraged her to call for any further assistance or questions.

## 2018-02-24 NOTE — PLAN OF CARE
Problem: Patient Care Overview  Goal: Plan of Care/Patient Progress Review  Outcome: Improving  Assumed care of patient at 1700. VSS. Breastfeeding  independently. Tolerating regular diet. Incisional dressing in place with moist drainage present, no signs of infection noted. Miller catheter in place draining adequate amounts of urine. Patient requested miller catheter removal; assisted to bathroom, anthony cares provided and miller catheter removed at 21:30. Patient aware that she is now due to void. IV Saline locked as patient is tolerating fluids well. Tolerated ambulation well. Incisional pain controlled with use of Toradol and Tylenol. Bonding well with . Spouse present and assistive with cares.

## 2018-02-25 LAB — HGB BLD-MCNC: 7.6 G/DL (ref 11.7–15.7)

## 2018-02-25 PROCEDURE — 25000132 ZZH RX MED GY IP 250 OP 250 PS 637: Performed by: OBSTETRICS & GYNECOLOGY

## 2018-02-25 PROCEDURE — 36415 COLL VENOUS BLD VENIPUNCTURE: CPT | Performed by: OBSTETRICS & GYNECOLOGY

## 2018-02-25 PROCEDURE — 85018 HEMOGLOBIN: CPT | Performed by: OBSTETRICS & GYNECOLOGY

## 2018-02-25 PROCEDURE — 40000083 ZZH STATISTIC IP LACTATION SERVICES 1-15 MIN

## 2018-02-25 PROCEDURE — 25000128 H RX IP 250 OP 636: Performed by: OBSTETRICS & GYNECOLOGY

## 2018-02-25 PROCEDURE — 40000086 ZZH STATISTIC IP LACTATION SERVICES 46-60 MIN

## 2018-02-25 PROCEDURE — 12000027 ZZH R&B OB

## 2018-02-25 PROCEDURE — 25000131 ZZH RX MED GY IP 250 OP 636 PS 637: Performed by: OBSTETRICS & GYNECOLOGY

## 2018-02-25 RX ORDER — AZATHIOPRINE 50 MG/1
100 TABLET ORAL DAILY
Status: DISCONTINUED | OUTPATIENT
Start: 2018-02-25 | End: 2018-02-26 | Stop reason: HOSPADM

## 2018-02-25 RX ORDER — BUDESONIDE 3 MG/1
3 CAPSULE, COATED PELLETS ORAL EVERY MORNING
Status: DISCONTINUED | OUTPATIENT
Start: 2018-02-25 | End: 2018-02-26 | Stop reason: HOSPADM

## 2018-02-25 RX ADMIN — OXYCODONE HYDROCHLORIDE 5 MG: 5 TABLET ORAL at 18:18

## 2018-02-25 RX ADMIN — ACETAMINOPHEN 975 MG: 325 TABLET, FILM COATED ORAL at 18:15

## 2018-02-25 RX ADMIN — OXYCODONE HYDROCHLORIDE 5 MG: 5 TABLET ORAL at 02:02

## 2018-02-25 RX ADMIN — IRON SUCROSE 200 MG: 20 INJECTION, SOLUTION INTRAVENOUS at 11:11

## 2018-02-25 RX ADMIN — IBUPROFEN 800 MG: 800 TABLET ORAL at 21:30

## 2018-02-25 RX ADMIN — BUDESONIDE 3 MG: 3 CAPSULE ORAL at 21:30

## 2018-02-25 RX ADMIN — ACETAMINOPHEN 975 MG: 325 TABLET, FILM COATED ORAL at 22:10

## 2018-02-25 RX ADMIN — ACETAMINOPHEN 975 MG: 325 TABLET, FILM COATED ORAL at 10:19

## 2018-02-25 RX ADMIN — OXYCODONE HYDROCHLORIDE 5 MG: 5 TABLET ORAL at 06:54

## 2018-02-25 RX ADMIN — OXYCODONE HYDROCHLORIDE 5 MG: 5 TABLET ORAL at 14:12

## 2018-02-25 RX ADMIN — SERTRALINE HYDROCHLORIDE 100 MG: 100 TABLET ORAL at 21:30

## 2018-02-25 RX ADMIN — IBUPROFEN 800 MG: 800 TABLET ORAL at 06:54

## 2018-02-25 RX ADMIN — AZATHIOPRINE 100 MG: 50 TABLET ORAL at 21:30

## 2018-02-25 RX ADMIN — ACETAMINOPHEN 975 MG: 325 TABLET, FILM COATED ORAL at 02:18

## 2018-02-25 RX ADMIN — IBUPROFEN 800 MG: 800 TABLET ORAL at 14:12

## 2018-02-25 RX ADMIN — OXYCODONE HYDROCHLORIDE 5 MG: 5 TABLET ORAL at 10:19

## 2018-02-25 NOTE — PLAN OF CARE
Problem: Postpartum ( Delivery) (Adult,Obstetrics,Pediatric)  Goal: Signs and Symptoms of Listed Potential Problems Will be Absent, Minimized or Managed (Postpartum)  Signs and symptoms of listed potential problems will be absent, minimized or managed by discharge/transition of care (reference Postpartum ( Delivery) (Adult,Obstetrics,Pediatric) CPG).   Outcome: Therapy, progress toward functional goals as expected  hgb 7.6 got iv iron today.

## 2018-02-25 NOTE — OP NOTE
Procedure Date: 2018      DATE OF PROCEDURE: 2018      PREOPERATIVE DIAGNOSES:   1.  A 27-year-old G3, P2-0-0-2 at 37 and 0 weeks' gestation with mild preeclampsia.   2.  Prior  section.   3.  History of macrosomic infant.      POSTOPERATIVE DIAGNOSES:   1.  A 27-year-old G3, P2-0-0-2 at 37 and 0 weeks' gestation with mild preeclampsia.   2.  Normal uterus, right tube and ovary.  Left paratubal cyst and normal left ovary.       PROCEDURE:  Repeat  section with removal of left paratubal cyst.      SURGEON:  Magdalena Davalos MD      ANESTHESIA:  Spinal.      INDICATIONS:  This is a 27-year-old G3, P2 who was admitted for scheduled repeat  section at 37 and 0 weeks' gestation due to the development of mild preeclampsia at 35 weeks.  She has been monitored closely in clinic with twice weekly biophysical profiles as well as twice weekly blood pressure checks and weekly labs.  Her labs have remained normal and she has had no symptoms of severe preeclampsia.  She has a history of a prior  section and was scheduled for repeat.  Additionally, she had a history of left-sided pelvic pain, with a 3-4 cm simple cyst noted on ultrasound.      DESCRIPTION OF PROCEDURE:  The patient was taken to the operating room where her spinal anesthesia was found to be adequate.  She was prepped and draped in the normal sterile fashion in the dorsal supine position with a leftward tilt.  A Pfannenstiel skin incision was made with a scalpel and carried through to the underlying layer of fascia.  The fascia was then scored in the midline and the fascial incision was extended laterally with Thomas scissors.  The superior aspect of the fascial incision was grasped with Kocher clamps, elevated, and the rectus muscles were dissected off both sharply and bluntly.  The same procedure was undertaken on the inferior aspect of the incision.  The peritoneal cavity was identified and entered bluntly.  This  incision was extended bluntly.  A bladder flap was created sharply with Metzenbaum scissors as well as bluntly and a bladder blade was placed.  The hysterotomy was performed in a low transverse fashion with a scalpel.  This incision was extended both bluntly and sharply with bandage scissors.  Membranes were ruptured, noting copious amounts of clear fluid.  At this point, the infant was delivered in cephalic presentation without complication.  Delayed cord clamping was performed for a minute.  The infant was then handed off to the nurses in the room.  Cord blood was collected.  The placenta was delivered with vigorous uterine massage.  The uterus was then exteriorized and cleared of all clots and debris.  The hysterotomy was repaired with 0 Vicryl in a running locked fashion, followed by a second layer of horizontal imbrication.  Two additional figure-of-eight sutures were placed for complete hemostasis.  There was a 4 to 5 cm left paratubal cyst noted.  This was removed with Bovie cautery and sent for pathology.  No other abnormalities were noted of the pelvic structures.  The uterus was then returned to the abdomen.  The abdomen was irrigated and dried, and Marge hemostatic agent was placed along the hysterotomy.  The fascia was then closed with 0 Vicryl in a running suture.  Marge hemostatic agent was used along the rectus muscles.  The subcutaneous tissue was then irrigated and dried and this layer was closed with 0 Vicryl in a running suture.  The skin was then closed with Insorb absorbable staples.  A Tegaderm bandage was placed over the incision.  Sponge, lap and needle counts were correct x2 at this point of the procedure and there were no complications.      FINDINGS  OF THE DELIVERY:  Liveborn infant male, Apgars of 7 and 8, weight of 7 pounds 9 ounces, normal uterus, right fallopian tube and ovary, normal left ovary with 4 to 5 cm left paratubal cyst.            ESTIMATED BLOOD LOSS:  700 mL.          JOSY WIN MD             D: 2018   T: 2018   MT: MD      Name:     IGNACIO MONROY   MRN:      [id number]        Account:        [id number]   :      1990           Procedure Date: 2018      Document: [id number]

## 2018-02-25 NOTE — PLAN OF CARE
Problem: Patient Care Overview  Goal: Plan of Care/Patient Progress Review  Outcome: Improving  Patient stable throughout shift.  Taking oxycodone, tylenol, and ibuprofen for pain/comfort with some relief.  Breastfeeding well with baby doing better in the football hold.  Pumping after each feed and finger feeding the pumped amount to baby due to the 10.6% weight loss.  Up voiding independently without difficulty.  Bonding well with baby.  Providing cares for self and baby independently.  FOB present in room all night, supportive and helpful with cares.  Wants to talk to the  In the morning to see about restarting on her Crohn's medication.

## 2018-02-25 NOTE — LACTATION NOTE
Lactation visit.  at 12.5% weight loss this morning, elevated bilirubin, jaundiced skin, and sleepy. Mother states that  has been feeding frequently overnight, several wet and BM diapers in the past 24 hours. Stools less sticky and more green in color. Still having difficulty latching  on right breast, using shield when needed for some latches. Left nipple upper left quadrant is bruised and tender most likely due to shallow latching. Mother has been using lanolin cream. Gave hydrogel pads and education provided on use without lanolin. Assisted in latching in FB hold on left breast. Mother able to tell if latch is shallow and re-latch for a good, comfortable latch.  then maintained latch for about 15 minutes, sucking and swallowing with some stimulation before he fell asleep. Education provided on donor breast milk and/or formula supplementation per MD recommendation for weight loss/jaundice. Parents gave consent for HDM supplementation and signed form. After attempting to switch  to left breast in FB hold with shield as well as laid back position without shield and he was too sleepy to latch. Mother's breasts are buckley than on POD 0 and right breast looked and felt softer after  nursed. Assisted mother with pump set up and education provided on proper cleaning and sterilization of parts. Changed flange size to 27mm, which is slightly larger than she may need on right breast, but left nipple is not everting as easily and mother c/o discomfort with 24 mm flanges on left. Encouraged her to try both sizes and use which one is most effective for her at removing milk and comfortable with nipple flowing freely in and out of flange. Only about a mL expressed between the 2 sides. Encouraged her to massage and do HE after pumping as well. Education provided on alternative supplementation methods to bottle feeding, but parents choosing to use bottle because they will be using at home  if supplementation continues to be needed. Parents shown how to supplement via paced bottle feeding. Craig sleepy at first but able to get a good rhythm of sucking and swallowing with short breaks. Took the full 30 mL supplementation. Floor nurse updated as well as MD when rounding on plan for day regarding breastfeeding and supplementing.

## 2018-02-25 NOTE — PLAN OF CARE
Problem: Patient Care Overview  Goal: Plan of Care/Patient Progress Review  Outcome: Improving  Doing well with self and infant cares, breast feeding independently. Stated had episode of diarrhea earlier in shift, none since, but requests her Crohns medications be restarted. Tolerating regular diet, ambulating in halls, denies difficulty voiding. Scheduled tylenol, oxycodone, and ibuprofen for pain. Instructed in pumping as baby at 10%+ weight loss, supplementing with EBM. FOB present and supportive, involved in infant cares.

## 2018-02-25 NOTE — PLAN OF CARE
Problem: Patient Care Overview  Goal: Plan of Care/Patient Progress Review  Outcome: Therapy, progress toward functional goals as expected  Vss, fundal checks wnl, iv iron done for 7.6 hgb vitals wnl, taking tyl, ibu and oxy for pain, incision approx and covered with tegaderm, breastfeeding, pumping and suppl. Infant, father helpful and both attentive to infant needs and cares, continue to monitor.

## 2018-02-25 NOTE — PROGRESS NOTES
"POD2  Staying overnight because baby lost weight. Feels well, pain controlled. Wishes to resume Crohn's meds, both are safe with lactation according to [employer].    BP (!) 131/97  Pulse 91  Temp 98.6  F (37  C) (Oral)  Resp 20  Ht 1.626 m (5' 4\")  Wt 135.8 kg (299 lb 6.4 oz)  LMP 06/09/2017  SpO2 96%  Breastfeeding? Unknown  BMI 51.39 kg/m2   NAD  Abd Soft, ND  Inc: CDI    Hemoglobin   Date Value Ref Range Status   02/25/2018 7.6 (L) 11.7 - 15.7 g/dL Final       POD2 s/ RLTCS, mild pre-eclampsia  Routine care  Resume Crohn's meds, f/u with GI as outpatient  Hgb stable, receiving 2nd dose of Venofer today.    Amanda Tubbs   "

## 2018-02-26 VITALS
OXYGEN SATURATION: 96 % | RESPIRATION RATE: 18 BRPM | WEIGHT: 293 LBS | DIASTOLIC BLOOD PRESSURE: 63 MMHG | HEIGHT: 64 IN | HEART RATE: 90 BPM | BODY MASS INDEX: 50.02 KG/M2 | TEMPERATURE: 98.3 F | SYSTOLIC BLOOD PRESSURE: 128 MMHG

## 2018-02-26 LAB
COPATH REPORT: NORMAL
HGB BLD-MCNC: 10.8 G/DL (ref 11.7–15.7)
T PALLIDUM IGG+IGM SER QL: NEGATIVE

## 2018-02-26 PROCEDURE — 25000132 ZZH RX MED GY IP 250 OP 250 PS 637: Performed by: OBSTETRICS & GYNECOLOGY

## 2018-02-26 RX ORDER — OXYCODONE HYDROCHLORIDE 5 MG/1
5 TABLET ORAL EVERY 6 HOURS PRN
Qty: 20 TABLET | Refills: 0 | Status: SHIPPED | OUTPATIENT
Start: 2018-02-26 | End: 2018-03-08

## 2018-02-26 RX ORDER — ACETAMINOPHEN 325 MG/1
650 TABLET ORAL EVERY 4 HOURS PRN
Status: DISCONTINUED | OUTPATIENT
Start: 2018-02-26 | End: 2018-02-26 | Stop reason: HOSPADM

## 2018-02-26 RX ADMIN — IBUPROFEN 800 MG: 800 TABLET ORAL at 07:57

## 2018-02-26 RX ADMIN — OXYCODONE HYDROCHLORIDE 5 MG: 5 TABLET ORAL at 03:31

## 2018-02-26 RX ADMIN — ACETAMINOPHEN 650 MG: 325 TABLET, FILM COATED ORAL at 10:24

## 2018-02-26 RX ADMIN — OXYCODONE HYDROCHLORIDE 5 MG: 5 TABLET ORAL at 07:57

## 2018-02-26 NOTE — PLAN OF CARE
Problem: Patient Care Overview  Goal: Plan of Care/Patient Progress Review  Outcome: Improving  Able to get periods of rest during the night w/  rooming-in for night.  States ordered pain medications working well to keep her comfortable.  Ambulating w/o issues; denies feeling lightheaded or dizzy.  Independent w/ self and baby cares.  FOB present and supportive.  Anticipate d/c later today.

## 2018-02-26 NOTE — PLAN OF CARE
Discharge instructions completed.  Patient states she understands all discharge instructions and all her questions have been answered.  Verbalizes when she needs to return to clinic for follow up for herself and baby.  She is caring for herself and her baby independently.  Prescriptions reviewed and sent to pharmacy.  Postpartum depression symptoms reviewed and encouraged frequent review of depression scale. Discharged to home with spouse and infant today at 1030.

## 2018-02-26 NOTE — PLAN OF CARE
Problem: Postpartum ( Delivery) (Adult,Obstetrics,Pediatric)  Goal: Signs and Symptoms of Listed Potential Problems Will be Absent, Minimized or Managed (Postpartum)  Signs and symptoms of listed potential problems will be absent, minimized or managed by discharge/transition of care (reference Postpartum ( Delivery) (Adult,Obstetrics,Pediatric) CPG).   Outcome: Improving  Doing well, breast feeding with minimal assist, using shield.  Tolerating regular diet, ambulating in halls, denies difficulty voiding. Scheduled tylenol and ibuprofen for pain with stated relief. Pumping and supplementing with EBM and donor milk. FOB present and supportive, and involved in infant cares.

## 2018-02-26 NOTE — DISCHARGE INSTRUCTIONS
Postop  Birth Instructions    [facility] Lactation Consultant: [phone]    Activity       Do not lift more than 10 pounds for 6 weeks after surgery.  Ask family and friends for help when you need it.    No driving until you have stopped taking your pain medications (usually two weeks after surgery).    No heavy exercise or activity for 6 weeks.  Don't do anything that will put a strain on your surgery site.    Don't strain when using the toilet.  Your care team may prescribe a stool softener if you have problems with your bowel movements.     To care for your incision:       Keep the incision clean and dry.    Do not soak your incision in water. No swimming or hot tubs until it has fully healed. You may soak in the bathtub if the water level is below your incision.    Do not use peroxide, gel, cream, lotion, or ointment on your incision.    Adjust your clothes to avoid pressure on your surgery site (check the elastic in your underwear for example).     You may see a small amount of clear or pink drainage and this is normal.  Check with your health care provider:       If the drainage increases or has an odor.    If the incision reddens, you have swelling, or develop a rash.    If you have increased pain and the medicine we prescribed doesn't help.    If you have a fever above 100.4 F (38 C) with or without chills when placing thermometer under your tongue.   The area around your incision (surgery wound), will feel numb.  This is normal. The numbness should go away in less than a year.     Keep your hands clean:  Always wash your hands before touching your incision (surgery wound). This helps reduce your risk of infection. If your hands aren't dirty, you may use an alcohol hand-rub to clean your hands. Keep your nails clean and short.    Call your healthcare provider if you have any of these symptoms:       You soak a sanitary pad with blood within 1 hour, or you see blood clots larger than a golf  ball.    Bleeding that lasts more than 6 weeks.    Vaginal discharge that smells bad.    Severe pain, cramping or tenderness in your lower belly area.    A need to urinate more frequently (use the toilet more often), more urgently (use the toilet very quickly), or it burns when you urinate.    Nausea and vomiting.    Redness, swelling or pain around a vein in your leg.    Problems breastfeeding or a red or painful area on your breast.    Chest pain and cough or are gasping for air.    Problems with coping with sadness, anxiety or depression. If you have concerns about hurting yourself or the baby, call your provider immediately.      You have questions or concerns after you return home.

## 2018-02-28 NOTE — DISCHARGE SUMMARY
"Post-Operative Note and Discharge Summary    Kate Uribe MRN# [id number]   YOB: 1990 Age: 27 year old     Date of Admission:  2018  Date of Discharge:  2018 10:39 AM  Admitting Physician:  Magdalena Davalos MD  Discharge Physician:  Magdalena Davalos MD  Discharging Service:  Obstetrics and Gynecology     [facility] clinic: [facility]         Admission Diagnoses:   Previous  was large, pre-eclampsia   delivery delivered          Discharge Diagnosis:   Patient Active Problem List   Diagnosis     Obesity, morbid, BMI 40.0-49.9 (H)     Crohn's disease of small intestine without complication (H)     Mild intermittent asthma in adult without complication     Iron deficiency anemia, unspecified iron deficiency anemia type     Anxiety     Gastroesophageal reflux disease without esophagitis     Indication for care in labor or delivery      delivery delivered                Discharge Disposition:   Discharged to home           Condition on Discharge:   Discharge vitals: Blood pressure 128/63, pulse 90, temperature 98.3  F (36.8  C), temperature source Oral, resp. rate 18, height 1.626 m (5' 4\"), weight 135.8 kg (299 lb 6.4 oz), last menstrual period 2017, SpO2 96 %, unknown if currently breastfeeding.           Procedures / Labs / Imaging:   Invasive procedures: Repeat  section          Medications Prior to Admission:     No prescriptions prior to admission.             Discharge Medications:     Discharge Medication List as of 2018  8:58 AM      START taking these medications    Details   oxyCODONE IR (ROXICODONE) 5 MG tablet Take 1 tablet (5 mg) by mouth every 6 hours as needed for other (pain control or improvement in physical function. Hold dose for analgesic side effects.), Disp-20 tablet, R-0, Local Print         CONTINUE these medications which have NOT CHANGED    Details   HydrOXYzine Pamoate (VISTARIL PO) Take 50 mg by mouth " once, Historical      AzaTHIOprine (IMURAN PO) Take 50 mg by mouth 2 times daily , Historical      Budesonide (ENTOCORT EC PO) Take 3 mg by mouth every morning , Historical      cefdinir (OMNICEF) 300 MG capsule Take 1 capsule (300 mg) by mouth 2 times daily, Disp-20 capsule, R-0, E-Prescribe      fluticasone (FLOVENT HFA) 110 MCG/ACT Inhaler Inhale 2 puffs into the lungs 2 times daily, Disp-1 Inhaler, R-1, E-Prescribe      albuterol (PROAIR HFA/PROVENTIL HFA/VENTOLIN HFA) 108 (90 BASE) MCG/ACT Inhaler Inhale 2 puffs into the lungs every 6 hours, Disp-1 Inhaler, R-11, E-Prescribe      sertraline (ZOLOFT) 100 MG tablet Take 1 tablet (100 mg) by mouth daily, Disp-90 tablet, R-1, E-Prescribe      Prenatal Vit-Fe Fumarate-FA (PRENATAL MULTIVITAMIN PLUS IRON) 27-0.8 MG TABS per tablet Take 1 tablet by mouth daily, Historical      InFLIXimab (REMICADE IV) Inject 500 mg into the vein Every 8 weeks, Historical                   Brief History of Illness:   Kate Uribe is a 27 year old female who was admitted for scheduled repeat  section at 37 weeks gestation due to mild pre-eclampsia and prior  section.           Hospital Course:   Patient underwent  delivery on 18 without complication.   Her postoperative course was uncomplicated, with the ability to ambulate, void and tolerate regular diet by postoperative day #1. She was able to pass flatus and had pain controlled with oral medications by postoperative day #2. She was discharged on postoperative day #3 meeting all milestones. She had chronic anemia, with acute blood loss anemia from surgery. Her hemoglobin was stable prior to discharge and she received IV iron replacement. She had no orthostatic symptoms.           Significant Results:       Recent Labs  Lab 18  0635 18  0718 18  0545   HGB 7.6* 7.4* 10.0*            Discharge Instructions and Follow-Up:   Discharge diet: Regular   Discharge activity: No intercourse or  vigorous exercise for 6 weeks   Discharge follow-up: Follow up with primary OB provider in 6 weeks         Magdalena Davalos MD

## 2018-03-01 ENCOUNTER — TELEPHONE (OUTPATIENT)
Dept: OBGYN | Facility: CLINIC | Age: 28
End: 2018-03-01

## 2018-03-08 ENCOUNTER — OFFICE VISIT (OUTPATIENT)
Dept: PEDIATRICS | Facility: CLINIC | Age: 28
End: 2018-03-08
Payer: COMMERCIAL

## 2018-03-08 VITALS
DIASTOLIC BLOOD PRESSURE: 72 MMHG | SYSTOLIC BLOOD PRESSURE: 125 MMHG | TEMPERATURE: 96.8 F | BODY MASS INDEX: 48.75 KG/M2 | HEART RATE: 116 BPM | WEIGHT: 284 LBS

## 2018-03-08 DIAGNOSIS — O92.79 LACTATION DISORDER, DELIVERED: ICD-10-CM

## 2018-03-08 DIAGNOSIS — Z98.890 HISTORY OF BILATERAL BREAST REDUCTION SURGERY: ICD-10-CM

## 2018-03-08 PROCEDURE — 99214 OFFICE O/P EST MOD 30 MIN: CPT | Performed by: NURSE PRACTITIONER

## 2018-03-08 NOTE — PROGRESS NOTES
SUBJECTIVE    Kate Uribe, a 28 year old female is here with baby for breastfeeding consultation as requested by Dr. Loaiza and weight check. Baby name: Tutu Uribe, Baby age: 1 wk    Mom presents to clinic with baby today with the chief complaint of milk supply issues. She reports she is directly feeding the baby Q 2.5-3 hrs for approximately 20-40 minutes and she is pumping her breasts about after every feed, getting 10 mls.  Patient is supplementing the direct breast feeds with 2-3 oz of formula.     Mom reports the following nipple complaints: none  She has not had breast engorgement problems.  She denies engorgement symptoms of her axilla.  She denies a history of endocrine problems such as hypothyroidism, diabetes, gestational diabetes.  She reports significant changes to breast size during pregnancy. Hx significant for br reduction. This is her 3rde child, did not have successful breastfeeding of her previous kids.     The baby was not dealing with hyperbilirubinemia, growth problems, cleft palate or any other oropharynx problems, hypoglycemia.     ROS:  7-Point Review of Systems Negative-- Except as stated above.    OBJECTIVE  /72  Pulse 116  Temp 96.8  F (36  C) (Tympanic)  Wt 284 lb (128.8 kg)  LMP 2017  BMI 48.75 kg/m2  A latch was observed today.    Latch:  2 - Good Latch  Audible Swallowin - Spontaneous & frequent  Type of Nipple:  2 - Everted  Comfort+: 2 - Soft, Nontender  Hold:  2 - No Assist  Suckin - Long, slow, continuous  TOTAL LATCHES SCORE:  12    Exam:  Constitutional: healthy, alert and no distress  Breasts: Breasts are symmetric, without breast or nipple rash, redness, warmth. Nipple exam: wnl  Psych: Psychiatric: mentation appears normal and affect normal/bright    ASSESSMENT  1. Care and examination of lactating mother    2. Lactation disorder, delivered    3. History of bilateral breast reduction surgery        PLAN  We reviewed maternal benefits to  breastfeeding including decrease in postpartum depression, decreased postpartum bleeding, contraception.     Mom has a hx of br reduction including what sounds like nerve and vascular damage on the R side. We discussed this can markedly interfere witih milk production. Surprisingly, the baby did latch really well for a 37 week old baby (39 wks gestational age today), however weightr gain after feed was 0 oz, and mom milk supply is likely very low. She has been feeding directly at breast x 20-40 min AND pumpin x20 min without much increase in milk supply. We discussed reasonable goals in milk production. She will continue fenugreek supplement and continue to feed directly at the breast (while supplementing baby with formula to ensure weight gain), and decrease pumping to 2 times per day.     There are no Patient Instructions on file for this visit.    40 minutes was spent face-to-face with the patient and family, 100% time spent counseling regarding breastfeeding issues as stated in patient instructions and plan of care.

## 2018-03-08 NOTE — MR AVS SNAPSHOT
After Visit Summary   3/8/2018    Kate Uribe    MRN: [id number]           Patient Information     Date Of Birth          1990        Visit Information        Provider Department      3/8/2018 10:15 AM Estrella Pagan APRN CNP [facility]        Today's Diagnoses     Care and examination of lactating mother    -  1    Lactation disorder, delivered        History of bilateral breast reduction surgery           Follow-ups after your visit        Who to contact     If you have questions or need follow up information about today's clinic visit or your schedule please contact [facility] directly at [phone].  Normal or non-critical lab and imaging results will be communicated to you by SavvyCardhart, letter or phone within 4 business days after the clinic has received the results. If you do not hear from us within 7 days, please contact the clinic through SavvyCardhart or phone. If you have a critical or abnormal lab result, we will notify you by phone as soon as possible.  Submit refill requests through [employer] or call your pharmacy and they will forward the refill request to us. Please allow 3 business days for your refill to be completed.          Additional Information About Your Visit        MyChart Information     [employer] gives you secure access to your electronic health record. If you see a primary care provider, you can also send messages to your care team and make appointments. If you have questions, please call your primary care clinic.  If you do not have a primary care provider, please call [phone] and they will assist you.        Care EveryWhere ID     This is your Care EveryWhere ID. This could be used by other organizations to access your [facility] medical records  [id number]        Your Vitals Were     Pulse Temperature Last Period BMI (Body Mass Index)          116 96.8  F (36  C) (Tympanic) 06/09/2017 48.75 kg/m2         Blood Pressure from Last 3  Encounters:   03/08/18 125/72   02/26/18 128/63   02/09/18 119/59    Weight from Last 3 Encounters:   03/08/18 284 lb (128.8 kg)   02/15/18 299 lb 6.4 oz (135.8 kg)   02/09/18 299 lb (135.6 kg)              Today, you had the following     No orders found for display       Primary Care Provider Office Phone # Fax #    Bibi Gamble [phone] [address] [place] [address]        Equal Access to Services     [facility]: Hadii aad ku hadasho Soomaali, waaxda luqadaha, qaybta kaalmada adelouie, rosendo floyd . So [facility] [phone].    ATENCIÓN: Si habla español, tiene a sandhu disposición servicios gratuitos de asistencia lingüística. [facility] [phone].    We comply with applicable federal civil rights laws and [place] laws. We do not discriminate on the basis of race, color, national origin, age, disability, sex, sexual orientation, or gender identity.            Thank you!     Thank you for choosing [facility]  for your care. Our goal is always to provide you with excellent care. Hearing back from our patients is one way we can continue to improve our services. Please take a few minutes to complete the written survey that you may receive in the mail after your visit with us. Thank you!             Your Updated Medication List - Protect others around you: Learn how to safely use, store and throw away your medicines at www.disposemymeds.org.          This list is accurate as of 3/8/18 11:08 AM.  Always use your most recent med list.                   Brand Name Dispense Instructions for use Diagnosis    albuterol 108 (90 BASE) MCG/ACT Inhaler    PROAIR HFA/PROVENTIL HFA/VENTOLIN HFA    1 Inhaler    Inhale 2 puffs into the lungs every 6 hours    Mild intermittent asthma in adult without complication       ENTOCORT EC PO      Take 3 mg by mouth every morning        fluticasone 110 MCG/ACT Inhaler    FLOVENT HFA    1 Inhaler     Inhale 2 puffs into the lungs 2 times daily    Moderate persistent asthma with exacerbation       IMURAN PO      Take 50 mg by mouth 2 times daily        REMICADE IV      Inject 500 mg into the vein Every 8 weeks        sertraline 100 MG tablet    ZOLOFT    90 tablet    Take 1 tablet (100 mg) by mouth daily    Anxiety       VISTARIL PO      Take 50 mg by mouth once

## 2018-03-19 ENCOUNTER — MYC REFILL (OUTPATIENT)
Dept: PEDIATRICS | Facility: CLINIC | Age: 28
End: 2018-03-19

## 2018-03-19 DIAGNOSIS — J45.20 MILD INTERMITTENT ASTHMA IN ADULT WITHOUT COMPLICATION: ICD-10-CM

## 2018-03-19 DIAGNOSIS — F41.9 ANXIETY: ICD-10-CM

## 2018-03-19 RX ORDER — ALBUTEROL SULFATE 90 UG/1
2 AEROSOL, METERED RESPIRATORY (INHALATION) EVERY 6 HOURS
Qty: 1 INHALER | Refills: 0 | Status: SHIPPED | OUTPATIENT
Start: 2018-03-19 | End: 2018-11-19

## 2018-03-19 RX ORDER — SERTRALINE HYDROCHLORIDE 100 MG/1
100 TABLET, FILM COATED ORAL DAILY
Qty: 90 TABLET | Refills: 1 | Status: SHIPPED | OUTPATIENT
Start: 2018-03-19 | End: 2018-07-06

## 2018-03-19 NOTE — TELEPHONE ENCOUNTER
Message from MyChart:  Original authorizing provider: MD TEZ Vivar would like a refill of the following medications:  sertraline (ZOLOFT) 100 MG tablet [Bibi Gamble MD]  albuterol (PROAIR HFA/PROVENTIL HFA/VENTOLIN HFA) 108 (90 BASE) MCG/ACT Inhaler [Bibi Gamble MD]    Preferred pharmacy: [facility] - [address]     Comment:

## 2018-03-19 NOTE — TELEPHONE ENCOUNTER
"Requested Prescriptions   Pending Prescriptions Disp Refills     sertraline (ZOLOFT) 100 MG tablet 90 tablet 1     Sig: Take 1 tablet (100 mg) by mouth daily    SSRIs Protocol Passed    3/19/2018  1:43 PM       Passed - Recent (12 mo) or future (30 days) visit within the authorizing provider's specialty    Patient had office visit in the last 12 months or has a visit in the next 30 days with authorizing provider or within the authorizing provider's specialty.  See \"Patient Info\" tab in inbasket, or \"Choose Columns\" in Meds & Orders section of the refill encounter.           Passed - Patient is age 18 or older       Passed - No active pregnancy on record       Passed - No positive pregnancy test in last 12 months        albuterol (PROAIR HFA/PROVENTIL HFA/VENTOLIN HFA) 108 (90 BASE) MCG/ACT Inhaler 1 Inhaler 11     Sig: Inhale 2 puffs into the lungs every 6 hours    Asthma Maintenance Inhalers - Anticholinergics Failed    3/19/2018  1:43 PM       Failed - Asthma control assessment score within normal limits in last 6 months    Please review ACT score.          Passed - Patient is age 12 years or older       Passed - Recent (6 mo) or future (30 days) visit within the authorizing provider's specialty    Patient had office visit in the last 6 months or has a visit in the next 30 days with authorizing provider or within the authorizing provider's specialty.  See \"Patient Info\" tab in inbasket, or \"Choose Columns\" in Meds & Orders section of the refill encounter.              "

## 2018-03-19 NOTE — TELEPHONE ENCOUNTER
Prescription approved per [facility] for Sertraline.  Routing refill request to provider for review/approval because:  No recent ACT.      Aidee Viveros RN

## 2018-03-26 ENCOUNTER — MYC MEDICAL ADVICE (OUTPATIENT)
Dept: ENDOCRINOLOGY | Facility: CLINIC | Age: 28
End: 2018-03-26

## 2018-04-26 ENCOUNTER — E-VISIT (OUTPATIENT)
Dept: PEDIATRICS | Facility: CLINIC | Age: 28
End: 2018-04-26
Payer: COMMERCIAL

## 2018-04-26 DIAGNOSIS — J01.00 ACUTE MAXILLARY SINUSITIS, RECURRENCE NOT SPECIFIED: Primary | ICD-10-CM

## 2018-04-26 PROCEDURE — 99444 ZZC PHYSICIAN ONLINE EVALUATION & MANAGEMENT SERVICE: CPT | Performed by: PEDIATRICS

## 2018-04-26 RX ORDER — AZITHROMYCIN 250 MG/1
TABLET, FILM COATED ORAL
Qty: 6 TABLET | Refills: 0 | Status: SHIPPED | OUTPATIENT
Start: 2018-04-26 | End: 2018-06-15

## 2018-05-10 ENCOUNTER — MYC MEDICAL ADVICE (OUTPATIENT)
Dept: SURGERY | Facility: CLINIC | Age: 28
End: 2018-05-10

## 2018-05-11 ENCOUNTER — MYC MEDICAL ADVICE (OUTPATIENT)
Dept: ENDOCRINOLOGY | Facility: CLINIC | Age: 28
End: 2018-05-11

## 2018-05-11 DIAGNOSIS — E66.01 OBESITY, MORBID, BMI 40.0-49.9 (H): Primary | ICD-10-CM

## 2018-05-30 DIAGNOSIS — E66.01 OBESITY, MORBID, BMI 40.0-49.9 (H): ICD-10-CM

## 2018-05-30 PROCEDURE — 84443 ASSAY THYROID STIM HORMONE: CPT | Performed by: CLINICAL NURSE SPECIALIST

## 2018-05-30 PROCEDURE — 36415 COLL VENOUS BLD VENIPUNCTURE: CPT | Performed by: CLINICAL NURSE SPECIALIST

## 2018-05-30 PROCEDURE — 80048 BASIC METABOLIC PNL TOTAL CA: CPT | Performed by: CLINICAL NURSE SPECIALIST

## 2018-05-30 PROCEDURE — 84439 ASSAY OF FREE THYROXINE: CPT | Performed by: CLINICAL NURSE SPECIALIST

## 2018-05-30 PROCEDURE — 80061 LIPID PANEL: CPT | Performed by: CLINICAL NURSE SPECIALIST

## 2018-05-31 LAB
ANION GAP SERPL CALCULATED.3IONS-SCNC: 9 MMOL/L (ref 3–14)
BUN SERPL-MCNC: 13 MG/DL (ref 7–30)
CALCIUM SERPL-MCNC: 9.3 MG/DL (ref 8.5–10.1)
CHLORIDE SERPL-SCNC: 104 MMOL/L (ref 94–109)
CHOLEST SERPL-MCNC: 191 MG/DL
CO2 SERPL-SCNC: 25 MMOL/L (ref 20–32)
CREAT SERPL-MCNC: 0.65 MG/DL (ref 0.52–1.04)
GFR SERPL CREATININE-BSD FRML MDRD: >90 ML/MIN/1.7M2
GLUCOSE SERPL-MCNC: 88 MG/DL (ref 70–99)
HDLC SERPL-MCNC: 41 MG/DL
LDLC SERPL CALC-MCNC: 110 MG/DL
NONHDLC SERPL-MCNC: 150 MG/DL
POTASSIUM SERPL-SCNC: 4.3 MMOL/L (ref 3.4–5.3)
SODIUM SERPL-SCNC: 138 MMOL/L (ref 133–144)
T4 FREE SERPL-MCNC: 0.91 NG/DL (ref 0.76–1.46)
TRIGL SERPL-MCNC: 199 MG/DL
TSH SERPL DL<=0.005 MIU/L-ACNC: 1.3 MU/L (ref 0.4–4)

## 2018-06-03 NOTE — PROGRESS NOTES
Katelyn,  Here's a copy of your recent lab results for your records.  We'll discuss the results in detail at your upcoming appointment.  Carolina Harvey NP  Endocrinology

## 2018-06-08 ENCOUNTER — MYC MEDICAL ADVICE (OUTPATIENT)
Dept: PEDIATRICS | Facility: CLINIC | Age: 28
End: 2018-06-08

## 2018-06-08 NOTE — TELEPHONE ENCOUNTER
huddled with Dr. Gamble needed.  Patient advised.  Lorena Guzman RN  Message handled by Nurse Triage with Huddle - provider name: Mavis.

## 2018-06-15 ENCOUNTER — OFFICE VISIT (OUTPATIENT)
Dept: PEDIATRICS | Facility: CLINIC | Age: 28
End: 2018-06-15
Payer: COMMERCIAL

## 2018-06-15 VITALS
WEIGHT: 291 LBS | TEMPERATURE: 98.6 F | HEIGHT: 64 IN | BODY MASS INDEX: 49.68 KG/M2 | HEART RATE: 85 BPM | SYSTOLIC BLOOD PRESSURE: 122 MMHG | OXYGEN SATURATION: 97 % | DIASTOLIC BLOOD PRESSURE: 62 MMHG

## 2018-06-15 DIAGNOSIS — E66.01 OBESITY, MORBID, BMI 40.0-49.9 (H): ICD-10-CM

## 2018-06-15 DIAGNOSIS — F41.1 GAD (GENERALIZED ANXIETY DISORDER): Primary | ICD-10-CM

## 2018-06-15 DIAGNOSIS — K50.00 CROHN'S DISEASE OF SMALL INTESTINE WITHOUT COMPLICATION (H): ICD-10-CM

## 2018-06-15 DIAGNOSIS — F32.0 MILD SINGLE CURRENT EPISODE OF MAJOR DEPRESSIVE DISORDER (H): ICD-10-CM

## 2018-06-15 PROCEDURE — 99214 OFFICE O/P EST MOD 30 MIN: CPT | Performed by: PEDIATRICS

## 2018-06-15 RX ORDER — FLUOXETINE 40 MG/1
40 CAPSULE ORAL DAILY
Qty: 30 CAPSULE | Refills: 1 | Status: SHIPPED | OUTPATIENT
Start: 2018-06-15 | End: 2018-07-06

## 2018-06-15 RX ORDER — PHENTERMINE HYDROCHLORIDE 15 MG/1
15 CAPSULE ORAL EVERY MORNING
Qty: 30 CAPSULE | Refills: 0 | Status: SHIPPED | OUTPATIENT
Start: 2018-06-15 | End: 2018-08-10

## 2018-06-15 ASSESSMENT — ANXIETY QUESTIONNAIRES
IF YOU CHECKED OFF ANY PROBLEMS ON THIS QUESTIONNAIRE, HOW DIFFICULT HAVE THESE PROBLEMS MADE IT FOR YOU TO DO YOUR WORK, TAKE CARE OF THINGS AT HOME, OR GET ALONG WITH OTHER PEOPLE: VERY DIFFICULT
3. WORRYING TOO MUCH ABOUT DIFFERENT THINGS: NEARLY EVERY DAY
1. FEELING NERVOUS, ANXIOUS, OR ON EDGE: MORE THAN HALF THE DAYS
6. BECOMING EASILY ANNOYED OR IRRITABLE: NEARLY EVERY DAY
7. FEELING AFRAID AS IF SOMETHING AWFUL MIGHT HAPPEN: NEARLY EVERY DAY
2. NOT BEING ABLE TO STOP OR CONTROL WORRYING: NEARLY EVERY DAY
GAD7 TOTAL SCORE: 16
5. BEING SO RESTLESS THAT IT IS HARD TO SIT STILL: NOT AT ALL

## 2018-06-15 ASSESSMENT — PATIENT HEALTH QUESTIONNAIRE - PHQ9: 5. POOR APPETITE OR OVEREATING: MORE THAN HALF THE DAYS

## 2018-06-15 NOTE — PROGRESS NOTES
"  SUBJECTIVE:   Kate Uribe is a 28 year old female who presents to clinic today for the following health issues:      Depression and Anxiety Follow-Up    Status since last visit: Worsened - she is very discouraged by her weight and lack of motivation. She tried doing the keto diet with  - he lost 45 # and she didn't lose any weight.  She is now doing weight watchers.  She is concerned that zoloft may be causing the weight gain, but also feels that she needs to be on something for the depression and anxiety.  She is constantly worried about the kids, nivia around water and \"feels there is so much going on inside her head.\"  She had stopped her therapy but will be restarting this next week.     Other associated symptoms:Baby born 2/23/2018 - third child. Feeling more depressed lately      Complicating factors:     Significant life event: Yes-  New job, new baby. Now working full time at [employer]    Current substance abuse: None    No flowsheet data found.  No flowsheet data found.    PHQ-9  English  PHQ-9   Any Language  MILA-7  Suicide Assessment Five-step Evaluation and Treatment (SAFE-T)    Amount of exercise or physical activity: None - planning to start walking three times per week.    Problems taking medications regularly: No    Medication side effects: ?weight gain    Diet: carbohydrate counting            Problem list and histories reviewed & adjusted, as indicated.  Additional history: as documented        Reviewed and updated as needed this visit by clinical staff       Reviewed and updated as needed this visit by Provider         ROS:  Constitutional, HEENT, cardiovascular, pulmonary, gi and gu systems are negative, except as otherwise noted.    OBJECTIVE:     /62 (BP Location: Right arm, Cuff Size: Adult Large)  Pulse 85  Temp 98.6  F (37  C)  Ht 5' 4\" (1.626 m)  Wt 291 lb (132 kg)  SpO2 97%  BMI 49.95 kg/m2  Body mass index is 49.95 kg/(m^2).  NA    Diagnostic Test " Results:  none     ASSESSMENT/PLAN:       1. MILA (generalized anxiety disorder)  Will try switching to prozac which should be more weight neutral than zoloft - can do direct switch since another SSRI.  She is NOT breastfeeding.  - FLUoxetine (PROZAC) 40 MG capsule; Take 1 capsule (40 mg) by mouth daily  Dispense: 30 capsule; Refill: 1  - phentermine 15 MG capsule; Take 1 capsule (15 mg) by mouth every morning  Dispense: 30 capsule; Refill: 0    2. Mild single current episode of major depressive disorder (H)  As above    3. Obesity, morbid, BMI 40.0-49.9 (H)  Will try phentermine - need 5% weight loss in 12 weeks (14#).  Follow up in 3-4 weeks.  Starting at lowest dose.     4. Crohns - continue under GI care - doing remicade, no imuran right now.      Patient Instructions   Direct switch from zoloft to prozac.    Start phentermine.    Follow up in 4 weeks.      Bibi Gamble MD  [facility]

## 2018-06-15 NOTE — MR AVS SNAPSHOT
After Visit Summary   6/15/2018    Kate Uribe    MRN: [id number]           Patient Information     Date Of Birth          1990        Visit Information        Provider Department      6/15/2018 2:00 PM Bibi Gamble MD [facility]        Today's Diagnoses     MILA (generalized anxiety disorder)    -  1      Care Instructions    Direct switch from zoloft to prozac.    Start phentermine.    Follow up in 4 weeks.          Follow-ups after your visit        Follow-up notes from your care team     Return in about 4 weeks (around 7/13/2018) for med check.      Your next 10 appointments already scheduled     Jun 28, 2018  2:30 PM CDT   [facility] with NEIDA Gillespie CNP   [facility]    [address]  [facility] [place] [address] [phone]              Who to contact     If you have questions or need follow up information about today's clinic visit or your schedule please contact [facility] directly at [phone].  Normal or non-critical lab and imaging results will be communicated to you by [employer]ot [employer] Storehart, letter or phone within 4 business days after the clinic has received the results. If you do not hear from us within 7 days, please contact the clinic through [employer] Store[employer] or phone. If you have a critical or abnormal lab result, we will notify you by phone as soon as possible.  Submit refill requests through [employer] or call your pharmacy and they will forward the refill request to us. Please allow 3 business days for your refill to be completed.          Additional Information About Your Visit        MyChart Information     [employer] gives you secure access to your electronic health record. If you see a primary care provider, you can also send messages to your care team and make appointments. If you have questions, please call your primary care clinic.  If you do not have a primary care provider,  "please call [phone] and they will assist you.        Care EveryWhere ID     This is your Care EveryWhere ID. This could be used by other organizations to access your [facility] medical records  [id number]        Your Vitals Were     Pulse Temperature Height Pulse Oximetry BMI (Body Mass Index)       85 98.6  F (37  C) 5' 4\" (1.626 m) 97% 49.95 kg/m2        Blood Pressure from Last 3 Encounters:   06/15/18 122/62   03/08/18 125/72   02/26/18 128/63    Weight from Last 3 Encounters:   06/15/18 291 lb (132 kg)   03/08/18 284 lb (128.8 kg)   02/15/18 299 lb 6.4 oz (135.8 kg)              Today, you had the following     No orders found for display         Today's Medication Changes          These changes are accurate as of 6/15/18  2:33 PM.  If you have any questions, ask your nurse or doctor.               Start taking these medicines.        Dose/Directions    FLUoxetine 40 MG capsule   Commonly known as:  PROzac   Used for:  MILA (generalized anxiety disorder)   Started by:  Bibi Gamble MD        Dose:  40 mg   Take 1 capsule (40 mg) by mouth daily   Quantity:  30 capsule   Refills:  1       phentermine 15 MG capsule   Used for:  MILA (generalized anxiety disorder)   Started by:  Bibi Gamble MD        Dose:  15 mg   Take 1 capsule (15 mg) by mouth every morning   Quantity:  30 capsule   Refills:  0            Where to get your medicines      These medications were sent to [facility] - [address]   [address]  [address], [place] [address]     Phone:  [phone]     FLUoxetine 40 MG capsule         Some of these will need a paper prescription and others can be bought over the counter.  Ask your nurse if you have questions.     Bring a paper prescription for each of these medications     phentermine 15 MG capsule                Primary Care Provider Office Phone # Fax #    Bibi Gamble [phone] [address] [place] [address]        Equal Access to Services     [facility] : Hadii pancho agudelo hadkinayohana Sooswaldo, waaxda luqadaha, qaybta kaalmahonorio torres, rosendo lion. So [facility] [phone].    ATENCIÓN: Si habla español, tiene a sandhu disposición servicios gratuitos de asistencia lingüística. [facility] [phone].    We comply with applicable federal civil rights laws and [place] laws. We do not discriminate on the basis of race, color, national origin, age, disability, sex, sexual orientation, or gender identity.            Thank you!     [facility] your [facility]. Our goal is always to provide you with excellent care. Hearing back from our patients is one way we can continue to improve our services. Please take a few minutes to complete the written survey that you may receive in the mail after your visit with us. Thank you!             Your Updated Medication List - Protect others around you: Learn how to safely use, store and throw away your medicines at www.disposemymeds.org.          This list is accurate as of 6/15/18  2:33 PM.  Always use your most recent med list.                   Brand Name Dispense Instructions for use Diagnosis    albuterol 108 (90 Base) MCG/ACT Inhaler    PROAIR HFA/PROVENTIL HFA/VENTOLIN HFA    1 Inhaler    Inhale 2 puffs into the lungs every 6 hours    Mild intermittent asthma in adult without complication       FLUoxetine 40 MG capsule    PROzac    30 capsule    Take 1 capsule (40 mg) by mouth daily    MILA (generalized anxiety disorder)       fluticasone 110 MCG/ACT Inhaler    FLOVENT HFA    1 Inhaler    Inhale 2 puffs into the lungs 2 times daily    Moderate persistent asthma with exacerbation       IMURAN PO      Take 50 mg by mouth 2 times daily        phentermine 15 MG capsule     30 capsule    Take 1 capsule (15 mg) by mouth every morning    MILA (generalized anxiety disorder)       REMICADE IV      Inject 500 mg  into the vein Every 8 weeks        sertraline 100 MG tablet    ZOLOFT    90 tablet    Take 1 tablet (100 mg) by mouth daily    Anxiety       VISTARIL PO      Take 50 mg by mouth once

## 2018-06-15 NOTE — PROGRESS NOTES
"  SUBJECTIVE:   Kate Uribe is a 28 year old female who presents to clinic today for the following health issues:      Anxiety Follow-Up    Status since last visit: { :806353::\"No change\"}    Other associated symptoms:{ :829809::\"None\"}    Complicating factors:   Significant life event: { :888800::\"No\"}   Current substance abuse: { :214799::\"None\"}  Depression symptoms: { :271988::\"No\"}  No flowsheet data found.    MILA-7    Amount of exercise or physical activity: {Exercise frequency days per week:207791}    Problems taking medications regularly: {Med Problems:128245::\"No\"}    Medication side effects: {CHRONIC MED SIDE EFFECTS:084996::\"none\"}    Diet: { :480604}        {additional problems for provider to add:340333}    Problem list and histories reviewed & adjusted, as indicated.  Additional history: {NONE - AS DOCUMENTED:678206::\"as documented\"}    {HIST REVIEW/ LINKS 2:200288}    Reviewed and updated as needed this visit by clinical staff       Reviewed and updated as needed this visit by Provider         {PROVIDER CHARTING PREFERENCE:454373}  "

## 2018-06-16 ASSESSMENT — ANXIETY QUESTIONNAIRES: GAD7 TOTAL SCORE: 16

## 2018-06-16 ASSESSMENT — PATIENT HEALTH QUESTIONNAIRE - PHQ9: SUM OF ALL RESPONSES TO PHQ QUESTIONS 1-9: 12

## 2018-06-19 ENCOUNTER — MYC MEDICAL ADVICE (OUTPATIENT)
Dept: PEDIATRICS | Facility: CLINIC | Age: 28
End: 2018-06-19

## 2018-06-20 NOTE — TELEPHONE ENCOUNTER
Huddled with PCP-keep taking phentermine at 15 mg daily until Monday next week. Given an update then.  Not sure if this should work right away or take more time.    Patient advised.    Lorena Guzman RN  Message handled by Nurse Triage.

## 2018-06-25 ENCOUNTER — MYC MEDICAL ADVICE (OUTPATIENT)
Dept: PEDIATRICS | Facility: CLINIC | Age: 28
End: 2018-06-25

## 2018-06-25 DIAGNOSIS — E66.01 OBESITY, MORBID, BMI 40.0-49.9 (H): Primary | ICD-10-CM

## 2018-06-26 NOTE — TELEPHONE ENCOUNTER
Please review patient's message.  Should the dose be increased, or should the patient continue the same 15 mg phentermine dose?    Lorena Guzman RN  Message handled by Nurse Triage.

## 2018-06-27 ENCOUNTER — MYC MEDICAL ADVICE (OUTPATIENT)
Dept: PEDIATRICS | Facility: CLINIC | Age: 28
End: 2018-06-27

## 2018-06-27 RX ORDER — PHENTERMINE HYDROCHLORIDE 30 MG/1
30 CAPSULE ORAL EVERY MORNING
Qty: 30 CAPSULE | Refills: 0 | Status: SHIPPED | OUTPATIENT
Start: 2018-06-27 | End: 2018-08-10

## 2018-06-27 NOTE — TELEPHONE ENCOUNTER
Ok to increase to 30mg - new prescription printed in my outbox.    Bibi Gamble MD  Internal Medicine/Pediatrics  [facility]

## 2018-06-27 NOTE — TELEPHONE ENCOUNTER
Script walked to the pharmacy.  Patient advised.  Lorena Guzman RN  Message handled by Nurse Triage.

## 2018-07-06 ENCOUNTER — OFFICE VISIT (OUTPATIENT)
Dept: PEDIATRICS | Facility: CLINIC | Age: 28
End: 2018-07-06
Payer: COMMERCIAL

## 2018-07-06 VITALS
OXYGEN SATURATION: 98 % | SYSTOLIC BLOOD PRESSURE: 110 MMHG | DIASTOLIC BLOOD PRESSURE: 66 MMHG | TEMPERATURE: 98 F | HEART RATE: 100 BPM | BODY MASS INDEX: 49.4 KG/M2 | WEIGHT: 287.8 LBS

## 2018-07-06 DIAGNOSIS — F32.0 MILD SINGLE CURRENT EPISODE OF MAJOR DEPRESSIVE DISORDER (H): ICD-10-CM

## 2018-07-06 DIAGNOSIS — F41.1 GAD (GENERALIZED ANXIETY DISORDER): ICD-10-CM

## 2018-07-06 DIAGNOSIS — E66.01 OBESITY, MORBID, BMI 40.0-49.9 (H): Primary | ICD-10-CM

## 2018-07-06 PROCEDURE — 99214 OFFICE O/P EST MOD 30 MIN: CPT | Performed by: PEDIATRICS

## 2018-07-06 RX ORDER — FLUOXETINE 40 MG/1
40 CAPSULE ORAL DAILY
Qty: 90 CAPSULE | Refills: 1 | Status: SHIPPED | OUTPATIENT
Start: 2018-07-06 | End: 2018-09-12

## 2018-07-06 ASSESSMENT — ANXIETY QUESTIONNAIRES
3. WORRYING TOO MUCH ABOUT DIFFERENT THINGS: SEVERAL DAYS
5. BEING SO RESTLESS THAT IT IS HARD TO SIT STILL: NOT AT ALL
IF YOU CHECKED OFF ANY PROBLEMS ON THIS QUESTIONNAIRE, HOW DIFFICULT HAVE THESE PROBLEMS MADE IT FOR YOU TO DO YOUR WORK, TAKE CARE OF THINGS AT HOME, OR GET ALONG WITH OTHER PEOPLE: SOMEWHAT DIFFICULT
6. BECOMING EASILY ANNOYED OR IRRITABLE: SEVERAL DAYS
7. FEELING AFRAID AS IF SOMETHING AWFUL MIGHT HAPPEN: SEVERAL DAYS
GAD7 TOTAL SCORE: 6
2. NOT BEING ABLE TO STOP OR CONTROL WORRYING: SEVERAL DAYS
1. FEELING NERVOUS, ANXIOUS, OR ON EDGE: SEVERAL DAYS

## 2018-07-06 ASSESSMENT — PATIENT HEALTH QUESTIONNAIRE - PHQ9: 5. POOR APPETITE OR OVEREATING: SEVERAL DAYS

## 2018-07-06 NOTE — PROGRESS NOTES
"  SUBJECTIVE:   Kate Uribe is a 28 year old female who presents to clinic today for the following health issues:    Medication Followup of Fluoxitine and phenermine    Taking Medication as prescribed: yes    Side Effects:  None    Medication Helping Symptoms:  yes     Depression/anxiety - feels much better than before.  Happy at current dose.  Able to handle stress better.    Has noticed some decreased appetite with phentermine \"less going back for seconds.\"    Wt Readings from Last 3 Encounters:   07/06/18 287 lb 12.8 oz (130.5 kg)   06/15/18 291 lb (132 kg)   03/08/18 284 lb (128.8 kg)     We previously discussed increasing dose - patient cannot pick this up until 7/11.    Depression and Anxiety Follow-Up    Status since last visit: Improved     Other associated symptoms:None    Complicating factors:     Significant life event: No     Current substance abuse: None    PHQ-9 6/15/2018   Total Score 12   Q9: Suicide Ideation Not at all     MILA-7 SCORE 6/15/2018   Total Score 16         PHQ-9  English  PHQ-9   Any Language  MILA-7  Suicide Assessment Five-step Evaluation and Treatment (SAFE-T)      Problem list and histories reviewed & adjusted, as indicated.  Additional history: as documented        Reviewed and updated as needed this visit by clinical staff       Reviewed and updated as needed this visit by Provider         ROS:  Constitutional, HEENT, cardiovascular, pulmonary, gi and gu systems are negative, except as otherwise noted.    OBJECTIVE:     /66 (BP Location: Right arm, Patient Position: Chair, Cuff Size: Adult Large)  Pulse 100  Temp 98  F (36.7  C) (Oral)  Wt 287 lb 12.8 oz (130.5 kg)  SpO2 98%  BMI 49.4 kg/m2  Body mass index is 49.4 kg/(m^2).  GENERAL APPEARANCE: healthy, alert and no distress  CV: regular rates and rhythm, normal S1 S2, no S3 or S4 and no murmur, click or rub    Diagnostic Test Results:  none     ASSESSMENT/PLAN:       1. MILA (generalized anxiety disorder)  Well " controlled, now stable.  Continue at 40mg - will need to see patient every 6 months.  - FLUoxetine (PROZAC) 40 MG capsule; Take 1 capsule (40 mg) by mouth daily  Dispense: 90 capsule; Refill: 1    2. Obesity, morbid, BMI 40.0-49.9 (H)  Continue phentermine - will be increasing dose to 30mg, then follow up monthly x 3 months.  Needs to lose 5% (14#) in the first 12 weeks to continue this medication.    3. Mild single current episode of major depressive disorder (H)  See #1      Patient Instructions   Start new dose when able.    FOLLOW UP in 1 month.      Bibi Gamble MD  [facility]

## 2018-07-06 NOTE — MR AVS SNAPSHOT
After Visit Summary   7/6/2018    Kate Uribe    MRN: [id number]           Patient Information     Date Of Birth          1990        Visit Information        Provider Department      7/6/2018 1:40 PM Bibi Gamble MD [facility]        Today's Diagnoses     MILA (generalized anxiety disorder)          Care Instructions    Start new dose when able.    FOLLOW UP in 1 month.          Follow-ups after your visit        Follow-up notes from your care team     Return in about 4 weeks (around 8/3/2018) for med check.      Who to contact     If you have questions or need follow up information about today's clinic visit or your schedule please contact [facility] directly at [phone].  Normal or non-critical lab and imaging results will be communicated to you by [employer], letter or phone within 4 business days after the clinic has received the results. If you do not hear from us within 7 days, please contact the clinic through [employer] or phone. If you have a critical or abnormal lab result, we will notify you by phone as soon as possible.  Submit refill requests through [employer] or call your pharmacy and they will forward the refill request to us. Please allow 3 business days for your refill to be completed.          Additional Information About Your Visit        MyChart Information     [employer] gives you secure access to your electronic health record. If you see a primary care provider, you can also send messages to your care team and make appointments. If you have questions, please call your primary care clinic.  If you do not have a primary care provider, please call [phone] and they will assist you.        Care EveryWhere ID     This is your Care EveryWhere ID. This could be used by other organizations to access your [place] medical records  [id number]        Your Vitals Were     Pulse Temperature Pulse Oximetry BMI (Body Mass Index)          100 98  F (36.7   C) (Oral) 98% 49.4 kg/m2         Blood Pressure from Last 3 Encounters:   07/06/18 110/66   06/15/18 122/62   03/08/18 125/72    Weight from Last 3 Encounters:   07/06/18 287 lb 12.8 oz (130.5 kg)   06/15/18 291 lb (132 kg)   03/08/18 284 lb (128.8 kg)              Today, you had the following     No orders found for display         Where to get your medicines      These medications were sent to [facility] - [address]   [address], [place] [address]     Phone:  [phone]     FLUoxetine 40 MG capsule          Primary Care Provider Office Phone # Fax #    Bibi Gamble [phone] [address] [place] [address]        Equal Access to Services     [facility]: Hadii pancho agudelo hadasho Sooswaldo, waaxda luqadaha, qaybta kaalmada brielleyada, rosendo floyd . So [facility] [phone].    ATENCIÓN: Si habla español, tiene a sandhu disposición servicios gratuitos de asistencia lingüística. Llame al [phone].    We comply with applicable federal civil rights laws and [place] laws. We do not discriminate on the basis of race, color, national origin, age, disability, sex, sexual orientation, or gender identity.            Thank you!     [facility]  [facility] your [facility]. Our goal is always to provide you with excellent care. Hearing back from our patients is one way we can continue to improve our services. Please take a few minutes to complete the written survey that you may receive in the mail after your visit with us. Thank you!             Your Updated Medication List - Protect others around you: Learn how to safely use, store and throw away your medicines at www.disposemymeds.org.          This list is accurate as of 7/6/18  2:08 PM.  Always use your most recent med list.                   Brand Name Dispense Instructions for use Diagnosis    albuterol 108 (90  Base) MCG/ACT Inhaler    PROAIR HFA/PROVENTIL HFA/VENTOLIN HFA    1 Inhaler    Inhale 2 puffs into the lungs every 6 hours    Mild intermittent asthma in adult without complication       FLUoxetine 40 MG capsule    PROzac    90 capsule    Take 1 capsule (40 mg) by mouth daily    MILA (generalized anxiety disorder)       fluticasone 110 MCG/ACT Inhaler    FLOVENT HFA    1 Inhaler    Inhale 2 puffs into the lungs 2 times daily    Moderate persistent asthma with exacerbation       IMURAN PO      Take 50 mg by mouth 2 times daily        * phentermine 15 MG capsule     30 capsule    Take 1 capsule (15 mg) by mouth every morning    MILA (generalized anxiety disorder)       * phentermine 30 MG capsule     30 capsule    Take 1 capsule (30 mg) by mouth every morning    Obesity, morbid, BMI 40.0-49.9 (H)       REMICADE IV      Inject 500 mg into the vein Every 8 weeks        VISTARIL PO      Take 50 mg by mouth once        * Notice:  This list has 2 medication(s) that are the same as other medications prescribed for you. Read the directions carefully, and ask your doctor or other care provider to review them with you.

## 2018-07-07 ASSESSMENT — ANXIETY QUESTIONNAIRES: GAD7 TOTAL SCORE: 6

## 2018-08-01 ENCOUNTER — MYC MEDICAL ADVICE (OUTPATIENT)
Dept: PEDIATRICS | Facility: CLINIC | Age: 28
End: 2018-08-01

## 2018-08-01 NOTE — TELEPHONE ENCOUNTER
Pt sent a [employer] message stating that she has been feeling pretty good, up until this past week.  She states that she is experiencing increased anxiety and states that she doesn't feel as though it is working.  She is wondering if she should increase her dose or wait for appt next week.  Pt does have an appt scheduled with  on 8/10/18 at 1520.    Please advise.    Sujatha Flores RN

## 2018-08-06 ENCOUNTER — MYC MEDICAL ADVICE (OUTPATIENT)
Dept: PEDIATRICS | Facility: CLINIC | Age: 28
End: 2018-08-06

## 2018-08-06 ENCOUNTER — E-VISIT (OUTPATIENT)
Dept: PEDIATRICS | Facility: CLINIC | Age: 28
End: 2018-08-06
Payer: COMMERCIAL

## 2018-08-06 DIAGNOSIS — J01.00 ACUTE MAXILLARY SINUSITIS, RECURRENCE NOT SPECIFIED: Primary | ICD-10-CM

## 2018-08-06 PROCEDURE — 99444 ZZC PHYSICIAN ONLINE EVALUATION & MANAGEMENT SERVICE: CPT | Performed by: PEDIATRICS

## 2018-08-06 RX ORDER — AZITHROMYCIN 250 MG/1
TABLET, FILM COATED ORAL
Qty: 6 TABLET | Refills: 0 | Status: SHIPPED | OUTPATIENT
Start: 2018-08-06 | End: 2018-08-10

## 2018-08-10 ENCOUNTER — OFFICE VISIT (OUTPATIENT)
Dept: PEDIATRICS | Facility: CLINIC | Age: 28
End: 2018-08-10
Payer: COMMERCIAL

## 2018-08-10 VITALS
DIASTOLIC BLOOD PRESSURE: 68 MMHG | BODY MASS INDEX: 47.29 KG/M2 | HEIGHT: 64 IN | OXYGEN SATURATION: 95 % | WEIGHT: 277 LBS | SYSTOLIC BLOOD PRESSURE: 118 MMHG | TEMPERATURE: 98.2 F | HEART RATE: 86 BPM

## 2018-08-10 DIAGNOSIS — F32.0 MILD SINGLE CURRENT EPISODE OF MAJOR DEPRESSIVE DISORDER (H): Primary | ICD-10-CM

## 2018-08-10 DIAGNOSIS — E66.01 OBESITY, MORBID, BMI 40.0-49.9 (H): ICD-10-CM

## 2018-08-10 DIAGNOSIS — F41.1 GAD (GENERALIZED ANXIETY DISORDER): ICD-10-CM

## 2018-08-10 PROCEDURE — 99214 OFFICE O/P EST MOD 30 MIN: CPT | Performed by: PEDIATRICS

## 2018-08-10 RX ORDER — PHENTERMINE HYDROCHLORIDE 30 MG/1
30 CAPSULE ORAL EVERY MORNING
Qty: 30 CAPSULE | Refills: 0 | Status: SHIPPED | OUTPATIENT
Start: 2018-08-10 | End: 2018-09-08

## 2018-08-10 ASSESSMENT — ANXIETY QUESTIONNAIRES
3. WORRYING TOO MUCH ABOUT DIFFERENT THINGS: SEVERAL DAYS
IF YOU CHECKED OFF ANY PROBLEMS ON THIS QUESTIONNAIRE, HOW DIFFICULT HAVE THESE PROBLEMS MADE IT FOR YOU TO DO YOUR WORK, TAKE CARE OF THINGS AT HOME, OR GET ALONG WITH OTHER PEOPLE: SOMEWHAT DIFFICULT
6. BECOMING EASILY ANNOYED OR IRRITABLE: SEVERAL DAYS
1. FEELING NERVOUS, ANXIOUS, OR ON EDGE: SEVERAL DAYS
GAD7 TOTAL SCORE: 6
2. NOT BEING ABLE TO STOP OR CONTROL WORRYING: SEVERAL DAYS
5. BEING SO RESTLESS THAT IT IS HARD TO SIT STILL: NOT AT ALL
7. FEELING AFRAID AS IF SOMETHING AWFUL MIGHT HAPPEN: SEVERAL DAYS

## 2018-08-10 ASSESSMENT — PATIENT HEALTH QUESTIONNAIRE - PHQ9: 5. POOR APPETITE OR OVEREATING: SEVERAL DAYS

## 2018-08-10 NOTE — PROGRESS NOTES
"  SUBJECTIVE:   Kate Uribe is a 28 year old female who presents to clinic today for the following health issues:      Medication Followup of  Prozac and Phentermine     Taking Medication as prescribed: yes    Side Effects:  None    Medication Helping Symptoms:  yes     One month into taking phentermine 30mg - has lost 10# - she feels good about this. Only side effects was some nausea last week. She also restarted keto diet about 6 weeks ago too.    Increased dose of prozac yesterday to 80mg as we discussed over mychart.  Lots of anxiety with kids at home right now - worrying about kids.     Problem list and histories reviewed & adjusted, as indicated.  Additional history: as documented        Reviewed and updated as needed this visit by clinical staff       Reviewed and updated as needed this visit by Provider         ROS:  Constitutional, HEENT, cardiovascular, pulmonary, gi and gu systems are negative, except as otherwise noted.    OBJECTIVE:     /68 (BP Location: Right arm, Cuff Size: Adult Large)  Pulse 86  Temp 98.2  F (36.8  C) (Oral)  Ht 5' 4\" (1.626 m)  Wt 277 lb (125.6 kg)  SpO2 95%  BMI 47.55 kg/m2  Body mass index is 47.55 kg/(m^2).  GENERAL APPEARANCE: healthy, alert and no distress  CV: regular rates and rhythm, normal S1 S2, no S3 or S4 and no murmur, click or rub    Diagnostic Test Results:  none     ASSESSMENT/PLAN:       1. Obesity, morbid, BMI 40.0-49.9 (H)  Good weight loss so far - 10# down (goal is 14# down from weight 7/6/18).  Plan to continue 30mg and follow up in 1 month.  Talked about long term use - good that she is also making sustainable diet changes.  With keto will plan to check cholesterol 3-6 months into diet.   - phentermine 30 MG capsule; Take 1 capsule (30 mg) by mouth every morning  Dispense: 30 capsule; Refill: 0    2. Mild single current episode of major depressive disorder (H)  Increase prozac to 80mg - will follow up in 1 month with phentermine check    3. " MILA (generalized anxiety disorder)  See #2      Patient Instructions   Follow-up in 1 month.    Continue phentermine 30mg    Continue prozac 80mg      Bibi Gamble MD  [facility]

## 2018-08-10 NOTE — MR AVS SNAPSHOT
"              After Visit Summary   8/10/2018    Kate Uribe    MRN: [id number]           Patient Information     Date Of Birth          1990        Visit Information        Provider Department      8/10/2018 3:20 PM Bibi Gamble MD [facility]        Today's Diagnoses     Obesity, morbid, BMI 40.0-49.9 (H)          Care Instructions    Follow-up in 1 month.    Continue phentermine 30mg    Continue prozac 80mg          Follow-ups after your visit        Who to contact     If you have questions or need follow up information about today's clinic visit or your schedule please contact [facility] directly at [phone].  Normal or non-critical lab and imaging results will be communicated to you by [employer], letter or phone within 4 business days after the clinic has received the results. If you do not hear from us within 7 days, please contact the clinic through [employer] or phone. If you have a critical or abnormal lab result, we will notify you by phone as soon as possible.  Submit refill requests through [employer] or call your pharmacy and they will forward the refill request to us. Please allow 3 business days for your refill to be completed.          Additional Information About Your Visit        MyChart Information     [employer] gives you secure access to your electronic health record. If you see a primary care provider, you can also send messages to your care team and make appointments. If you have questions, please call your primary care clinic.  If you do not have a primary care provider, please call [phone] and they will assist you.        Care EveryWhere ID     This is your Care EveryWhere ID. This could be used by other organizations to access your [place] medical records  [id number]        Your Vitals Were     Pulse Temperature Height Pulse Oximetry BMI (Body Mass Index)       86 98.2  F (36.8  C) (Oral) 5' 4\" (1.626 m) 95% 47.55 kg/m2        Blood Pressure from " Last 3 Encounters:   08/10/18 118/68   07/06/18 110/66   06/15/18 122/62    Weight from Last 3 Encounters:   08/10/18 277 lb (125.6 kg)   07/06/18 287 lb 12.8 oz (130.5 kg)   06/15/18 291 lb (132 kg)              Today, you had the following     No orders found for display         Today's Medication Changes          These changes are accurate as of 8/10/18  3:38 PM.  If you have any questions, ask your nurse or doctor.               These medicines have changed or have updated prescriptions.        Dose/Directions    phentermine 30 MG capsule   This may have changed:  Another medication with the same name was removed. Continue taking this medication, and follow the directions you see here.   Used for:  Obesity, morbid, BMI 40.0-49.9 (H)   Changed by:  Bibi Gamble MD        Dose:  30 mg   Take 1 capsule (30 mg) by mouth every morning   Quantity:  30 capsule   Refills:  0            Where to get your medicines      Some of these will need a paper prescription and others can be bought over the counter.  Ask your nurse if you have questions.     Bring a paper prescription for each of these medications     phentermine 30 MG capsule                Primary Care Provider Office Phone # Fax #    Bibi Gamble [phone] [address] [place] [address]        Equal Access to Services     [facility]: Hadii pancho ku hadasho Soomaali, waaxda luqadaha, qaybta kaalmada adeegyada, rosendo souza hayaly floyd . So [facility] [phone].    ATENCIÓN: Si habla español, tiene a sandhu disposición servicios gratuitos de asistencia lingüística. Llame al [phone].    We comply with applicable federal civil rights laws and [place] laws. We do not discriminate on the basis of race, color, national origin, age, disability, sex, sexual orientation, or gender identity.            Thank you!     Thank you for choosing [facility]  for your care. Our goal is always to  provide you with excellent care. Hearing back from our patients is one way we can continue to improve our services. Please take a few minutes to complete the written survey that you may receive in the mail after your visit with us. Thank you!             Your Updated Medication List - Protect others around you: Learn how to safely use, store and throw away your medicines at www.disposemymeds.org.          This list is accurate as of 8/10/18  3:38 PM.  Always use your most recent med list.                   Brand Name Dispense Instructions for use Diagnosis    albuterol 108 (90 Base) MCG/ACT inhaler    PROAIR HFA/PROVENTIL HFA/VENTOLIN HFA    1 Inhaler    Inhale 2 puffs into the lungs every 6 hours    Mild intermittent asthma in adult without complication       FLUoxetine 40 MG capsule    PROzac    90 capsule    Take 1 capsule (40 mg) by mouth daily    MILA (generalized anxiety disorder)       fluticasone 110 MCG/ACT Inhaler    FLOVENT HFA    1 Inhaler    Inhale 2 puffs into the lungs 2 times daily    Moderate persistent asthma with exacerbation       IMURAN PO      Take 50 mg by mouth 2 times daily        phentermine 30 MG capsule     30 capsule    Take 1 capsule (30 mg) by mouth every morning    Obesity, morbid, BMI 40.0-49.9 (H)       REMICADE IV      Inject 500 mg into the vein Every 8 weeks        VISTARIL PO      Take 50 mg by mouth once

## 2018-08-11 ASSESSMENT — ANXIETY QUESTIONNAIRES: GAD7 TOTAL SCORE: 6

## 2018-08-15 DIAGNOSIS — E66.01 OBESITY, MORBID, BMI 40.0-49.9 (H): ICD-10-CM

## 2018-08-15 RX ORDER — PHENTERMINE HYDROCHLORIDE 30 MG/1
30 CAPSULE ORAL EVERY MORNING
Qty: 30 CAPSULE | Refills: 0 | Status: CANCELLED | OUTPATIENT
Start: 2018-08-15

## 2018-08-15 NOTE — TELEPHONE ENCOUNTER
Not due for a refill.     phentermine 30 MG capsule was filled on 8/10/2018, qty 30 with 0 refills.

## 2018-09-08 DIAGNOSIS — E66.01 OBESITY, MORBID, BMI 40.0-49.9 (H): ICD-10-CM

## 2018-09-08 NOTE — TELEPHONE ENCOUNTER
Requested Prescriptions   Pending Prescriptions Disp Refills     phentermine 30 MG capsule  Last Written Prescription Date:  8/10/18  Last Fill Quantity: 30,  # refills: 0   Last office visit: 8/10/2018 with prescribing provider:  8/10/18   Future Office Visit:   Next 5 appointments (look out 90 days)     Sep 14, 2018  3:20 PM CDT   [facility]    [address] [place] [id number] [phone]                  30 capsule 0     Sig: Take 1 capsule (30 mg) by mouth every morning    There is no refill protocol information for this order

## 2018-09-11 NOTE — TELEPHONE ENCOUNTER
Routing refill request to provider for review/approval because:  Drug not on the [facility], [facility] or [facility] refill protocol or controlled substance    Samia Healy RN - Triage  [facility]

## 2018-09-12 ENCOUNTER — MYC MEDICAL ADVICE (OUTPATIENT)
Dept: PEDIATRICS | Facility: CLINIC | Age: 28
End: 2018-09-12

## 2018-09-12 DIAGNOSIS — F41.1 GAD (GENERALIZED ANXIETY DISORDER): ICD-10-CM

## 2018-09-12 RX ORDER — PHENTERMINE HYDROCHLORIDE 30 MG/1
30 CAPSULE ORAL EVERY MORNING
Qty: 30 CAPSULE | Refills: 0 | Status: SHIPPED | OUTPATIENT
Start: 2018-09-12 | End: 2018-11-06

## 2018-09-12 NOTE — TELEPHONE ENCOUNTER
"Mild single current episode of major depressive disorder (H)  \"increase prozac to 80mg - will follow up in 1 month with phentermine check\"    Next 5 appointments (look out 90 days)     Sep 14, 2018  3:20 PM CDT   SHORT with Bibi Gamble MD   [facility]    [address] [place] [id number]   [phone]                T'd up, patient will be out before appointment.  Lorena Guzman RN  Message handled by Nurse Triage.     "

## 2018-09-12 NOTE — TELEPHONE ENCOUNTER
Ok with me - seeing her later this week.    To covering provider to sign/print.    Bibi Gamble MD  Internal Medicine/Pediatrics  [facility]

## 2018-09-13 RX ORDER — FLUOXETINE 40 MG/1
80 CAPSULE ORAL DAILY
Qty: 180 CAPSULE | Refills: 1 | Status: SHIPPED | OUTPATIENT
Start: 2018-09-13 | End: 2019-02-01

## 2018-09-13 NOTE — TELEPHONE ENCOUNTER
Rx walked to [facility].  My chart message sent to patient letting her know Rx was brought to pharmacy.    Emilie Walsh MA

## 2018-09-14 ENCOUNTER — OFFICE VISIT (OUTPATIENT)
Dept: PEDIATRICS | Facility: CLINIC | Age: 28
End: 2018-09-14
Payer: COMMERCIAL

## 2018-09-14 VITALS
OXYGEN SATURATION: 99 % | WEIGHT: 268 LBS | HEIGHT: 64 IN | BODY MASS INDEX: 45.75 KG/M2 | TEMPERATURE: 98.3 F | SYSTOLIC BLOOD PRESSURE: 120 MMHG | HEART RATE: 87 BPM | DIASTOLIC BLOOD PRESSURE: 58 MMHG

## 2018-09-14 DIAGNOSIS — F32.0 MILD SINGLE CURRENT EPISODE OF MAJOR DEPRESSIVE DISORDER (H): ICD-10-CM

## 2018-09-14 DIAGNOSIS — Z23 NEED FOR PROPHYLACTIC VACCINATION AND INOCULATION AGAINST INFLUENZA: ICD-10-CM

## 2018-09-14 DIAGNOSIS — R00.2 PALPITATIONS: ICD-10-CM

## 2018-09-14 DIAGNOSIS — E66.01 OBESITY, MORBID, BMI 40.0-49.9 (H): Primary | ICD-10-CM

## 2018-09-14 PROCEDURE — 99214 OFFICE O/P EST MOD 30 MIN: CPT | Mod: 25 | Performed by: PEDIATRICS

## 2018-09-14 PROCEDURE — 90686 IIV4 VACC NO PRSV 0.5 ML IM: CPT | Performed by: PEDIATRICS

## 2018-09-14 PROCEDURE — 90471 IMMUNIZATION ADMIN: CPT | Performed by: PEDIATRICS

## 2018-09-14 RX ORDER — PHENTERMINE HYDROCHLORIDE 15 MG/1
15 CAPSULE ORAL EVERY MORNING
Qty: 30 CAPSULE | Refills: 0 | Status: SHIPPED | OUTPATIENT
Start: 2018-09-14 | End: 2019-07-12

## 2018-09-14 ASSESSMENT — ANXIETY QUESTIONNAIRES
7. FEELING AFRAID AS IF SOMETHING AWFUL MIGHT HAPPEN: SEVERAL DAYS
5. BEING SO RESTLESS THAT IT IS HARD TO SIT STILL: NOT AT ALL
GAD7 TOTAL SCORE: 6
1. FEELING NERVOUS, ANXIOUS, OR ON EDGE: SEVERAL DAYS
3. WORRYING TOO MUCH ABOUT DIFFERENT THINGS: SEVERAL DAYS
2. NOT BEING ABLE TO STOP OR CONTROL WORRYING: SEVERAL DAYS
6. BECOMING EASILY ANNOYED OR IRRITABLE: SEVERAL DAYS
IF YOU CHECKED OFF ANY PROBLEMS ON THIS QUESTIONNAIRE, HOW DIFFICULT HAVE THESE PROBLEMS MADE IT FOR YOU TO DO YOUR WORK, TAKE CARE OF THINGS AT HOME, OR GET ALONG WITH OTHER PEOPLE: SOMEWHAT DIFFICULT

## 2018-09-14 ASSESSMENT — PATIENT HEALTH QUESTIONNAIRE - PHQ9: 5. POOR APPETITE OR OVEREATING: SEVERAL DAYS

## 2018-09-14 NOTE — PROGRESS NOTES
"  SUBJECTIVE:   Kate Uribe is a 28 year old female who presents to clinic today for the following health issues:      Medication Followup of Prozac and Phentermine.    Taking Medication as prescribed: Yes    Side Effects:  Heart palpitations with phentermine.    Medication Helping Symptoms:  yes       Patient started having intense heart palpitations a week ago.  Stopped the phentermine for 4 days, then restarted today. Still had palpitations on the days she was not taking the medication.    Great weight loss.:    Wt Readings from Last 3 Encounters:   09/14/18 268 lb (121.6 kg)   08/10/18 277 lb (125.6 kg)   07/06/18 287 lb 12.8 oz (130.5 kg)     Feels that increase prozac dose is doing very well.    Problem list and histories reviewed & adjusted, as indicated.  Additional history: as documented      Reviewed and updated as needed this visit by clinical staff       Reviewed and updated as needed this visit by Provider         ROS:  Constitutional, HEENT, cardiovascular, pulmonary, gi and gu systems are negative, except as otherwise noted.    OBJECTIVE:     /58 (BP Location: Right arm, Cuff Size: Adult Large)  Pulse 87  Temp 98.3  F (36.8  C) (Oral)  Ht 5' 4\" (1.626 m)  Wt 268 lb (121.6 kg)  SpO2 99%  BMI 46 kg/m2  Body mass index is 46 kg/(m^2).  GENERAL APPEARANCE: healthy, alert and no distress  CV: regular rates and rhythm, normal S1 S2, no S3 or S4 and no murmur, click or rub    Diagnostic Test Results:  none     ASSESSMENT/PLAN:       1. Obesity, morbid, BMI 40.0-49.9 (H)  Due to palpitations will decrease back to 15mg.  Patient has already met 3 months goal of 5% weight loss - this medication works well for patient and will be ok to continue if the palpitations susi at the lower dose.   - phentermine 15 MG capsule; Take 1 capsule (15 mg) by mouth every morning  Dispense: 30 capsule; Refill: 0    2. Need for prophylactic vaccination and inoculation against influenza  - FLU VACCINE, SPLIT " VIRUS, IM (QUADRIVALENT) [62201]- >3 YRS    3. Palpitations  Likely from the phentermine - half life is 20 hours, so was never fully out of her system this week.  If happen again stop for an entire week (7 days)    4. Mild single current episode of major depressive disorder (H)  Controlled.      Patient Instructions   Follow-up 1 month.    Decrease to 15mg.          Bibi Gamble MD  [facility]    Injectable Influenza Immunization Documentation    1.  Is the person to be vaccinated sick today?   No    2. Does the person to be vaccinated have an allergy to a component   of the vaccine?   No  Egg Allergy Algorithm Link    3. Has the person to be vaccinated ever had a serious reaction   to influenza vaccine in the past?   No    4. Has the person to be vaccinated ever had Guillain-Barré syndrome?   No    Form completed by Emilie Walsh MA

## 2018-09-14 NOTE — MR AVS SNAPSHOT
After Visit Summary   9/14/2018    Kate Uribe    MRN: [id number]           Patient Information     Date Of Birth          1990        Visit Information        Provider Department      9/14/2018 3:20 PM Bibi Gamble MD [facility]        Today's Diagnoses     Need for prophylactic vaccination and inoculation against influenza    -  1    Obesity, morbid, BMI 40.0-49.9 (H)          Care Instructions    Follow-up 1 month.    Decrease to 15mg.              Follow-ups after your visit        Follow-up notes from your care team     Return in about 1 month (around 10/14/2018) for Routine Visit.      Who to contact     If you have questions or need follow up information about today's clinic visit or your schedule please contact [facility] directly at [phone].  Normal or non-critical lab and imaging results will be communicated to you by MyChart, letter or phone within 4 business days after the clinic has received the results. If you do not hear from us within 7 days, please contact the clinic through [employer] or phone. If you have a critical or abnormal lab result, we will notify you by phone as soon as possible.  Submit refill requests through [employer] or call your pharmacy and they will forward the refill request to us. Please allow 3 business days for your refill to be completed.          Additional Information About Your Visit        MyChart Information     [employer] gives you secure access to your electronic health record. If you see a primary care provider, you can also send messages to your care team and make appointments. If you have questions, please call your primary care clinic.  If you do not have a primary care provider, please call [phone] and they will assist you.        Care EveryWhere ID     This is your Care EveryWhere ID. This could be used by other organizations to access your [place] medical records  [id number]        Your Vitals Were      Pulse Temperature Pulse Oximetry BMI (Body Mass Index)          87 98.3  F (36.8  C) (Oral) 99% 46 kg/m2         Blood Pressure from Last 3 Encounters:   09/14/18 120/58   08/10/18 118/68   07/06/18 110/66    Weight from Last 3 Encounters:   09/14/18 268 lb (121.6 kg)   08/10/18 277 lb (125.6 kg)   07/06/18 287 lb 12.8 oz (130.5 kg)              We Performed the Following     FLU VACCINE, SPLIT VIRUS, IM (QUADRIVALENT) [21607]- >3 YRS          Today's Medication Changes          These changes are accurate as of 9/14/18  3:42 PM.  If you have any questions, ask your nurse or doctor.               These medicines have changed or have updated prescriptions.        Dose/Directions    * phentermine 30 MG capsule   This may have changed:  Another medication with the same name was added. Make sure you understand how and when to take each.   Used for:  Obesity, morbid, BMI 40.0-49.9 (H)   Changed by:  Bibi Gamble MD        Dose:  30 mg   Take 1 capsule (30 mg) by mouth every morning   Quantity:  30 capsule   Refills:  0       * phentermine 15 MG capsule   This may have changed:  You were already taking a medication with the same name, and this prescription was added. Make sure you understand how and when to take each.   Used for:  Obesity, morbid, BMI 40.0-49.9 (H)   Changed by:  Bibi Gamble MD        Dose:  15 mg   Take 1 capsule (15 mg) by mouth every morning   Quantity:  30 capsule   Refills:  0       * Notice:  This list has 2 medication(s) that are the same as other medications prescribed for you. Read the directions carefully, and ask your doctor or other care provider to review them with you.         Where to get your medicines      Some of these will need a paper prescription and others can be bought over the counter.  Ask your nurse if you have questions.     Bring a paper prescription for each of these medications     phentermine 15 MG capsule                Primary Care Provider Office Phone #  Fax #    Bibi Gamble [phone] [address] [place] [address]        Equal Access to Services     CHRISTINE CHEN : Hadii pancho agudelo sourav Aguayo, wawoodrowda mikyciera, qakatrinata kaidaliada melissa, rosendo trinidad lavedamichaela amisha. So [facility] [phone].    ATENCIÓN: Si habla español, tiene a sandhu disposición servicios gratuitos de asistencia lingüística. Llame al [phone].    We comply with applicable federal civil rights laws and [place] laws. We do not discriminate on the basis of race, color, national origin, age, disability, sex, sexual orientation, or gender identity.            Thank you!     Thank you for choosing [facility]  for your care. Our goal is always to provide you with excellent care. Hearing back from our patients is one way we can continue to improve our services. Please take a few minutes to complete the written survey that you may receive in the mail after your visit with us. Thank you!             Your Updated Medication List - Protect others around you: Learn how to safely use, store and throw away your medicines at www.disposemymeds.org.          This list is accurate as of 9/14/18  3:42 PM.  Always use your most recent med list.                   Brand Name Dispense Instructions for use Diagnosis    albuterol 108 (90 Base) MCG/ACT inhaler    PROAIR HFA/PROVENTIL HFA/VENTOLIN HFA    1 Inhaler    Inhale 2 puffs into the lungs every 6 hours    Mild intermittent asthma in adult without complication       FLUoxetine 40 MG capsule    PROzac    180 capsule    Take 2 capsules (80 mg) by mouth daily    MILA (generalized anxiety disorder)       fluticasone 110 MCG/ACT Inhaler    FLOVENT HFA    1 Inhaler    Inhale 2 puffs into the lungs 2 times daily    Moderate persistent asthma with exacerbation       IMURAN PO      Take 50 mg by mouth 2 times daily        * phentermine 30 MG capsule     30 capsule    Take 1 capsule (30 mg) by mouth every  morning    Obesity, morbid, BMI 40.0-49.9 (H)       * phentermine 15 MG capsule     30 capsule    Take 1 capsule (15 mg) by mouth every morning    Obesity, morbid, BMI 40.0-49.9 (H)       REMICADE IV      Inject 500 mg into the vein Every 8 weeks        VISTARIL PO      Take 50 mg by mouth once        * Notice:  This list has 2 medication(s) that are the same as other medications prescribed for you. Read the directions carefully, and ask your doctor or other care provider to review them with you.

## 2018-09-15 ASSESSMENT — PATIENT HEALTH QUESTIONNAIRE - PHQ9: SUM OF ALL RESPONSES TO PHQ QUESTIONS 1-9: 0

## 2018-09-15 ASSESSMENT — ANXIETY QUESTIONNAIRES: GAD7 TOTAL SCORE: 6

## 2018-09-23 ENCOUNTER — HOSPITAL ENCOUNTER (EMERGENCY)
Facility: CLINIC | Age: 28
Discharge: HOME | End: 2018-09-23
Attending: EMERGENCY MEDICINE | Admitting: EMERGENCY MEDICINE
Payer: COMMERCIAL

## 2018-09-23 ENCOUNTER — APPOINTMENT (OUTPATIENT)
Dept: CT IMAGING | Facility: CLINIC | Age: 28
End: 2018-09-23
Attending: EMERGENCY MEDICINE
Payer: COMMERCIAL

## 2018-09-23 ENCOUNTER — HOSPITAL ENCOUNTER (EMERGENCY)
Facility: CLINIC | Age: 28
Discharge: ANOTHER HOSPITAL | End: 2018-09-23
Attending: EMERGENCY MEDICINE | Admitting: EMERGENCY MEDICINE
Payer: COMMERCIAL

## 2018-09-23 ENCOUNTER — APPOINTMENT (OUTPATIENT)
Dept: ULTRASOUND IMAGING | Facility: CLINIC | Age: 28
End: 2018-09-23
Attending: PHYSICIAN ASSISTANT
Payer: COMMERCIAL

## 2018-09-23 VITALS
OXYGEN SATURATION: 95 % | DIASTOLIC BLOOD PRESSURE: 55 MMHG | HEART RATE: 81 BPM | RESPIRATION RATE: 14 BRPM | HEIGHT: 64 IN | TEMPERATURE: 97.9 F | SYSTOLIC BLOOD PRESSURE: 127 MMHG | BODY MASS INDEX: 45.24 KG/M2 | WEIGHT: 265 LBS

## 2018-09-23 VITALS
RESPIRATION RATE: 18 BRPM | SYSTOLIC BLOOD PRESSURE: 135 MMHG | OXYGEN SATURATION: 96 % | TEMPERATURE: 97.7 F | DIASTOLIC BLOOD PRESSURE: 69 MMHG

## 2018-09-23 DIAGNOSIS — R10.84 ABDOMINAL PAIN, GENERALIZED: ICD-10-CM

## 2018-09-23 DIAGNOSIS — K50.118 CROHN'S COLITIS, OTHER COMPLICATION (H): ICD-10-CM

## 2018-09-23 LAB
ALBUMIN SERPL-MCNC: 3.8 G/DL (ref 3.4–5)
ALBUMIN SERPL-MCNC: 3.8 G/DL (ref 3.4–5)
ALBUMIN UR-MCNC: NEGATIVE MG/DL
ALP SERPL-CCNC: 99 U/L (ref 40–150)
ALP SERPL-CCNC: 99 U/L (ref 40–150)
ALT SERPL W P-5'-P-CCNC: 22 U/L (ref 0–50)
ALT SERPL W P-5'-P-CCNC: 24 U/L (ref 0–50)
ANION GAP SERPL CALCULATED.3IONS-SCNC: 6 MMOL/L (ref 3–14)
ANION GAP SERPL CALCULATED.3IONS-SCNC: 6 MMOL/L (ref 3–14)
APPEARANCE UR: ABNORMAL
AST SERPL W P-5'-P-CCNC: 17 U/L (ref 0–45)
AST SERPL W P-5'-P-CCNC: 19 U/L (ref 0–45)
BASOPHILS # BLD AUTO: 0 10E9/L (ref 0–0.2)
BASOPHILS NFR BLD AUTO: 0.3 %
BILIRUB SERPL-MCNC: 0.3 MG/DL (ref 0.2–1.3)
BILIRUB SERPL-MCNC: 0.3 MG/DL (ref 0.2–1.3)
BILIRUB UR QL STRIP: NEGATIVE
BUN SERPL-MCNC: 10 MG/DL (ref 7–30)
BUN SERPL-MCNC: 14 MG/DL (ref 7–30)
CALCIUM SERPL-MCNC: 9 MG/DL (ref 8.5–10.1)
CALCIUM SERPL-MCNC: 9.2 MG/DL (ref 8.5–10.1)
CHLORIDE SERPL-SCNC: 103 MMOL/L (ref 94–109)
CHLORIDE SERPL-SCNC: 105 MMOL/L (ref 94–109)
CO2 SERPL-SCNC: 26 MMOL/L (ref 20–32)
CO2 SERPL-SCNC: 28 MMOL/L (ref 20–32)
COLOR UR AUTO: YELLOW
CREAT SERPL-MCNC: 0.71 MG/DL (ref 0.52–1.04)
CREAT SERPL-MCNC: 0.74 MG/DL (ref 0.52–1.04)
DIFFERENTIAL METHOD BLD: ABNORMAL
EOSINOPHIL # BLD AUTO: 0.1 10E9/L (ref 0–0.7)
EOSINOPHIL NFR BLD AUTO: 1.3 %
ERYTHROCYTE [DISTWIDTH] IN BLOOD BY AUTOMATED COUNT: 14.1 % (ref 10–15)
GFR SERPL CREATININE-BSD FRML MDRD: >90 ML/MIN/1.7M2
GFR SERPL CREATININE-BSD FRML MDRD: >90 ML/MIN/1.7M2
GLUCOSE SERPL-MCNC: 100 MG/DL (ref 70–99)
GLUCOSE SERPL-MCNC: 101 MG/DL (ref 70–99)
GLUCOSE UR STRIP-MCNC: NEGATIVE MG/DL
HCG UR QL: NEGATIVE
HCT VFR BLD AUTO: 40.9 % (ref 35–47)
HGB BLD-MCNC: 12.9 G/DL (ref 11.7–15.7)
HGB UR QL STRIP: ABNORMAL
IMM GRANULOCYTES # BLD: 0.1 10E9/L (ref 0–0.4)
IMM GRANULOCYTES NFR BLD: 0.5 %
KETONES UR STRIP-MCNC: NEGATIVE MG/DL
LACTATE BLD-SCNC: 0.7 MMOL/L (ref 0.7–2)
LEUKOCYTE ESTERASE UR QL STRIP: NEGATIVE
LIPASE SERPL-CCNC: 136 U/L (ref 73–393)
LIPASE SERPL-CCNC: 157 U/L (ref 73–393)
LYMPHOCYTES # BLD AUTO: 1.4 10E9/L (ref 0.8–5.3)
LYMPHOCYTES NFR BLD AUTO: 12.7 %
MCH RBC QN AUTO: 28.1 PG (ref 26.5–33)
MCHC RBC AUTO-ENTMCNC: 31.5 G/DL (ref 31.5–36.5)
MCV RBC AUTO: 89 FL (ref 78–100)
MONOCYTES # BLD AUTO: 0.7 10E9/L (ref 0–1.3)
MONOCYTES NFR BLD AUTO: 6 %
MUCOUS THREADS #/AREA URNS LPF: PRESENT /LPF
NEUTROPHILS # BLD AUTO: 8.8 10E9/L (ref 1.6–8.3)
NEUTROPHILS NFR BLD AUTO: 79.2 %
NITRATE UR QL: NEGATIVE
NRBC # BLD AUTO: 0 10*3/UL
NRBC BLD AUTO-RTO: 0 /100
PH UR STRIP: 5 PH (ref 5–7)
PLATELET # BLD AUTO: 245 10E9/L (ref 150–450)
POTASSIUM SERPL-SCNC: 3.9 MMOL/L (ref 3.4–5.3)
POTASSIUM SERPL-SCNC: 4.1 MMOL/L (ref 3.4–5.3)
PROT SERPL-MCNC: 7.8 G/DL (ref 6.8–8.8)
PROT SERPL-MCNC: 7.9 G/DL (ref 6.8–8.8)
RBC # BLD AUTO: 4.59 10E12/L (ref 3.8–5.2)
RBC #/AREA URNS AUTO: 3 /HPF (ref 0–2)
SODIUM SERPL-SCNC: 137 MMOL/L (ref 133–144)
SODIUM SERPL-SCNC: 137 MMOL/L (ref 133–144)
SOURCE: ABNORMAL
SP GR UR STRIP: 1.02 (ref 1–1.03)
SQUAMOUS #/AREA URNS AUTO: 6 /HPF (ref 0–1)
UROBILINOGEN UR STRIP-MCNC: 0 MG/DL (ref 0–2)
WBC # BLD AUTO: 11.1 10E9/L (ref 4–11)
WBC #/AREA URNS AUTO: 1 /HPF (ref 0–5)

## 2018-09-23 PROCEDURE — 81025 URINE PREGNANCY TEST: CPT | Performed by: EMERGENCY MEDICINE

## 2018-09-23 PROCEDURE — 25000128 H RX IP 250 OP 636: Performed by: EMERGENCY MEDICINE

## 2018-09-23 PROCEDURE — 96375 TX/PRO/DX INJ NEW DRUG ADDON: CPT

## 2018-09-23 PROCEDURE — 96361 HYDRATE IV INFUSION ADD-ON: CPT

## 2018-09-23 PROCEDURE — 83690 ASSAY OF LIPASE: CPT | Performed by: EMERGENCY MEDICINE

## 2018-09-23 PROCEDURE — 85025 COMPLETE CBC W/AUTO DIFF WBC: CPT | Performed by: PHYSICIAN ASSISTANT

## 2018-09-23 PROCEDURE — 83605 ASSAY OF LACTIC ACID: CPT | Performed by: EMERGENCY MEDICINE

## 2018-09-23 PROCEDURE — 99285 EMERGENCY DEPT VISIT HI MDM: CPT | Mod: 25

## 2018-09-23 PROCEDURE — 80053 COMPREHEN METABOLIC PANEL: CPT | Performed by: EMERGENCY MEDICINE

## 2018-09-23 PROCEDURE — 99284 EMERGENCY DEPT VISIT MOD MDM: CPT | Mod: 25

## 2018-09-23 PROCEDURE — 96376 TX/PRO/DX INJ SAME DRUG ADON: CPT

## 2018-09-23 PROCEDURE — 96374 THER/PROPH/DIAG INJ IV PUSH: CPT

## 2018-09-23 PROCEDURE — 25000128 H RX IP 250 OP 636: Performed by: PHYSICIAN ASSISTANT

## 2018-09-23 PROCEDURE — 76705 ECHO EXAM OF ABDOMEN: CPT

## 2018-09-23 PROCEDURE — 81001 URINALYSIS AUTO W/SCOPE: CPT | Performed by: EMERGENCY MEDICINE

## 2018-09-23 PROCEDURE — 74177 CT ABD & PELVIS W/CONTRAST: CPT

## 2018-09-23 PROCEDURE — 25000132 ZZH RX MED GY IP 250 OP 250 PS 637: Performed by: PHYSICIAN ASSISTANT

## 2018-09-23 PROCEDURE — 83690 ASSAY OF LIPASE: CPT | Performed by: PHYSICIAN ASSISTANT

## 2018-09-23 PROCEDURE — 25000125 ZZHC RX 250: Performed by: PHYSICIAN ASSISTANT

## 2018-09-23 PROCEDURE — 80053 COMPREHEN METABOLIC PANEL: CPT | Performed by: PHYSICIAN ASSISTANT

## 2018-09-23 PROCEDURE — 96374 THER/PROPH/DIAG INJ IV PUSH: CPT | Mod: 59

## 2018-09-23 RX ORDER — METHYLPREDNISOLONE SODIUM SUCCINATE 40 MG/ML
30 INJECTION, POWDER, LYOPHILIZED, FOR SOLUTION INTRAMUSCULAR; INTRAVENOUS ONCE
Status: COMPLETED | OUTPATIENT
Start: 2018-09-23 | End: 2018-09-23

## 2018-09-23 RX ORDER — HYDROMORPHONE HYDROCHLORIDE 1 MG/ML
0.5 INJECTION, SOLUTION INTRAMUSCULAR; INTRAVENOUS; SUBCUTANEOUS
Status: COMPLETED | OUTPATIENT
Start: 2018-09-23 | End: 2018-09-23

## 2018-09-23 RX ORDER — IOPAMIDOL 755 MG/ML
500 INJECTION, SOLUTION INTRAVASCULAR ONCE
Status: COMPLETED | OUTPATIENT
Start: 2018-09-23 | End: 2018-09-23

## 2018-09-23 RX ORDER — KETOROLAC TROMETHAMINE 15 MG/ML
15 INJECTION, SOLUTION INTRAMUSCULAR; INTRAVENOUS ONCE
Status: COMPLETED | OUTPATIENT
Start: 2018-09-23 | End: 2018-09-23

## 2018-09-23 RX ORDER — ONDANSETRON 2 MG/ML
4 INJECTION INTRAMUSCULAR; INTRAVENOUS EVERY 30 MIN PRN
Status: DISCONTINUED | OUTPATIENT
Start: 2018-09-23 | End: 2018-09-23 | Stop reason: HOSPADM

## 2018-09-23 RX ORDER — ONDANSETRON 2 MG/ML
4 INJECTION INTRAMUSCULAR; INTRAVENOUS EVERY 30 MIN PRN
Status: DISCONTINUED | OUTPATIENT
Start: 2018-09-23 | End: 2018-09-23

## 2018-09-23 RX ORDER — ONDANSETRON 2 MG/ML
4 INJECTION INTRAMUSCULAR; INTRAVENOUS ONCE
Status: COMPLETED | OUTPATIENT
Start: 2018-09-23 | End: 2018-09-23

## 2018-09-23 RX ORDER — SODIUM CHLORIDE 9 MG/ML
1000 INJECTION, SOLUTION INTRAVENOUS CONTINUOUS
Status: DISCONTINUED | OUTPATIENT
Start: 2018-09-23 | End: 2018-09-23 | Stop reason: HOSPADM

## 2018-09-23 RX ORDER — ONDANSETRON 4 MG/1
4 TABLET, ORALLY DISINTEGRATING ORAL EVERY 8 HOURS PRN
Qty: 10 TABLET | Refills: 0 | Status: SHIPPED | OUTPATIENT
Start: 2018-09-23 | End: 2018-09-26

## 2018-09-23 RX ORDER — OXYCODONE HYDROCHLORIDE 5 MG/1
5-10 TABLET ORAL EVERY 6 HOURS PRN
Qty: 12 TABLET | Refills: 0 | Status: SHIPPED | OUTPATIENT
Start: 2018-09-23 | End: 2018-12-07

## 2018-09-23 RX ADMIN — Medication 0.5 MG: at 11:33

## 2018-09-23 RX ADMIN — Medication 0.5 MG: at 08:19

## 2018-09-23 RX ADMIN — METHYLPREDNISOLONE SODIUM SUCCINATE 30 MG: 40 INJECTION, POWDER, FOR SOLUTION INTRAMUSCULAR; INTRAVENOUS at 10:14

## 2018-09-23 RX ADMIN — KETOROLAC TROMETHAMINE 15 MG: 15 INJECTION, SOLUTION INTRAMUSCULAR; INTRAVENOUS at 01:43

## 2018-09-23 RX ADMIN — SODIUM CHLORIDE 1000 ML: 9 INJECTION, SOLUTION INTRAVENOUS at 08:15

## 2018-09-23 RX ADMIN — SODIUM CHLORIDE 1000 ML: 9 INJECTION, SOLUTION INTRAVENOUS at 01:43

## 2018-09-23 RX ADMIN — IOPAMIDOL 100 ML: 755 INJECTION, SOLUTION INTRAVENOUS at 08:40

## 2018-09-23 RX ADMIN — ONDANSETRON 4 MG: 2 INJECTION INTRAMUSCULAR; INTRAVENOUS at 01:42

## 2018-09-23 RX ADMIN — ONDANSETRON 4 MG: 2 INJECTION INTRAMUSCULAR; INTRAVENOUS at 08:18

## 2018-09-23 RX ADMIN — Medication 0.5 MG: at 09:44

## 2018-09-23 RX ADMIN — LIDOCAINE HYDROCHLORIDE 30 ML: 20 SOLUTION ORAL; TOPICAL at 01:43

## 2018-09-23 RX ADMIN — Medication 0.5 MG: at 02:21

## 2018-09-23 RX ADMIN — SODIUM CHLORIDE 65 ML: 9 INJECTION, SOLUTION INTRAVENOUS at 08:41

## 2018-09-23 ASSESSMENT — ENCOUNTER SYMPTOMS
DYSURIA: 0
HEMATURIA: 0
ABDOMINAL PAIN: 1
DIARRHEA: 1
VOMITING: 0
DIARRHEA: 1
NAUSEA: 1
NAUSEA: 1
ABDOMINAL PAIN: 1
VOMITING: 1
SHORTNESS OF BREATH: 0

## 2018-09-23 NOTE — ED PROVIDER NOTES
History     Chief Complaint:  Abdominal Pain    HPI   Kate Uribe is a 28 year old female with a history of crohn's disease and GERD who presents to the ED for evaluation of abdominal pain. The patient reports that she developed some diffuse abdominal pain around 1800 this evening after eating dinner, with occassional episodes of a sharp, intense mid abdominal pain. Her pain has no alleviating or exacerbating factors. She additionally had an episode of vomiting with nausea and some recent loose stools, so she presented to the ED for evaluation. Here in the ED, she denies any chest pain, shortness of breath, vaginal bleeding, or urinary symptoms. She adds that her pain while here became more epigastric. She has not eaten since her pain began. She notes that her pain today is not similar to that of her Crohn's. Of note, she is currently on the keto diet. She does not drink alcohol frequently or use ibuprofen or aspirin. No prior episodes of similar symptoms.    Allergies:  Penicillins    Medications:    Albuterol  Imuran  Prozac  Flovent  Vistaril  Remicade  Phentermine    Past Medical History:    Anemia  Asthma  Crohn's disease  Heart murmur  Noninfectious ileitis  Preeclampsia  GERD  Mental disorder  Depression    Past Surgical History:    Breast surgery   section    Family History:    Diabetes  HTN  Hyperlipidemia  Obesity  Gallbladder disease  Mental illness  Thyroid disease    Social History:  Marital Status:   [2]  Negative for tobacco use.  Negative for alcohol use.    Review of Systems   Respiratory: Negative for shortness of breath.    Cardiovascular: Negative for chest pain.   Gastrointestinal: Positive for abdominal pain, diarrhea, nausea and vomiting.   All other systems reviewed and are negative.    Physical Exam     Patient Vitals for the past 24 hrs:   BP Temp Temp src Pulse Resp SpO2 Height Weight   18 0230 (!) 118/39 - - - - 92 % - -   18 0224 - - - - - 97 % - -  "  09/23/18 0218 112/60 - - - - 95 % - -   09/23/18 0200 125/64 - - - - 94 % - -   09/23/18 0159 - - - - - 95 % - -   09/23/18 0148 - - - - - 95 % - -   09/23/18 0146 124/73 - - - - - - -   09/23/18 0015 126/58 97.9  F (36.6  C) Oral 81 14 98 % 1.626 m (5' 4\") 120.2 kg (265 lb)     Physical Exam  General: Uncomfortable appearing female.  Normal mentation.  Head:  Scalp is NC/AT  Eyes:  No scleral icterus, PERRL   ENT:  The external nose and ears are normal.  CV:  Regular rate and rhythm    No pathologic murmur, rubs, or gallops.  Resp:  Breath sounds are clear bilaterally.  No crackles, wheezes, rhonchi.    Non-labored, no retractions or accessory muscle use  GI:  Abdomen is soft, obese, no distension, tenderness to palpation in epigastric and right upper quadrant, otherwise nontender. No peritoneal signs.  Bowel sounds present in all quadrants.  :  No suprapubic or flank tenderness  MS:  No lower extremity edema   Skin:  Warm and dry, No rash or lesions noted.  Neuro: Oriented x 3. No gross motor deficits.    Emergency Department Course     Imaging:  Radiographic findings were communicated with the patient who voiced understanding of the findings.  US Abdomen, limited:  Other than a 4.9 cm hemangioma in the liver, unremarkable right upper quadrant ultrasound, as per radiology.     Laboratory:  CBC: WBC: 11.1 (H), HGB: 12.9, PLT: 245  CMP: Glucose 101 (H), o/w WNL (Creatinine: 0.74)    Lipase: 157    UA with Microscopic: blood small (A), SE 6 (H), mucous present (A), RBC 3 (H), o/w WNL  HCG: negative    Interventions:  0142 Zofran, 4 mg, IV injection  0143 GI Cocktail (Maalox/Mylanta and viscous Lidocaine), 30 mL suspension, PO    Toradol, 15 mg, IV injection   NS 1L IV  0221 Dilaudid, 0.5 mg, IV injection  Please see MAR for full list of medications administered in the ED.    Emergency Department Course:  Nursing notes and vitals reviewed. (0038) I performed an exam of the patient as documented above.     IV " inserted. Medicine administered as documented above. Blood drawn. This was sent to the lab for further testing, results above.    The patient provided a urine sample here in the emergency department. This was sent for laboratory testing, findings above.     The patient was sent for an Abdomen US while in the emergency department, findings above.     (0310) I rechecked the patient and discussed the results of her workup thus far.     Findings and plan explained to the Patient. Patient discharged home with instructions regarding supportive care, medications, and reasons to return. The importance of close follow-up was reviewed. The patient was prescribed Oxycodone & Zofran.    I personally reviewed the laboratory results with the Patient and answered all related questions prior to discharge.     Impression & Plan      Medical Decision Making:  Kate Uribe is a 28 year old female who presents with acute nausea, and epigastric abdominal pain.  Broad differential was considered.  She is afebrile with normal vitals.  She has epigastric and right upper quadrant tenderness, but otherwise has a benign abdominal exam without focal RLQ or LLQ tenderness to suggest diverticulitis or appendicitis.   Lab work including lipase and urinalysis as above relatively unremarkable.  Right upper quadrant ultrasound performed to evaluate for gallbladder pathology and demonstrated 4.9 cm hemangioma of liver but otherwise normal.  The patient is not pregnant.    Symptoms are improved after IV fluids and symptomatic treatment.  Uncertain the cause of her symptoms, though it is possible that this could be some discomfort related to her Crohn's disease as she did have some loose stools earlier.  No bloody stools.  No chest pain or shortness of breath.  No flank tenderness, very small amount of blood in urine along with associated squamous cells but doubt kidney stone.  Her repeat abdominal exam was improved.  Discussed with the patient  the option of undergoing CT scan versus conservative management and return if worsening symptoms, and the patient feels comfortable with going home at this time.  Will prescribe Zofran and oxycodone.  She will return if any new or worsening symptoms.      Diagnosis:    ICD-10-CM    1. Abdominal pain, generalized R10.84      Disposition:  discharged to home    Discharge Medications:  New Prescriptions    ONDANSETRON (ZOFRAN ODT) 4 MG ODT TAB    Take 1 tablet (4 mg) by mouth every 8 hours as needed    OXYCODONE IR (ROXICODONE) 5 MG TABLET    Take 1-2 tablets (5-10 mg) by mouth every 6 hours as needed for pain     Scribe Disclosure:  I, Mable Hernández, am serving as a scribe on 9/23/2018 at 12:26 AM to personally document services performed by Jerzy Perez PA-C based on my observations and the provider's statements to me.     Mable Hernández  9/23/2018   [facility] EMERGENCY DEPARTMENT       Jerzy Perez PA-C  09/23/18 0436

## 2018-09-23 NOTE — ED AVS SNAPSHOT
[facility] Emergency Department    [address]    [facility] [address]    Phone:  [phone]    Fax:  [phone]                                       Kate Uribe   MRN: [id number]    Department:  [facility] Emergency Department   Date of Visit:  9/23/2018           Patient Information     Date Of Birth          1990        Your diagnoses for this visit were:     Abdominal pain, generalized        You were seen by Teddy Stone MD.        Discharge Instructions       Please make an appointment to follow up with your primary care provider in 3-5 days if not improving.      Use tylenol and/or ibuprofen for pain or discomfort    Use Oxycodone for severe pain uncontrolled by above medications    Opioid Medication Information  You have been given a prescription for an opioid (narcotic) pain medicine and/or have received a pain medicine while here in the emergency department. These medicines can make you drowsy or impaired. You must not drive, operate dangerous equipment, or engage in any other dangerous activities while taking these medications. If you drive while taking these medications, you could be arrested for DUI, or driving under the influence. Do not drink any alcohol while you are taking these medications.   Opioid pain medications can cause addiction. If you have a history of chemical dependency of any type, you are at a higher risk of becoming addicted to pain medications. Only take these prescribed medications to treat your pain when all other options have been tried. Take it for as short a time and as few doses as possible. Store your pain pills in a secure place, as they are frequently stolen and provide a dangerous opportunity for children or visitors in your house to start abusing these powerful medications. We will not replace any lost or stolen medicine. As soon as your pain is better, you should flush all your remaining medication.    Many prescription pain medications contain Tylenol (acetaminophen), including Vicodin, Tylenol #3, Norco, Lortab, and Percocet. You should not take any extra pills of Tylenol if you are using these prescription medications or you can get very sick. Do not ever take more than 4000 mg of acetaminophen in any 24 hour period.  All opioids tend to cause constipation. Drink plenty of water and eat foods that have a lot of fiber, such as fruits, vegetables, prune juice, apple juice and high fiber cereal. Take a laxative if you don t move your bowels at least every other day. Miralax, Milk of Magnesia, Colace, or Senna can be used to keep you regular.            Zofran for nausea/vomiting      Discharge Instructions  Abdominal Pain    Abdominal pain (belly pain) can be caused by many things. Your evaluation today does not show the exact cause for your pain. Your provider today has decided that it is unlikely your pain is due to a life threatening problem, or a problem requiring surgery or hospital admission. Sometimes those problems cannot be found right away, so it is very important that you follow up as directed.  Sometimes only the changes which occur over time allow the cause of your pain to be found.    Generally, every Emergency Department visit should have a follow-up clinic visit with either a primary or a specialty clinic/provider. Please follow-up as instructed by your emergency provider today. With abdominal pain, we often recommend very close follow-up, such as the following day.    ADULTS:  Return to the Emergency Department right away if:      You get an oral temperature above 102oF or as directed by your provider.    You have blood in your stools. This may be bright red or appear as black, tarry stools.      You keep vomiting (throwing up) or cannot drink liquids.    You see blood when you vomit.     You cannot have a bowel movement or you cannot pass gas.    Your stomach gets bloated or bigger.    Your  skin or the whites of your eyes look yellow.    You faint.    You have bloody, frequent or painful urination (peeing).    You have new symptoms or anything that worries you.    CHILDREN:  Return to the Emergency Department right away if your child has any of the above-listed symptoms or the following:      Pushes your hand away or screams/cries when his/her belly is touched.    You notice your child is very fussy or weak.    Your child is very tired and is too tired to eat or drink.    Your child is dehydrated.  Signs of dehydration can be:  o Significant change in the amount of wet diapers/urine.  o Your infant or child starts to have dry mouth and lips, or no saliva (spit) or tears.    PREGNANT WOMEN:  Return to the Emergency Department right away if you have any of the above-listed symptoms or the following:      You have bleeding, leaking fluid or passing tissue from the vagina.    You have worse pain or cramping, or pain in your shoulder or back.    You have vomiting that will not stop.    You have a temperature of 100oF or more.    Your baby is not moving as much as usual.    You faint.    You get a bad headache with or without eye problems and abdominal pain.    You have a seizure.    You have unusual discharge from your vagina and abdominal pain.    Abdominal pain is pretty common during pregnancy.  Your pain may or may not be related to your pregnancy. You should follow-up closely with your OB provider so they can evaluate you and your baby.  Until you follow-up with your regular provider, do the following:       Avoid sex and do not put anything in your vagina.    Drink clear fluids.    Only take medications approved by your provider.    MORE INFORMATION:    Appendicitis:  A possible cause of abdominal pain in any person who still has their appendix is acute appendicitis. Appendicitis is often hard to diagnose.  Testing does not always rule out early appendicitis or other causes of abdominal pain. Close  "follow-up with your provider and re-evaluations may be needed to figure out the reason for your abdominal pain.    Follow-up:  It is very important that you make an appointment with your clinic and go to the appointment.  If you do not follow-up with your primary provider, it may result in missing an important development which could result in permanent injury or disability and/or lasting pain.  If there is any problem keeping your appointment, call your provider or return to the Emergency Department.    Medications:  Take your medications as directed by your provider today.  Before using over-the-counter medications, ask your provider and make sure to take the medications as directed.  If you have any questions about medications, ask your provider.    Diet:  Resume your normal diet as much as possible, but do not eat fried, fatty or spicy foods while you have pain.  Do not drink alcohol or have caffeine.  Do not smoke tobacco.    Probiotics: If you have been given an antibiotic, you may want to also take a probiotic pill or eat yogurt with live cultures. Probiotics have \"good bacteria\" to help your intestines stay healthy. Studies have shown that probiotics help prevent diarrhea (loose stools) and other intestine problems (including C. diff infection) when you take antibiotics. You can buy these without a prescription in the pharmacy section of the store.     If you were given a prescription for medicine here today, be sure to read all of the information (including the package insert) that comes with your prescription.  This will include important information about the medicine, its side effects, and any warnings that you need to know about.  The pharmacist who fills the prescription can provide more information and answer questions you may have about the medicine.  If you have questions or concerns that the pharmacist cannot address, please call or return to the Emergency Department.       Remember that you can " always come back to the Emergency Department if you are not able to see your regular provider in the amount of time listed above, if you get any new symptoms, or if there is anything that worries you.        Your next 10 appointments already scheduled     Oct 19, 2018  8:40 AM STEFANIA Kwok with Bibi Gamble MD   [facility] ([facility])    [address] [place] [address]  [address]  [facility] [address] [phone]              24 Hour Appointment Hotline       To make an appointment at any [facility], call [phone] ([phone]). If you don't have a family doctor or clinic, we will help you find one. [facility] are conveniently located to serve the needs of you and your family.             Review of your medicines      START taking        Dose / Directions Last dose taken    ondansetron 4 MG ODT tab   Commonly known as:  ZOFRAN ODT   Dose:  4 mg   Quantity:  10 tablet        Take 1 tablet (4 mg) by mouth every 8 hours as needed   Refills:  0        oxyCODONE IR 5 MG tablet   Commonly known as:  ROXICODONE   Dose:  5-10 mg   Quantity:  12 tablet        Take 1-2 tablets (5-10 mg) by mouth every 6 hours as needed for pain   Refills:  0          Our records show that you are taking the medicines listed below. If these are incorrect, please call your family doctor or clinic.        Dose / Directions Last dose taken    albuterol 108 (90 Base) MCG/ACT inhaler   Commonly known as:  PROAIR HFA/PROVENTIL HFA/VENTOLIN HFA   Dose:  2 puff   Quantity:  1 Inhaler        Inhale 2 puffs into the lungs every 6 hours   Refills:  0        FLUoxetine 40 MG capsule   Commonly known as:  PROzac   Dose:  80 mg   Quantity:  180 capsule        Take 2 capsules (80 mg) by mouth daily   Refills:  1        fluticasone 110 MCG/ACT Inhaler   Commonly known as:  FLOVENT HFA   Dose:  2 puff   Quantity:  1 Inhaler        Inhale 2 puffs into the lungs 2 times daily   Refills:  1        IMURAN  PO   Dose:  50 mg        Take 50 mg by mouth 2 times daily   Refills:  0        * phentermine 30 MG capsule   Dose:  30 mg   Quantity:  30 capsule        Take 1 capsule (30 mg) by mouth every morning   Refills:  0        * phentermine 15 MG capsule   Dose:  15 mg   Quantity:  30 capsule        Take 1 capsule (15 mg) by mouth every morning   Refills:  0        REMICADE IV   Dose:  500 mg        Inject 500 mg into the vein Every 8 weeks   Refills:  0        VISTARIL PO   Dose:  50 mg   Indication:  rest        Take 50 mg by mouth once   Refills:  0        * Notice:  This list has 2 medication(s) that are the same as other medications prescribed for you. Read the directions carefully, and ask your doctor or other care provider to review them with you.            Information about OPIOIDS     PRESCRIPTION OPIOIDS: WHAT YOU NEED TO KNOW   We gave you an opioid (narcotic) pain medicine. It is important to manage your pain, but opioids are not always the best choice. You should first try all the other options your care team gave you. Take this medicine for as short a time (and as few doses) as possible.    Some activities can increase your pain, such as bandage changes or therapy sessions. It may help to take your pain medicine 30 to 60 minutes before these activities. Reduce your stress by getting enough sleep, working on hobbies you enjoy and practicing relaxation or meditation. Talk to your care team about ways to manage your pain beyond prescription opioids.    These medicines have risks:    DO NOT drive when on new or higher doses of pain medicine. These medicines can affect your alertness and reaction times, and you could be arrested for driving under the influence (DUI). If you need to use opioids long-term, talk to your care team about driving.    DO NOT operate heavy machinery    DO NOT do any other dangerous activities while taking these medicines.    DO NOT drink any alcohol while taking these medicines.     If  the opioid prescribed includes acetaminophen, DO NOT take with any other medicines that contain acetaminophen. Read all labels carefully. Look for the word  acetaminophen  or  Tylenol.  Ask your pharmacist if you have questions or are unsure.    You can get addicted to pain medicines, especially if you have a history of addiction (chemical, alcohol or substance dependence). Talk to your care team about ways to reduce this risk.    All opioids tend to cause constipation. Drink plenty of water and eat foods that have a lot of fiber, such as fruits, vegetables, prune juice, apple juice and high-fiber cereal. Take a laxative (Miralax, milk of magnesia, Colace, Senna) if you don t move your bowels at least every other day. Other side effects include upset stomach, sleepiness, dizziness, throwing up, tolerance (needing more of the medicine to have the same effect), physical dependence and slowed breathing.    Store your pills in a secure place, locked if possible. We will not replace any lost or stolen medicine. If you don t finish your medicine, please throw away (dispose) as directed by your pharmacist. The [place] [employer] has more information about safe disposal: https://www.pca.[email or web address]/living-[email or web address]/[email or web address]-medications        Prescriptions were sent or printed at these locations (2 Prescriptions)                   Other Prescriptions                Printed at Department/Unit printer (2 of 2)         ondansetron (ZOFRAN ODT) 4 MG ODT tab               oxyCODONE IR (ROXICODONE) 5 MG tablet                Procedures and tests performed during your visit     CBC with platelets differential    Comprehensive metabolic panel    HCG qualitative urine    Lipase    UA with Microscopic reflex to Culture    US Abdomen Limited      Orders Needing Specimen Collection     None      Pending Results     No orders found from 9/21/2018 to 9/24/2018.            Pending Culture Results     No orders found  from 9/21/2018 to 9/24/2018.            Pending Results Instructions     If you had any lab results that were not finalized at the time of your Discharge, you can call the ED Lab Result RN at [phone]. You will be contacted by this team for any positive Lab results or changes in treatment. The nurses are available 7 days a week from 10A to 6:30P.  You can leave a message 24 hours per day and they will return your call.        Test Results From Your Hospital Stay        9/23/2018 12:55 AM      Component Results     Component Value Ref Range & Units Status    Color Urine Yellow  Final    Appearance Urine Slightly Cloudy  Final    Glucose Urine Negative NEG^Negative mg/dL Final    Bilirubin Urine Negative NEG^Negative Final    Ketones Urine Negative NEG^Negative mg/dL Final    Specific Gravity Urine 1.020 1.003 - 1.035 Final    Blood Urine Small (A) NEG^Negative Final    pH Urine 5.0 5.0 - 7.0 pH Final    Protein Albumin Urine Negative NEG^Negative mg/dL Final    Urobilinogen mg/dL 0.0 0.0 - 2.0 mg/dL Final    Nitrite Urine Negative NEG^Negative Final    Leukocyte Esterase Urine Negative NEG^Negative Final    Source Midstream Urine  Final    WBC Urine 1 0 - 5 /HPF Final    RBC Urine 3 (H) 0 - 2 /HPF Final    Squamous Epithelial /HPF Urine 6 (H) 0 - 1 /HPF Final    Mucous Urine Present (A) NEG^Negative /LPF Final         9/23/2018 12:55 AM      Component Results     Component Value Ref Range & Units Status    HCG Qual Urine Negative NEG^Negative Final    This test is for screening purposes.  Results should be interpreted along with   the clinical picture.  Confirmation testing is available if warranted by   ordering [id number], HCG Quantitative Pregnancy.           9/23/2018  1:08 AM      Component Results     Component Value Ref Range & Units Status    WBC 11.1 (H) 4.0 - 11.0 10e9/L Final    RBC Count 4.59 3.8 - 5.2 10e12/L Final    Hemoglobin 12.9 11.7 - 15.7 g/dL Final    Hematocrit 40.9 35.0 - 47.0 % Final    MCV 89  78 - 100 fl Final    MCH 28.1 26.5 - 33.0 pg Final    MCHC 31.5 31.5 - 36.5 g/dL Final    RDW 14.1 10.0 - 15.0 % Final    Platelet Count 245 150 - 450 10e9/L Final    Diff Method Automated Method  Final    % Neutrophils 79.2 % Final    % Lymphocytes 12.7 % Final    % Monocytes 6.0 % Final    % Eosinophils 1.3 % Final    % Basophils 0.3 % Final    % Immature Granulocytes 0.5 % Final    Nucleated RBCs 0 0 /100 Final    Absolute Neutrophil 8.8 (H) 1.6 - 8.3 10e9/L Final    Absolute Lymphocytes 1.4 0.8 - 5.3 10e9/L Final    Absolute Monocytes 0.7 0.0 - 1.3 10e9/L Final    Absolute Eosinophils 0.1 0.0 - 0.7 10e9/L Final    Absolute Basophils 0.0 0.0 - 0.2 10e9/L Final    Abs Immature Granulocytes 0.1 0 - 0.4 10e9/L Final    Absolute Nucleated RBC 0.0  Final         9/23/2018  1:25 AM      Component Results     Component Value Ref Range & Units Status    Sodium 137 133 - 144 mmol/L Final    Potassium 3.9 3.4 - 5.3 mmol/L Final    Chloride 103 94 - 109 mmol/L Final    Carbon Dioxide 28 20 - 32 mmol/L Final    Anion Gap 6 3 - 14 mmol/L Final    Glucose 101 (H) 70 - 99 mg/dL Final    Urea Nitrogen 14 7 - 30 mg/dL Final    Creatinine 0.74 0.52 - 1.04 mg/dL Final    GFR Estimate >90 >60 mL/min/1.7m2 Final    Non  GFR Calc    GFR Estimate If Black >90 >60 mL/min/1.7m2 Final    African American GFR Calc    Calcium 9.2 8.5 - 10.1 mg/dL Final    Bilirubin Total 0.3 0.2 - 1.3 mg/dL Final    Albumin 3.8 3.4 - 5.0 g/dL Final    Protein Total 7.8 6.8 - 8.8 g/dL Final    Alkaline Phosphatase 99 40 - 150 U/L Final    ALT 24 0 - 50 U/L Final    AST 17 0 - 45 U/L Final         9/23/2018  1:25 AM      Component Results     Component Value Ref Range & Units Status    Lipase 157 73 - 393 U/L Final         9/23/2018  3:09 AM      Narrative     RIGHT UPPER QUADRANT ULTRASOUND 9/23/2018 3:02 AM    HISTORY:  Right upper quadrant pain.     COMPARISON: None.    FINDINGS:      Gallbladder:  Normal with no cholelithiasis, wall  thickening or focal  tenderness.      Bile ducts:   CHD is normal diameter.  No intrahepatic biliary  dilatation.    Liver:  Lobular area of uniform hyperechogenicity in the anterior  midline liver measures 4.8 x 4.9 x 1.9 cm. No other liver lesions. No  biliary dilation.    Pancreas:   Normal.     Right kidney:   Normal.         Impression     IMPRESSION:  Other than a 4.9 cm hemangioma in the liver, unremarkable  right upper quadrant ultrasound.    JASON RAMIREZ MD                Clinical Quality Measure: Blood Pressure Screening     Your blood pressure was checked while you were in the emergency department today. The last reading we obtained was  BP: (!) 118/39 . Please read the guidelines below about what these numbers mean and what you should do about them.  If your systolic blood pressure (the top number) is less than 120 and your diastolic blood pressure (the bottom number) is less than 80, then your blood pressure is normal. There is nothing more that you need to do about it.  If your systolic blood pressure (the top number) is 120-139 or your diastolic blood pressure (the bottom number) is 80-89, your blood pressure may be higher than it should be. You should have your blood pressure rechecked within a year by a primary care provider.  If your systolic blood pressure (the top number) is 140 or greater or your diastolic blood pressure (the bottom number) is 90 or greater, you may have high blood pressure. High blood pressure is treatable, but if left untreated over time it can put you at risk for heart attack, stroke, or kidney failure. You should have your blood pressure rechecked by a primary care provider within the next 4 weeks.  If your provider in the emergency department today gave you specific instructions to follow-up with your doctor or provider even sooner than that, you should follow that instruction and not wait for up to 4 weeks for your follow-up visit.        Thank you for choosing  [facility]       Thank you for choosing [facility] for your care. Our goal is always to provide you with excellent care. Hearing back from our patients is one way we can continue to improve our services. Please take a few minutes to complete the written survey that you may receive in the mail after you visit with us. Thank you!        [employer] Information     [employer] gives you secure access to your electronic health record. If you see a primary care provider, you can also send messages to your care team and make appointments. If you have questions, please call your primary care clinic.  If you do not have a primary care provider, please call [phone] and they will assist you.        Care EveryWhere ID     This is your Care EveryWhere ID. This could be used by other organizations to access your [facility] medical records  [id number]        Equal Access to Services     CHRISTINE CHEN : Holly Aguayo, izaiah solo, gordon torres, rosendo lion. So [facility] [phone].    ATENCIÓN: Si habla español, tiene a sandhu disposición servicios gratuitos de asistencia lingüística. Llame al [phone].    We comply with applicable federal civil rights laws and [place] laws. We do not discriminate on the basis of race, color, national origin, age, disability, sex, sexual orientation, or gender identity.            After Visit Summary       This is your record. Keep this with you and show to your community pharmacist(s) and doctor(s) at your next visit.

## 2018-09-23 NOTE — DISCHARGE INSTRUCTIONS
Please make an appointment to follow up with your primary care provider in 3-5 days if not improving.      Use tylenol and/or ibuprofen for pain or discomfort    Use Oxycodone for severe pain uncontrolled by above medications    Opioid Medication Information  You have been given a prescription for an opioid (narcotic) pain medicine and/or have received a pain medicine while here in the emergency department. These medicines can make you drowsy or impaired. You must not drive, operate dangerous equipment, or engage in any other dangerous activities while taking these medications. If you drive while taking these medications, you could be arrested for DUI, or driving under the influence. Do not drink any alcohol while you are taking these medications.   Opioid pain medications can cause addiction. If you have a history of chemical dependency of any type, you are at a higher risk of becoming addicted to pain medications. Only take these prescribed medications to treat your pain when all other options have been tried. Take it for as short a time and as few doses as possible. Store your pain pills in a secure place, as they are frequently stolen and provide a dangerous opportunity for children or visitors in your house to start abusing these powerful medications. We will not replace any lost or stolen medicine. As soon as your pain is better, you should flush all your remaining medication.   Many prescription pain medications contain Tylenol (acetaminophen), including Vicodin, Tylenol #3, Norco, Lortab, and Percocet. You should not take any extra pills of Tylenol if you are using these prescription medications or you can get very sick. Do not ever take more than 4000 mg of acetaminophen in any 24 hour period.  All opioids tend to cause constipation. Drink plenty of water and eat foods that have a lot of fiber, such as fruits, vegetables, prune juice, apple juice and high fiber cereal. Take a laxative if you don t move your  bowels at least every other day. Miralax, Milk of Magnesia, Colace, or Senna can be used to keep you regular.            Zofran for nausea/vomiting      Discharge Instructions  Abdominal Pain    Abdominal pain (belly pain) can be caused by many things. Your evaluation today does not show the exact cause for your pain. Your provider today has decided that it is unlikely your pain is due to a life threatening problem, or a problem requiring surgery or hospital admission. Sometimes those problems cannot be found right away, so it is very important that you follow up as directed.  Sometimes only the changes which occur over time allow the cause of your pain to be found.    Generally, every Emergency Department visit should have a follow-up clinic visit with either a primary or a specialty clinic/provider. Please follow-up as instructed by your emergency provider today. With abdominal pain, we often recommend very close follow-up, such as the following day.    ADULTS:  Return to the Emergency Department right away if:      You get an oral temperature above 102oF or as directed by your provider.    You have blood in your stools. This may be bright red or appear as black, tarry stools.      You keep vomiting (throwing up) or cannot drink liquids.    You see blood when you vomit.     You cannot have a bowel movement or you cannot pass gas.    Your stomach gets bloated or bigger.    Your skin or the whites of your eyes look yellow.    You faint.    You have bloody, frequent or painful urination (peeing).    You have new symptoms or anything that worries you.    CHILDREN:  Return to the Emergency Department right away if your child has any of the above-listed symptoms or the following:      Pushes your hand away or screams/cries when his/her belly is touched.    You notice your child is very fussy or weak.    Your child is very tired and is too tired to eat or drink.    Your child is dehydrated.  Signs of dehydration can  be:  o Significant change in the amount of wet diapers/urine.  o Your infant or child starts to have dry mouth and lips, or no saliva (spit) or tears.    PREGNANT WOMEN:  Return to the Emergency Department right away if you have any of the above-listed symptoms or the following:      You have bleeding, leaking fluid or passing tissue from the vagina.    You have worse pain or cramping, or pain in your shoulder or back.    You have vomiting that will not stop.    You have a temperature of 100oF or more.    Your baby is not moving as much as usual.    You faint.    You get a bad headache with or without eye problems and abdominal pain.    You have a seizure.    You have unusual discharge from your vagina and abdominal pain.    Abdominal pain is pretty common during pregnancy.  Your pain may or may not be related to your pregnancy. You should follow-up closely with your OB provider so they can evaluate you and your baby.  Until you follow-up with your regular provider, do the following:       Avoid sex and do not put anything in your vagina.    Drink clear fluids.    Only take medications approved by your provider.    MORE INFORMATION:    Appendicitis:  A possible cause of abdominal pain in any person who still has their appendix is acute appendicitis. Appendicitis is often hard to diagnose.  Testing does not always rule out early appendicitis or other causes of abdominal pain. Close follow-up with your provider and re-evaluations may be needed to figure out the reason for your abdominal pain.    Follow-up:  It is very important that you make an appointment with your clinic and go to the appointment.  If you do not follow-up with your primary provider, it may result in missing an important development which could result in permanent injury or disability and/or lasting pain.  If there is any problem keeping your appointment, call your provider or return to the Emergency Department.    Medications:  Take your medications  "as directed by your provider today.  Before using over-the-counter medications, ask your provider and make sure to take the medications as directed.  If you have any questions about medications, ask your provider.    Diet:  Resume your normal diet as much as possible, but do not eat fried, fatty or spicy foods while you have pain.  Do not drink alcohol or have caffeine.  Do not smoke tobacco.    Probiotics: If you have been given an antibiotic, you may want to also take a probiotic pill or eat yogurt with live cultures. Probiotics have \"good bacteria\" to help your intestines stay healthy. Studies have shown that probiotics help prevent diarrhea (loose stools) and other intestine problems (including C. diff infection) when you take antibiotics. You can buy these without a prescription in the pharmacy section of the store.     If you were given a prescription for medicine here today, be sure to read all of the information (including the package insert) that comes with your prescription.  This will include important information about the medicine, its side effects, and any warnings that you need to know about.  The pharmacist who fills the prescription can provide more information and answer questions you may have about the medicine.  If you have questions or concerns that the pharmacist cannot address, please call or return to the Emergency Department.       Remember that you can always come back to the Emergency Department if you are not able to see your regular provider in the amount of time listed above, if you get any new symptoms, or if there is anything that worries you.      "

## 2018-09-23 NOTE — ED AVS SNAPSHOT
[facility]    [address]    [facility] [address]    Phone:  [phone]    Fax:  [phone]                                       Kate Uribe   MRN: [id number]    Department:  [facility] Emergency Department   Date of Visit:  9/23/2018           After Visit Summary Signature Page     I have received my discharge instructions, and my questions have been answered. I have discussed any challenges I see with this plan with the nurse or doctor.    ..........................................................................................................................................  Patient/Patient Representative Signature      ..........................................................................................................................................  Patient Representative Print Name and Relationship to Patient    ..................................................               ................................................  Date                                   Time    ..........................................................................................................................................  Reviewed by Signature/Title    ...................................................              ..............................................  Date                                               Time          22EPIC Rev 08/18

## 2018-09-23 NOTE — ED TRIAGE NOTES
Patient discharged from ED earlier this morning. Was here with epigastric abdominal pain, pain is now worse and lower in abdomen.

## 2018-09-23 NOTE — ED PROVIDER NOTES
History     Chief Complaint:    Abdominal Pain      HPI   Kate Uribe is a 28 year old female with a history of Chron's disease and GERD who returns to the ED for worsening abdominal pain. The patient was seen here in the ED for evaluation of abdominal pain last night that was dissimilar to her history of abdominal pain secondary to her Chron's disease. She had an US performed which was generally unremarkable, results noted below. The etiology of her abdominal pain was not identified at that time and she was discharged with Zofran and Oxycodone. She reports that the oxycodone provided little to no relief of her abdominal pain and notes that he abdominal pain has since migrated from her epigastric region to her bilateral lower quadrants. Given that her abdominal pain has not resolved and has migrated she returned to the ER for further evaluation. She notes continued nausea in addition to her abdominal pain. She also complains of intermittent loose stools. She denies any symptoms of dysuria, hematuria, or vomit.     US Abdomen, limited:  Other than a 4.9 cm hemangioma in the liver, unremarkable right upper quadrant ultrasound, as per radiology.    Allergies:  Penicillins    Medications:    Albuterol  Imuran  Prozac  Flovent  Vistaril  Uremica  Zofran  Oxycodone     Past Medical History:    [place]  Asthma  Chron's disease  Heart murmer  GERD  MILA  Mental disorder  Noninfectious ileitis  Preeclampsia    Past Surgical History:    Breast reduction  C/S (x2)    Family History:    Diabetes  HTN  Hyperlipidemia  Obesity  Gallbladder disease  Mental illness  Thyroid disease    Social History:  Negative for tobacco use.  Negative for alcohol use.  Marital Status:   [2]       Review of Systems   Gastrointestinal: Positive for abdominal pain, diarrhea and nausea. Negative for vomiting.   Genitourinary: Negative for dysuria and hematuria.   All other systems reviewed and are negative.      Physical Exam   First  Vitals:  BP: 139/88  Heart Rate: 82  Temp: 97.7  F (36.5  C)  Resp: 18  SpO2: 99 %      Physical Exam   Constitutional: She appears well-developed and well-nourished.   HENT:   Right Ear: External ear normal.   Left Ear: External ear normal.   Mouth/Throat: Oropharynx is clear and moist. No oropharyngeal exudate.   TM's clear bilaterally   Eyes: Conjunctivae are normal. Pupils are equal, round, and reactive to light. No scleral icterus.   Neck: Normal range of motion. Neck supple.   Cardiovascular: Normal rate, regular rhythm, normal heart sounds and intact distal pulses.  Exam reveals no gallop and no friction rub.    No murmur heard.  Pulmonary/Chest: Effort normal and breath sounds normal. No respiratory distress. She has no wheezes. She has no rales.   Abdominal: Soft. Bowel sounds are normal. She exhibits no distension and no mass. There is tenderness (Epigastric and diffuse bilateral lower quadrant). There is no rebound and no guarding.   Musculoskeletal: Normal range of motion. She exhibits no edema.   Neurological: She is alert.   Skin: Skin is warm and dry. No rash noted.   Psychiatric: She has a normal mood and affect.       Emergency Department Course   Imaging:  Radiographic findings were communicated with the patient who voiced understanding of the findings.  CT Abdomen pelvis with contrast:   1. Terminal ileitis (Crohn's) with functional distal small bowel  obstruction.  2. Additional findings discussed above as per radiology.      Laboratory:  CMP: Glucose 100 (H), o/w WNL (Creatinine: 0.71)    0814 Lactic acid: 0.7  Lipase: 136      Interventions:  0815 NS 1,000 mLs IV  0818 Zofran 4 mg IV  0819 Dilaudid 0.5 mg IV  0944 Dilaudid 0.5 mg IV  1014 Solu-Medrol 30 mg IV      Emergency Department Course:  Nursing notes and vitals reviewed. (0812) I performed an exam of the patient as documented above.     IV inserted. Medicine administered as documented above. Blood drawn. This was sent to the lab for  further testing, results above.     The patient was sent for a Abdomen/Pelvis CT while in the emergency department, findings above.     0950 I rechecked the patient and discussed the results of her workup thus far.     1002 I consulted with Dr. Zendejas, GI, regarding the patient's history and presentation here in the emergency department.     1015  I consulted with Dr. Meyers, ER physician at [facility]. They are in agreement to accept the patient for transfer.    Findings and plan explained to the Patient who consents to transfer. Discussed the patient with Dr. Mahan, who will admit the patient to a [facility] bed for further monitoring, evaluation, and treatment.    Impression & Plan    Medical Decision Making:  Kate Whaley presents with increased abdominal pain after being evaluated earlier today in the ER. Pt is now having more RLQ pain and is in severe pain and nausea. Examination included CT scan which showed a functional obstruction with terminal ileitis. She is still having quite a bit of discomfort so we did discuss admission for IV pain control. Her GI physician, Dr. Zendejas, is at [facility] and he did recommend admission for IV steroids and pain control as well. At this point I did discuss with the patient and she does want to be admitted at [facility] so she can be seen by her GI physician. Transfer was arranged. IV steroid was given here and patient is transferred to [facility] for further intervention from her GI physician.       Diagnosis:    ICD-10-CM    1. Crohn's colitis, other complication (H) K50.118        Disposition:  Transferred to [facility].    Scribe Disclosure:  I,  Earle Altamirano, am serving as a scribe on 9/23/2018 at 8:12 AM to personally document services performed by Pepe Cain MD based on my observations and the provider's statements to me.          Earle Altamirano  9/23/2018   [facility] EMERGENCY DEPARTMENT       Pepe Cain MD  09/23/18  [id number]

## 2018-09-23 NOTE — ED TRIAGE NOTES
Pt reports sharp mid upper abd pain that started this afternoon, pain came on slowly. Pt is nauseated and has vomited, denies diarrhea but states her stool is loose. Pt has hx of Crohns but states this pain feels different. No aggravating or alleviating factors. Pt has not tried to eat since the pain started.

## 2018-10-06 ENCOUNTER — MYC MEDICAL ADVICE (OUTPATIENT)
Dept: PEDIATRICS | Facility: CLINIC | Age: 28
End: 2018-10-06

## 2018-11-01 ENCOUNTER — TELEPHONE (OUTPATIENT)
Dept: PHARMACY | Facility: CLINIC | Age: 28
End: 2018-11-01

## 2018-11-01 ENCOUNTER — TELEPHONE (OUTPATIENT)
Dept: PEDIATRICS | Facility: CLINIC | Age: 28
End: 2018-11-01

## 2018-11-01 NOTE — TELEPHONE ENCOUNTER
Mailed ACT. Will reach out and starting my own panel do to my outreach.    Aysha Blankenship MA 2:07 PM 11/1/2018

## 2018-11-01 NOTE — TELEPHONE ENCOUNTER
Panel Management Review      Patient has the following on her problem list:     Asthma review     ACT Total Scores 2/6/2017   ACT TOTAL SCORE (Goal Greater than or Equal to 20) 23   In the past 12 months, how many times did you visit the emergency room for your asthma without being admitted to the hospital? 0   In the past 12 months, how many times were you hospitalized overnight because of your asthma? 0      1. Is Asthma diagnosis on the Problem List? Yes    2. Is Asthma listed on Health Maintenance? Yes    3. Patient is due for:  ACT and AAP      Composite cancer screening  Chart review shows that this patient is due/due soon for the following None  Summary:    Patient is due/failing the following:   AAP, ACT and DAP    Action needed:   Patient needs to do ACT.    Type of outreach:    Copy of ACT mailed to patient, will reach out in 5 days.    Questions for provider review:    None                                                                                                                                    Aysha Blankenship MA 2:08 PM 11/1/2018        Chart routed to self .

## 2018-11-01 NOTE — LETTER
My Asthma Action Plan  Name: Kate Uribe   YOB: 1990  Date: 11/1/2018   My doctor: Bibi Gamble MD   My clinic: [facility]        My Control Medicine: Fluticasone propionate (Flovent) -   mcg 2 puffs  My Rescue Medicine: Albuterol (Proair/Ventolin/Proventil) inhaler as needed   My Asthma Severity: intermittent  Avoid your asthma triggers: Aware of triggers               GREEN ZONE   Good Control    I feel good    No cough or wheeze    Can work, sleep and play without asthma symptoms       Take your asthma control medicine every day.     1. If exercise triggers your asthma, take your rescue medication    15 minutes before exercise or sports, and    During exercise if you have asthma symptoms  2. Spacer to use with inhaler: If you have a spacer, make sure to use it with your inhaler             YELLOW ZONE Getting Worse  I have ANY of these:    I do not feel good    Cough or wheeze    Chest feels tight    Wake up at night   1. Keep taking your Green Zone medications  2. Start taking your rescue medicine:    every 20 minutes for up to 1 hour. Then every 4 hours for 24-48 hours.  3. If you stay in the Yellow Zone for more than 12-24 hours, contact your doctor.  4. If you do not return to the Green Zone in 12-24 hours or you get worse, start taking your oral steroid medicine if prescribed by your provider.           RED ZONE Medical Alert - Get Help  I have ANY of these:    I feel awful    Medicine is not helping    Breathing getting harder    Trouble walking or talking    Nose opens wide to breathe       1. Take your rescue medicine NOW  2. If your provider has prescribed an oral steroid medicine, start taking it NOW  3. Call your doctor NOW  4. If you are still in the Red Zone after 20 minutes and you have not reached your doctor:    Take your rescue medicine again and    Call 911 or go to the emergency room right away    See your regular doctor within 2 weeks of an  Emergency Room or Urgent Care visit for follow-up treatment.          Annual Reminders:  Meet with Asthma Educator,  Flu Shot in the Fall, consider Pneumonia Vaccination for patients with asthma (aged 19 and older).    Pharmacy: [facility] [place] [place] - [address]                       Asthma Triggers  How To Control Things That Make Your Asthma Worse    Triggers are things that make your asthma worse.  Look at the list below to help you find your triggers and what you can do about them.  You can help prevent asthma flare-ups by staying away from your triggers.      Trigger                                                          What you can do   Cigarette Smoke  Tobacco smoke can make asthma worse. Do not allow smoking in your home, car or around you.  Be sure no one smokes at a child s day care or school.  If you smoke, ask your health care provider for ways to help you quit.  Ask family members to quit too.  Ask your health care provider for a referral to Quit Plan to help you quit smoking, or call 4-939-998-PLAN.     Colds, Flu, Bronchitis  These are common triggers of asthma. Wash your hands often.  Don t touch your eyes, nose or mouth.  Get a flu shot every year.     Dust Mites  These are tiny bugs that live in cloth or carpet. They are too small to see. Wash sheets and blankets in hot water every week.   Encase pillows and mattress in dust mite proof covers.  Avoid having carpet if you can. If you have carpet, vacuum weekly.   Use a dust mask and HEPA vacuum.   Pollen and Outdoor Mold  Some people are allergic to trees, grass, or weed pollen, or molds. Try to keep your windows closed.  Limit time out doors when pollen count is high.   Ask you health care provider about taking medicine during allergy season.     Animal Dander  Some people are allergic to skin flakes, urine or saliva from pets with fur or feathers. Keep pets with fur or feathers out of your home.    If you can t  keep the pet outdoors, then keep the pet out of your bedroom.  Keep the bedroom door closed.  Keep pets off cloth furniture and away from stuffed toys.     Mice, Rats, and Cockroaches  Some people are allergic to the waste from these pests.   Cover food and garbage.  Clean up spills and food crumbs.  Store grease in the refrigerator.   Keep food out of the bedroom.   Indoor Mold  This can be a trigger if your home has high moisture. Fix leaking faucets, pipes, or other sources of water.   Clean moldy surfaces.  Dehumidify basement if it is damp and smelly.   Smoke, Strong Odors, and Sprays  These can reduce air quality. Stay away from strong odors and sprays, such as perfume, powder, hair spray, paints, smoke incense, paint, cleaning products, candles and new carpet.   Exercise or Sports  Some people with asthma have this trigger. Be active!  Ask your doctor about taking medicine before sports or exercise to prevent symptoms.    Warm up for 5-10 minutes before and after sports or exercise.     Other Triggers of Asthma  Cold air:  Cover your nose and mouth with a scarf.  Sometimes laughing or crying can be a trigger.  Some medicines and food can trigger asthma.

## 2018-11-01 NOTE — LETTER
November 1, 2018      Kate Uribe  [address] [facility] [address]        Dear Kate,       We care about your health and have reviewed your health plan including your medical conditions, medications, and lab results.  Based on this review, it is recommended that you follow up regarding the following health topic(s):  -Asthma    We recommend you take the following action(s):   -Complete and return the attached ASTHMA CONTROL TEST.  If your total score is 19 or less or you have been to the ER or urgent care for your asthma, then please schedule an asthma followup appointment.     Please call us at the [facility] - [phone] (or use [employer]) to address the above recommendations.     Thank you for trusting [facility] and we appreciate the opportunity to serve you.  We look forward to supporting your healthcare needs in the future.    Healthy Regards,    Your Health Care Team  [facility]

## 2018-11-01 NOTE — LETTER
My Depression Action Plan  Name: Kate Uribe   Date of Birth 1990  Date: 11/1/2018    My doctor: Bibi Gamble   My clinic: [address]  [address]  [facility] [address] [phone]          GREEN    ZONE   Good Control    What it looks like:     Things are going generally well. You have normal up s and down s. You may even feel depressed from time to time, but bad moods usually last less than a day.   What you need to do:  1. Continue to care for yourself (see self care plan)  2. Check your depression survival kit and update it as needed  3. Follow your physician s recommendations including any medication.  4. Do not stop taking medication unless you consult with your physician first.           YELLOW         ZONE Getting Worse    What it looks like:     Depression is starting to interfere with your life.     It may be hard to get out of bed; you may be starting to isolate yourself from others.    Symptoms of depression are starting to last most all day and this has happened for several days.     You may have suicidal thoughts but they are not constant.   What you need to do:     1. Call your care team, your response to treatment will improve if you keep your care team informed of your progress. Yellow periods are signs an adjustment may need to be made.     2. Continue your self-care, even if you have to fake it!    3. Talk to someone in your support network    4. Open up your depression survival kit           RED    ZONE Medical Alert - Get Help    What it looks like:     Depression is seriously interfering with your life.     You may experience these or other symptoms: You can t get out of bed most days, can t work or engage in other necessary activities, you have trouble taking care of basic hygiene, or basic responsibilities, thoughts of suicide or death that will not go away, self-injurious behavior.     What you need to do:  1. Call  your care team and request a same-day appointment. If they are not available (weekends or after hours) call your local crisis line, emergency room or 911.            Depression Self Care Plan / Survival Kit    Self-Care for Depression  Here s the deal. Your body and mind are really not as separate as most people think.  What you do and think affects how you feel and how you feel influences what you do and think. This means if you do things that people who feel good do, it will help you feel better.  Sometimes this is all it takes.  There is also a place for medication and therapy depending on how severe your depression is, so be sure to consult with your medical provider and/ or Behavioral Health Consultant if your symptoms are worsening or not improving.     In order to better manage my stress, I will:    Exercise  Get some form of exercise, every day. This will help reduce pain and release endorphins, the  feel good  chemicals in your brain. This is almost as good as taking antidepressants!  This is not the same as joining a gym and then never going! (they count on that by the way ) It can be as simple as just going for a walk or doing some gardening, anything that will get you moving.      Hygiene   Maintain good hygiene (Get out of bed in the morning, Make your bed, Brush your teeth, Take a shower, and Get dressed like you were going to work, even if you are unemployed).  If your clothes don't fit try to get ones that do.    Diet  I will strive to eat foods that are good for me, drink plenty of water, and avoid excessive sugar, caffeine, alcohol, and other mood-altering substances.  Some foods that are helpful in depression are: complex carbohydrates, B vitamins, flaxseed, fish or fish oil, fresh fruits and vegetables.    Psychotherapy  I agree to participate in Individual Therapy (if recommended).    Medication  If prescribed medications, I agree to take them.  Missing doses can result in serious side effects.   I understand that drinking alcohol, or other illicit drug use, may cause potential side effects.  I will not stop my medication abruptly without first discussing it with my provider.    Staying Connected With Others  I will stay in touch with my friends, family members, and my primary care provider/team.    Use your imagination  Be creative.  We all have a creative side; it doesn t matter if it s oil painting, sand castles, or mud pies! This will also kick up the endorphins.    Witness Beauty  (AKA stop and smell the roses) Take a look outside, even in mid-winter. Notice colors, textures. Watch the squirrels and birds.     Service to others  Be of service to others.  There is always someone else in need.  By helping others we can  get out of ourselves  and remember the really important things.  This also provides opportunities for practicing all the other parts of the program.    Humor  Laugh and be silly!  Adjust your TV habits for less news and crime-drama and more comedy.    Control your stress  Try breathing deep, massage therapy, biofeedback, and meditation. Find time to relax each day.     My support system    Clinic Contact:  Phone number:    Contact 1:  Phone number:    Contact 2:  Phone number:    Spiritism/:  Phone number:    Therapist:  Phone number:    [facility] crisis center:    Phone number:    Other community support:  Phone number:

## 2018-11-05 ENCOUNTER — MYC MEDICAL ADVICE (OUTPATIENT)
Dept: PEDIATRICS | Facility: CLINIC | Age: 28
End: 2018-11-05

## 2018-11-05 DIAGNOSIS — E66.01 OBESITY, MORBID, BMI 40.0-49.9 (H): ICD-10-CM

## 2018-11-06 RX ORDER — PHENTERMINE HYDROCHLORIDE 15 MG/1
15 CAPSULE ORAL EVERY MORNING
Qty: 30 CAPSULE | Refills: 0 | Status: CANCELLED | OUTPATIENT
Start: 2018-11-06

## 2018-11-06 NOTE — TELEPHONE ENCOUNTER
Please review, patient asking for a refill of 30 mg phentermine.  T'd up both strengths as last refill was for 15 mg-both strengths on the medication list-we will need to update the medication list and remove one after this has been addressed-patient is due for appointment, but unable to schedule until Dec 7th due to her new job.    Routing refill request to provider for review/approval because:  Drug not on the G refill protocol   Lorena Guzman RN  Message handled by Nurse Triage.

## 2018-11-07 RX ORDER — PHENTERMINE HYDROCHLORIDE 30 MG/1
30 CAPSULE ORAL EVERY MORNING
Qty: 30 CAPSULE | Refills: 0 | Status: SHIPPED | OUTPATIENT
Start: 2018-11-07 | End: 2018-12-07

## 2018-11-07 NOTE — TELEPHONE ENCOUNTER
Please let patient know ok to try the 30mg again - if having any palpitations though, needs to let me know.    Printed, signed, in my outbox.    Bibi Gamble MD  Internal Medicine/Pediatrics  [facility]

## 2018-11-07 NOTE — TELEPHONE ENCOUNTER
Rx walked to [facility] pharmacy.  My chart message sent letting her know this and to let us know if she has any palpitations.  Pt aware she needs visit and will schedule soon.    Emilie Walsh MA

## 2018-11-19 ENCOUNTER — MYC REFILL (OUTPATIENT)
Dept: PEDIATRICS | Facility: CLINIC | Age: 28
End: 2018-11-19

## 2018-11-19 DIAGNOSIS — J45.20 MILD INTERMITTENT ASTHMA IN ADULT WITHOUT COMPLICATION: ICD-10-CM

## 2018-11-19 NOTE — TELEPHONE ENCOUNTER
Message from MyChart:  Original authorizing provider: MD TEZ Condon would like a refill of the following medications:  albuterol (PROAIR HFA/PROVENTIL HFA/VENTOLIN HFA) 108 (90 BASE) MCG/ACT Inhaler [Bertha Medina MD]    Preferred pharmacy: [facility] [place] - [address] [place] [address]     Comment:

## 2018-11-21 NOTE — TELEPHONE ENCOUNTER
"Routing refill request to provider for review/approval because:  ACT done in 2/6/2017. LOV 9/14/18 PCP wanted pt back in 1 month no f/u.     Albuterol 108 mcg  Last Written Prescription Date:  3/19/18  Last Fill Quantity: 1,  # refills: 0   Last office visit: 9/14/2018 with prescribing provider:  BERTA Gamble   Future Office Visit:   Next 5 appointments (look out 90 days)     Dec 07, 2018 11:40 AM MARGRET Kwok with Bibi Gamble MD   [facility]    [address] [place] [address]  [address]  [facility] [address] [phone]                 Requested Prescriptions   Pending Prescriptions Disp Refills     albuterol (PROAIR HFA/PROVENTIL HFA/VENTOLIN HFA) 108 (90 Base) MCG/ACT inhaler 1 Inhaler 0     Sig: Inhale 2 puffs into the lungs every 6 hours    Asthma Maintenance Inhalers - Anticholinergics Failed    11/19/2018 12:29 PM       Failed - Asthma control assessment score within normal limits in last 6 months    Please review ACT score.          Passed - Patient is age 12 years or older       Passed - Recent (6 mo) or future (30 days) visit within the authorizing provider's specialty    Patient had office visit in the last 6 months or has a visit in the next 30 days with authorizing provider or within the authorizing provider's specialty.  See \"Patient Info\" tab in inbasket, or \"Choose Columns\" in Meds & Orders section of the refill encounter.          Nakita JIM RN     "

## 2018-11-23 RX ORDER — ALBUTEROL SULFATE 90 UG/1
2 AEROSOL, METERED RESPIRATORY (INHALATION) EVERY 6 HOURS
Qty: 1 INHALER | Refills: 11 | Status: SHIPPED | OUTPATIENT
Start: 2018-11-23

## 2018-11-26 ENCOUNTER — MYC MEDICAL ADVICE (OUTPATIENT)
Dept: PEDIATRICS | Facility: CLINIC | Age: 28
End: 2018-11-26

## 2018-11-26 DIAGNOSIS — Z13.6 SCREENING FOR CARDIOVASCULAR CONDITION: Primary | ICD-10-CM

## 2018-11-26 NOTE — TELEPHONE ENCOUNTER
Please advise-does patient need any labs before the appointment on 12/7?      Lorena Guzman RN  Message handled by Nurse Triage.

## 2018-11-27 DIAGNOSIS — Z13.6 SCREENING FOR CARDIOVASCULAR CONDITION: ICD-10-CM

## 2018-11-27 LAB
CHOLEST SERPL-MCNC: 209 MG/DL
HDLC SERPL-MCNC: 58 MG/DL
LDLC SERPL CALC-MCNC: 125 MG/DL
NONHDLC SERPL-MCNC: 151 MG/DL
TRIGL SERPL-MCNC: 129 MG/DL

## 2018-11-27 PROCEDURE — 80061 LIPID PANEL: CPT | Performed by: PEDIATRICS

## 2018-11-27 PROCEDURE — 36415 COLL VENOUS BLD VENIPUNCTURE: CPT | Performed by: PEDIATRICS

## 2018-12-07 ENCOUNTER — OFFICE VISIT (OUTPATIENT)
Dept: PEDIATRICS | Facility: CLINIC | Age: 28
End: 2018-12-07
Payer: COMMERCIAL

## 2018-12-07 VITALS
BODY MASS INDEX: 43.87 KG/M2 | DIASTOLIC BLOOD PRESSURE: 72 MMHG | HEART RATE: 91 BPM | TEMPERATURE: 98.9 F | HEIGHT: 64 IN | WEIGHT: 257 LBS | SYSTOLIC BLOOD PRESSURE: 118 MMHG | OXYGEN SATURATION: 98 %

## 2018-12-07 DIAGNOSIS — E66.01 OBESITY, MORBID, BMI 40.0-49.9 (H): ICD-10-CM

## 2018-12-07 PROCEDURE — 99213 OFFICE O/P EST LOW 20 MIN: CPT | Performed by: PEDIATRICS

## 2018-12-07 RX ORDER — PHENTERMINE HYDROCHLORIDE 30 MG/1
30 CAPSULE ORAL EVERY MORNING
Qty: 30 CAPSULE | Refills: 0 | Status: SHIPPED | OUTPATIENT
Start: 2019-01-07 | End: 2019-02-01

## 2018-12-07 RX ORDER — PHENTERMINE HYDROCHLORIDE 30 MG/1
30 CAPSULE ORAL EVERY MORNING
Qty: 30 CAPSULE | Refills: 0 | Status: SHIPPED | OUTPATIENT
Start: 2018-12-07 | End: 2019-01-17

## 2018-12-07 ASSESSMENT — PATIENT HEALTH QUESTIONNAIRE - PHQ9
SUM OF ALL RESPONSES TO PHQ QUESTIONS 1-9: 3
5. POOR APPETITE OR OVEREATING: SEVERAL DAYS

## 2018-12-07 ASSESSMENT — ANXIETY QUESTIONNAIRES
1. FEELING NERVOUS, ANXIOUS, OR ON EDGE: SEVERAL DAYS
2. NOT BEING ABLE TO STOP OR CONTROL WORRYING: SEVERAL DAYS
GAD7 TOTAL SCORE: 6
3. WORRYING TOO MUCH ABOUT DIFFERENT THINGS: SEVERAL DAYS
6. BECOMING EASILY ANNOYED OR IRRITABLE: SEVERAL DAYS
7. FEELING AFRAID AS IF SOMETHING AWFUL MIGHT HAPPEN: SEVERAL DAYS
5. BEING SO RESTLESS THAT IT IS HARD TO SIT STILL: NOT AT ALL
IF YOU CHECKED OFF ANY PROBLEMS ON THIS QUESTIONNAIRE, HOW DIFFICULT HAVE THESE PROBLEMS MADE IT FOR YOU TO DO YOUR WORK, TAKE CARE OF THINGS AT HOME, OR GET ALONG WITH OTHER PEOPLE: SOMEWHAT DIFFICULT

## 2018-12-07 NOTE — PATIENT INSTRUCTIONS
Ok to continue phentermine.    Plan to follow up in 2 months - call or mychart.    Bibi Gamble MD  Internal Medicine/Pediatrics  [facility]

## 2018-12-07 NOTE — MR AVS SNAPSHOT
After Visit Summary   12/7/2018    Kate Uribe    MRN: [id number]           Patient Information     Date Of Birth          1990        Visit Information        Provider Department      12/7/2018 8:40 AM Bibi Gamble MD [facility]        Today's Diagnoses     Obesity, morbid, BMI 40.0-49.9 (H)          Care Instructions    Ok to continue phentermine.    Plan to follow up in 2 months - call or mychart.    Bibi Gamble MD  Internal Medicine/Pediatrics  [facility]              Follow-ups after your visit        Who to contact     If you have questions or need follow up information about today's clinic visit or your schedule please contact [facility] directly at [phone].  Normal or non-critical lab and imaging results will be communicated to you by MyChart, letter or phone within 4 business days after the clinic has received the results. If you do not hear from us within 7 days, please contact the clinic through MyChart or phone. If you have a critical or abnormal lab result, we will notify you by phone as soon as possible.  Submit refill requests through [facility] or call your pharmacy and they will forward the refill request to us. Please allow 3 business days for your refill to be completed.          Additional Information About Your Visit        MyChart Information     [facility] gives you secure access to your electronic health record. If you see a primary care provider, you can also send messages to your care team and make appointments. If you have questions, please call your primary care clinic.  If you do not have a primary care provider, please call [phone] and they will assist you.        Care EveryWhere ID     This is your Care EveryWhere ID. This could be used by other organizations to access your [place] medical records  [id number]        Your Vitals Were     Pulse Temperature Height Pulse Oximetry BMI (Body Mass Index)       91  "98.9  F (37.2  C) (Oral) 5' 4\" (1.626 m) 98% 44.11 kg/m2        Blood Pressure from Last 3 Encounters:   12/07/18 118/72   09/23/18 135/69   09/23/18 127/55    Weight from Last 3 Encounters:   12/07/18 257 lb (116.6 kg)   09/23/18 265 lb (120.2 kg)   09/14/18 268 lb (121.6 kg)              Today, you had the following     No orders found for display         Today's Medication Changes          These changes are accurate as of 12/7/18  9:07 AM.  If you have any questions, ask your nurse or doctor.               These medicines have changed or have updated prescriptions.        Dose/Directions    * phentermine 15 MG capsule   Commonly known as:  ADIPEX-P   This may have changed:    - Another medication with the same name was added. Make sure you understand how and when to take each.  - Another medication with the same name was changed. Make sure you understand how and when to take each.   Used for:  Obesity, morbid, BMI 40.0-49.9 (H)   Changed by:  Bibi Gamble MD        Dose:  15 mg   Take 1 capsule (15 mg) by mouth every morning   Quantity:  30 capsule   Refills:  0       * phentermine 30 MG capsule   Commonly known as:  ADIPEX-P   This may have changed:  You were already taking a medication with the same name, and this prescription was added. Make sure you understand how and when to take each.   Used for:  Obesity, morbid, BMI 40.0-49.9 (H)   Changed by:  Bibi Gamble MD        Dose:  30 mg   Take 1 capsule (30 mg) by mouth every morning   Quantity:  30 capsule   Refills:  0       * phentermine 30 MG capsule   Commonly known as:  ADIPEX-P   This may have changed:  These instructions start on 1/7/2019. If you are unsure what to do until then, ask your doctor or other care provider.   Used for:  Obesity, morbid, BMI 40.0-49.9 (H)   Changed by:  Bibi Gamble MD        Dose:  30 mg   Start taking on:  1/7/2019   Take 1 capsule (30 mg) by mouth every morning   Quantity:  30 capsule   Refills:  0 "       * Notice:  This list has 3 medication(s) that are the same as other medications prescribed for you. Read the directions carefully, and ask your doctor or other care provider to review them with you.         Where to get your medicines      Some of these will need a paper prescription and others can be bought over the counter.  Ask your nurse if you have questions.     Bring a paper prescription for each of these medications     phentermine 30 MG capsule    phentermine 30 MG capsule                Primary Care Provider Office Phone # Fax #    Bibi Gamble [phone] [address] [place] [address]        Equal Access to Services     [facility]: Hadii aad ku hadasho Soomaali, waaxda luqadaha, qaybta kaalmada adejean claudeyahonorio, rosendo floyd . So [facility] [phone].    ATENCIÓN: Si habla español, tiene a sandhu disposición servicios gratuitos de asistencia lingüística. [facility] [phone].    We comply with applicable federal civil rights laws and [place] laws. We do not discriminate on the basis of race, color, national origin, age, disability, sex, sexual orientation, or gender identity.            Thank you!     [facility] you for choosing [facility]  for your care. Our goal is always to provide you with excellent care. Hearing back from our patients is one way we can continue to improve our services. Please take a few minutes to complete the written survey that you may receive in the mail after your visit with us. Thank you!             Your Updated Medication List - Protect others around you: Learn how to safely use, store and throw away your medicines at www.disposemymeds.org.          This list is accurate as of 12/7/18  9:07 AM.  Always use your most recent med list.                   Brand Name Dispense Instructions for use Diagnosis    albuterol 108 (90 Base) MCG/ACT inhaler    PROAIR HFA/PROVENTIL HFA/VENTOLIN HFA    1 Inhaler     Inhale 2 puffs into the lungs every 6 hours    Mild intermittent asthma in adult without complication       FLUoxetine 40 MG capsule    PROzac    180 capsule    Take 2 capsules (80 mg) by mouth daily    MILA (generalized anxiety disorder)       fluticasone 110 MCG/ACT inhaler    FLOVENT HFA    1 Inhaler    Inhale 2 puffs into the lungs 2 times daily    Moderate persistent asthma with exacerbation       IMURAN PO      Take 50 mg by mouth 2 times daily        * phentermine 15 MG capsule    ADIPEX-P    30 capsule    Take 1 capsule (15 mg) by mouth every morning    Obesity, morbid, BMI 40.0-49.9 (H)       * phentermine 30 MG capsule    ADIPEX-P    30 capsule    Take 1 capsule (30 mg) by mouth every morning    Obesity, morbid, BMI 40.0-49.9 (H)       * phentermine 30 MG capsule   Start taking on:  1/7/2019    ADIPEX-P    30 capsule    Take 1 capsule (30 mg) by mouth every morning    Obesity, morbid, BMI 40.0-49.9 (H)       REMICADE IV      Inject 500 mg into the vein Every 8 weeks        VISTARIL PO      Take 50 mg by mouth once        * Notice:  This list has 3 medication(s) that are the same as other medications prescribed for you. Read the directions carefully, and ask your doctor or other care provider to review them with you.

## 2018-12-07 NOTE — PROGRESS NOTES
"  SUBJECTIVE:   Kate Uribe is a 28 year old female who presents to clinic today for the following health issues:      Medication Followup of  Phentermine    Taking Medication as prescribed: yes    Side Effects:  None    Medication Helping Symptoms:  yes       Wt Readings from Last 3 Encounters:   12/07/18 257 lb (116.6 kg)   09/23/18 265 lb (120.2 kg)   09/14/18 268 lb (121.6 kg)       Patient restarted higher dose of phentermine a month ago on 11/7 - so far no palpitations.  Continues to lose weight although not as fast as before. New job at [employer] in [place] - going ok, but stressful.    Problem list and histories reviewed & adjusted, as indicated.  Additional history: as documented    Reviewed and updated as needed this visit by clinical staff       Reviewed and updated as needed this visit by Provider         ROS:  Constitutional, HEENT, cardiovascular, pulmonary, gi and gu systems are negative, except as otherwise noted.    OBJECTIVE:     /72 (BP Location: Right arm, Cuff Size: Adult Large)  Pulse 91  Temp 98.9  F (37.2  C) (Oral)  Ht 5' 4\" (1.626 m)  Wt 257 lb (116.6 kg)  SpO2 98%  BMI 44.11 kg/m2  Body mass index is 44.11 kg/(m^2).  GENERAL APPEARANCE: healthy, alert and no distress  RESP: lungs clear to auscultation - no rales, rhonchi or wheezes  CV: regular rates and rhythm, normal S1 S2, no S3 or S4 and no murmur, click or rub    Diagnostic Test Results:  none     ASSESSMENT/PLAN:       1. Obesity, morbid, BMI 40.0-49.9 (H)  Continues to lose weight and met initial challenge of 5% weight loss in 3 months.  She is responding well to this medication - discussed continued exercise, goal 98022 steps per day and eating healthy.   - phentermine (ADIPEX-P) 30 MG capsule; Take 1 capsule (30 mg) by mouth every morning  Dispense: 30 capsule; Refill: 0  - phentermine (ADIPEX-P) 30 MG capsule; Take 1 capsule (30 mg) by mouth every morning  Dispense: 30 capsule; Refill: 0    Patient " Instructions   Ok to continue phentermine.    Plan to follow up in 2 months - call or mychart.    Bibi Gamble MD  Internal Medicine/Pediatrics  [facility]          Bibi Gamble MD  [facility]

## 2018-12-08 ASSESSMENT — ASTHMA QUESTIONNAIRES: ACT_TOTALSCORE: 25

## 2018-12-08 ASSESSMENT — ANXIETY QUESTIONNAIRES: GAD7 TOTAL SCORE: 6

## 2018-12-27 ENCOUNTER — MYC MEDICAL ADVICE (OUTPATIENT)
Dept: PEDIATRICS | Facility: CLINIC | Age: 28
End: 2018-12-27

## 2018-12-27 DIAGNOSIS — E66.01 OBESITY, MORBID, BMI 40.0-49.9 (H): Primary | ICD-10-CM

## 2018-12-27 RX ORDER — PHENTERMINE HYDROCHLORIDE 15 MG/1
15 CAPSULE ORAL EVERY MORNING
Qty: 30 CAPSULE | Refills: 0 | Status: SHIPPED | OUTPATIENT
Start: 2018-12-27 | End: 2019-01-17

## 2018-12-27 NOTE — TELEPHONE ENCOUNTER
Called and lm to let patient know rx is available to  downstairs at . Walked the patient's rx downstairs to the .    Aysha Blankenship MA 12:16 PM 12/27/2018

## 2019-01-16 ENCOUNTER — MYC MEDICAL ADVICE (OUTPATIENT)
Dept: PEDIATRICS | Facility: CLINIC | Age: 29
End: 2019-01-16

## 2019-01-17 NOTE — TELEPHONE ENCOUNTER
Last office visit 12/7, plan: Ok to continue phentermine.     Plan to follow up in 2 months - call or mychart.    Patient taking 30 mg-has an appt scheduled already.    Next 5 appointments (look out 90 days)    Feb 01, 2019  8:00 AM CST  [facility] with Bibi Gamble MD  [facility] [address] [place] [address] [phone]        Lorena Guzman RN  Message handled by Nurse Triage.

## 2019-01-31 ASSESSMENT — ENCOUNTER SYMPTOMS
DIARRHEA: 0
CHILLS: 0
EYE PAIN: 0
BREAST MASS: 1
FEVER: 0
HEADACHES: 0
DIZZINESS: 0
MYALGIAS: 0
PARESTHESIAS: 0
WEAKNESS: 0
SHORTNESS OF BREATH: 0
HEARTBURN: 0
DYSURIA: 0
PALPITATIONS: 0
FREQUENCY: 0
HEMATURIA: 0
SORE THROAT: 0
ARTHRALGIAS: 0
HEMATOCHEZIA: 0
NERVOUS/ANXIOUS: 0
COUGH: 0
JOINT SWELLING: 0
CONSTIPATION: 0
NAUSEA: 0
ABDOMINAL PAIN: 0

## 2019-02-01 ENCOUNTER — OFFICE VISIT (OUTPATIENT)
Dept: PEDIATRICS | Facility: CLINIC | Age: 29
End: 2019-02-01
Payer: COMMERCIAL

## 2019-02-01 VITALS
WEIGHT: 251 LBS | DIASTOLIC BLOOD PRESSURE: 64 MMHG | TEMPERATURE: 98.7 F | SYSTOLIC BLOOD PRESSURE: 110 MMHG | HEART RATE: 91 BPM | OXYGEN SATURATION: 97 % | BODY MASS INDEX: 42.85 KG/M2 | HEIGHT: 64 IN

## 2019-02-01 DIAGNOSIS — F41.1 GAD (GENERALIZED ANXIETY DISORDER): ICD-10-CM

## 2019-02-01 DIAGNOSIS — E66.01 OBESITY, MORBID, BMI 40.0-49.9 (H): ICD-10-CM

## 2019-02-01 DIAGNOSIS — Z12.4 SCREENING FOR CERVICAL CANCER: ICD-10-CM

## 2019-02-01 DIAGNOSIS — F32.0 MILD SINGLE CURRENT EPISODE OF MAJOR DEPRESSIVE DISORDER (H): ICD-10-CM

## 2019-02-01 DIAGNOSIS — N63.15 BREAST LUMP ON RIGHT SIDE AT 9 O'CLOCK POSITION: ICD-10-CM

## 2019-02-01 DIAGNOSIS — Z01.411 ENCOUNTER FOR GYNECOLOGICAL EXAMINATION WITH ABNORMAL FINDING: Primary | ICD-10-CM

## 2019-02-01 DIAGNOSIS — K50.00 CROHN'S DISEASE OF SMALL INTESTINE WITHOUT COMPLICATION (H): ICD-10-CM

## 2019-02-01 PROCEDURE — 99395 PREV VISIT EST AGE 18-39: CPT | Performed by: PEDIATRICS

## 2019-02-01 PROCEDURE — 99213 OFFICE O/P EST LOW 20 MIN: CPT | Mod: 25 | Performed by: PEDIATRICS

## 2019-02-01 PROCEDURE — G0145 SCR C/V CYTO,THINLAYER,RESCR: HCPCS | Performed by: PEDIATRICS

## 2019-02-01 PROCEDURE — 87624 HPV HI-RISK TYP POOLED RSLT: CPT | Performed by: PEDIATRICS

## 2019-02-01 RX ORDER — FOLIC ACID 0.8 MG
TABLET ORAL
COMMUNITY
End: 2020-09-28

## 2019-02-01 RX ORDER — PHENTERMINE HYDROCHLORIDE 30 MG/1
30 CAPSULE ORAL EVERY MORNING
Qty: 30 CAPSULE | Refills: 5 | Status: SHIPPED | OUTPATIENT
Start: 2019-02-01 | End: 2019-10-02

## 2019-02-01 RX ORDER — MULTIVIT-MIN/IRON/FOLIC ACID/K 18-600-40
CAPSULE ORAL
COMMUNITY

## 2019-02-01 RX ORDER — FLUOXETINE 40 MG/1
80 CAPSULE ORAL DAILY
Qty: 180 CAPSULE | Refills: 1 | Status: SHIPPED | OUTPATIENT
Start: 2019-02-01 | End: 2019-11-01

## 2019-02-01 ASSESSMENT — ENCOUNTER SYMPTOMS
SHORTNESS OF BREATH: 0
CHILLS: 0
JOINT SWELLING: 0
FREQUENCY: 0
NAUSEA: 0
DYSURIA: 0
HEARTBURN: 0
EYE PAIN: 0
HEADACHES: 0
DIARRHEA: 0
BREAST MASS: 1
FEVER: 0
HEMATOCHEZIA: 0
WEAKNESS: 0
ABDOMINAL PAIN: 0
CONSTIPATION: 0
NERVOUS/ANXIOUS: 0
PARESTHESIAS: 0
MYALGIAS: 0
SORE THROAT: 0
COUGH: 0
DIZZINESS: 0
ARTHRALGIAS: 0
PALPITATIONS: 0
HEMATURIA: 0

## 2019-02-01 ASSESSMENT — MIFFLIN-ST. JEOR: SCORE: 1843.53

## 2019-02-01 NOTE — PROGRESS NOTES
SUBJECTIVE:   CC: Kate Uribe is an 28 year old woman who presents for preventive health visit.     Physical   Annual:     Getting at least 3 servings of Calcium per day:  Yes    Bi-annual eye exam:  NO    Dental care twice a year:  Yes    Sleep apnea or symptoms of sleep apnea:  None    Diet:  Carbohydrate counting    Frequency of exercise:  None    Taking medications regularly:  Yes    Medication side effects:  None    Additional concerns today:  Yes    PHQ-2 Total Score: 0    1. Dry skin on face, itching  2. Right breast lump x 3 weeks, unchanging in size, non tender. H/o breast reduction in the past.  No FH of breast cancer.  3. Weight - doing well on phentermine - decreases her desire to eat all the time.  Weight down 6# since last visit 2 months ago.  No side effects - if takes after 9am, can impact her sleep.  4. Depression - stable on prozac.     Today's PHQ-2 Score:   PHQ-2 ( 1999 Pfizer) 1/31/2019   Q1: Little interest or pleasure in doing things 0   Q2: Feeling down, depressed or hopeless 0   PHQ-2 Score 0   Q1: Little interest or pleasure in doing things Not at all   Q2: Feeling down, depressed or hopeless Not at all   PHQ-2 Score 0       Abuse: Current or Past(Physical, Sexual or Emotional)- No  Do you feel safe in your environment? Yes    Social History     Tobacco Use     Smoking status: Never Smoker     Smokeless tobacco: Never Used   Substance Use Topics     Alcohol use: Yes     Alcohol/week: 0.0 - 1.2 oz     Comment: Social     Alcohol Use 1/31/2019   If you drink alcohol do you typically have greater than 3 drinks per day OR greater than 7 drinks per week? No       Reviewed orders with patient.  Reviewed health maintenance and updated orders accordingly - Yes  BP Readings from Last 3 Encounters:   02/01/19 110/64   12/07/18 118/72   09/23/18 135/69    Wt Readings from Last 3 Encounters:   02/01/19 113.9 kg (251 lb)   12/07/18 116.6 kg (257 lb)   09/23/18 120.2 kg (265 lb)                 "    Mammogram not appropriate for this patient based on age.    Pertinent mammograms are reviewed under the imaging tab.  History of abnormal Pap smear: NO - age 30-65 PAP every 5 years with negative HPV co-testing recommended  PAP / HPV 2/6/2017   PAP NIL     Reviewed and updated as needed this visit by clinical staff  Tobacco  Allergies  Meds  Med Hx  Surg Hx  Fam Hx  Soc Hx        Reviewed and updated as needed this visit by Provider            Review of Systems   Constitutional: Negative for chills and fever.   HENT: Positive for congestion. Negative for ear pain, hearing loss and sore throat.    Eyes: Negative for pain and visual disturbance.   Respiratory: Negative for cough and shortness of breath.    Cardiovascular: Negative for chest pain, palpitations and peripheral edema.   Gastrointestinal: Negative for abdominal pain, constipation, diarrhea, heartburn, hematochezia and nausea.   Breasts:  Positive for breast mass. Negative for tenderness and discharge.   Genitourinary: Negative for dysuria, frequency, genital sores, hematuria, pelvic pain, urgency, vaginal bleeding and vaginal discharge.   Musculoskeletal: Negative for arthralgias, joint swelling and myalgias.   Skin: Negative for rash.   Neurological: Negative for dizziness, weakness, headaches and paresthesias.   Psychiatric/Behavioral: Negative for mood changes. The patient is not nervous/anxious.           OBJECTIVE:   /64 (BP Location: Right arm, Cuff Size: Adult Large)   Pulse 91   Temp 98.7  F (37.1  C) (Oral)   Ht 1.626 m (5' 4\")   Wt 113.9 kg (251 lb)   SpO2 97%   BMI 43.08 kg/m    Physical Exam  GENERAL: healthy, alert and no distress  EYES: Eyes grossly normal to inspection, PERRL and conjunctivae and sclerae normal  HENT: ear canals and TM's normal, nose and mouth without ulcers or lesions  NECK: no adenopathy, no asymmetry, masses, or scars and thyroid normal to palpation  RESP: lungs clear to auscultation - no rales, " rhonchi or wheezes  BREAST: right with almond sized mobile soft mass along previous scar line, no overlying skin changes.  Left breast exam normal.  CV: regular rate and rhythm, normal S1 S2, no S3 or S4, no murmur, click or rub, no peripheral edema and peripheral pulses strong  ABDOMEN: soft, nontender, no hepatosplenomegaly, no masses and bowel sounds normal   (female): normal female external genitalia, normal urethral meatus, vaginal mucosa pink, moist, well rugated, and normal cervix/adnexa/uterus without masses or discharge  MS: no gross musculoskeletal defects noted, no edema  SKIN: no suspicious lesions or rashes  NEURO: Normal strength and tone, mentation intact and speech normal  PSYCH: mentation appears normal, affect normal/bright    Diagnostic Test Results:  none     ASSESSMENT/PLAN:   1. Encounter for gynecological examination with abnormal finding    2. Crohn's disease of small intestine without complication (H)  Follows with GI    3. Mild single current episode of major depressive disorder (H)  Stable on prozac, follow up in 6 months.     4. Screening for cervical cancer  - HPV High Risk Types DNA Cervical  - Pap imaged thin layer screen with HPV - recommended age 30 - 65 years (select HPV order below)    5. Breast lump on right side at 9 o'clock position- needs further evaluation to rule out breast cancer  - US Breast Right Complete 4 Quadrants; Future  - MA Diagnostic Digital Right; Future    6. MILA (generalized anxiety disorder)  Controlled, follow up in 6 months.   - FLUoxetine (PROZAC) 40 MG capsule; Take 2 capsules (80 mg) by mouth daily  Dispense: 180 capsule; Refill: 1    7. Obesity, morbid, BMI 40.0-49.9 (H)  Doing well - continue  - phentermine (ADIPEX-P) 30 MG capsule; Take 1 capsule (30 mg) by mouth every morning  Dispense: 30 capsule; Refill: 5    COUNSELING:  Reviewed preventive health counseling, as reflected in patient instructions    BP Readings from Last 1 Encounters:   02/01/19  "110/64     Estimated body mass index is 43.08 kg/m  as calculated from the following:    Height as of this encounter: 1.626 m (5' 4\").    Weight as of this encounter: 113.9 kg (251 lb).      Weight management plan: continue phentermine     reports that  has never smoked. she has never used smokeless tobacco.      Counseling Resources:  ATP IV Guidelines  Pooled Cohorts Equation Calculator  Breast Cancer Risk Calculator  FRAX Risk Assessment  ICSI Preventive Guidelines  Dietary Guidelines for Americans, 2010  USDA's MyPlate  ASA Prophylaxis  Lung CA Screening    Bibi Gamble MD  [facility] [place]  "

## 2019-02-01 NOTE — PATIENT INSTRUCTIONS
Please call radiology department at [phone] to schedule your test at [facility].    Follow-up in 6 months for anxiety and phentermine check.    Please come fasting if able.      Preventive Health Recommendations  Female Ages 26 - 39  Yearly exam:   See your health care provider every year in order to    Review health changes.     Discuss preventive care.      Review your medicines if you your doctor has prescribed any.    Until age 30: Get a Pap test every three years (more often if you have had an abnormal result).    After age 30: Talk to your doctor about whether you should have a Pap test every 3 years or have a Pap test with HPV screening every 5 years.   You do not need a Pap test if your uterus was removed (hysterectomy) and you have not had cancer.  You should be tested each year for STDs (sexually transmitted diseases), if you're at risk.   Talk to your provider about how often to have your cholesterol checked.  If you are at risk for diabetes, you should have a diabetes test (fasting glucose).  Shots: Get a flu shot each year. Get a tetanus shot every 10 years.   Nutrition:     Eat at least 5 servings of fruits and vegetables each day.    Eat whole-grain bread, whole-wheat pasta and brown rice instead of white grains and rice.    Get adequate Calcium and Vitamin D.     Lifestyle    Exercise at least 150 minutes a week (30 minutes a day, 5 days of the week). This will help you control your weight and prevent disease.    Limit alcohol to one drink per day.    No smoking.     Wear sunscreen to prevent skin cancer.    See your dentist every six months for an exam and cleaning.

## 2019-02-01 NOTE — LETTER
To whom it may concern:    Kate Uribe is under my professional medical care for obesity.  She would benefit from a gym membership.    Please contact me with any additional questions or concerns.      Sincerely,        Bibi Gamlbe MD  Internal Medicine/Pediatrics  [facility]

## 2019-02-04 ENCOUNTER — HOSPITAL ENCOUNTER (OUTPATIENT)
Dept: MAMMOGRAPHY | Facility: CLINIC | Age: 29
End: 2019-02-04
Attending: PEDIATRICS
Payer: COMMERCIAL

## 2019-02-04 ENCOUNTER — HOSPITAL ENCOUNTER (OUTPATIENT)
Dept: MAMMOGRAPHY | Facility: CLINIC | Age: 29
Discharge: HOME | End: 2019-02-04
Attending: PEDIATRICS | Admitting: PEDIATRICS
Payer: COMMERCIAL

## 2019-02-04 DIAGNOSIS — N63.15 BREAST LUMP ON RIGHT SIDE AT 9 O'CLOCK POSITION: ICD-10-CM

## 2019-02-04 PROCEDURE — 77066 DX MAMMO INCL CAD BI: CPT

## 2019-02-04 PROCEDURE — G0279 TOMOSYNTHESIS, MAMMO: HCPCS

## 2019-02-04 PROCEDURE — 76642 ULTRASOUND BREAST LIMITED: CPT | Mod: RT

## 2019-02-05 LAB
COPATH REPORT: NORMAL
PAP: NORMAL

## 2019-02-06 LAB
FINAL DIAGNOSIS: NORMAL
HPV HR 12 DNA CVX QL NAA+PROBE: NEGATIVE
HPV16 DNA SPEC QL NAA+PROBE: NEGATIVE
HPV18 DNA SPEC QL NAA+PROBE: NEGATIVE
SPECIMEN DESCRIPTION: NORMAL
SPECIMEN SOURCE CVX/VAG CYTO: NORMAL

## 2019-02-08 ENCOUNTER — MYC MEDICAL ADVICE (OUTPATIENT)
Dept: PEDIATRICS | Facility: CLINIC | Age: 29
End: 2019-02-08

## 2019-02-08 ENCOUNTER — E-VISIT (OUTPATIENT)
Dept: PEDIATRICS | Facility: CLINIC | Age: 29
End: 2019-02-08
Payer: COMMERCIAL

## 2019-02-08 DIAGNOSIS — F41.9 ANXIETY: Primary | ICD-10-CM

## 2019-02-08 PROCEDURE — 99444 ZZC PHYSICIAN ONLINE EVALUATION & MANAGEMENT SERVICE: CPT | Performed by: PEDIATRICS

## 2019-02-08 RX ORDER — BUSPIRONE HYDROCHLORIDE 5 MG/1
5 TABLET ORAL 2 TIMES DAILY
Qty: 60 TABLET | Refills: 0 | Status: SHIPPED | OUTPATIENT
Start: 2019-02-08 | End: 2019-04-17

## 2019-02-08 ASSESSMENT — ANXIETY QUESTIONNAIRES
6. BECOMING EASILY ANNOYED OR IRRITABLE: NEARLY EVERY DAY
7. FEELING AFRAID AS IF SOMETHING AWFUL MIGHT HAPPEN: MORE THAN HALF THE DAYS
7. FEELING AFRAID AS IF SOMETHING AWFUL MIGHT HAPPEN: MORE THAN HALF THE DAYS
2. NOT BEING ABLE TO STOP OR CONTROL WORRYING: MORE THAN HALF THE DAYS
GAD7 TOTAL SCORE: 15
3. WORRYING TOO MUCH ABOUT DIFFERENT THINGS: MORE THAN HALF THE DAYS
5. BEING SO RESTLESS THAT IT IS HARD TO SIT STILL: SEVERAL DAYS
4. TROUBLE RELAXING: NEARLY EVERY DAY
GAD7 TOTAL SCORE: 15
1. FEELING NERVOUS, ANXIOUS, OR ON EDGE: MORE THAN HALF THE DAYS

## 2019-02-08 NOTE — TELEPHONE ENCOUNTER
Jarredd with Dr. [id number] required. Patient informed.  Lorena Guzman RN  Message handled by Nurse Triage with Jarred - provider name: PCP.

## 2019-02-09 ASSESSMENT — ANXIETY QUESTIONNAIRES: GAD7 TOTAL SCORE: 15

## 2019-03-13 ENCOUNTER — MYC MEDICAL ADVICE (OUTPATIENT)
Dept: PEDIATRICS | Facility: CLINIC | Age: 29
End: 2019-03-13

## 2019-03-13 DIAGNOSIS — Z01.84 IMMUNITY STATUS TESTING: Primary | ICD-10-CM

## 2019-03-13 NOTE — LETTER
[facility]  [address] [place] [address]  [phone]      2019    Kate Uribe                                                                                                                                  1990                      [address] [facility] [address]              To Whom It May Concern,    Kate is under my professional care. Her last preventive healthcare visit was on 19 and her last Tdap was on 18. Please contact the clinic with any questions.          Sincerely,          Bibi Gamble MD

## 2019-03-14 ENCOUNTER — MYC MEDICAL ADVICE (OUTPATIENT)
Dept: PEDIATRICS | Facility: CLINIC | Age: 29
End: 2019-03-14

## 2019-03-14 DIAGNOSIS — Z11.1 SCREENING EXAMINATION FOR PULMONARY TUBERCULOSIS: Primary | ICD-10-CM

## 2019-03-14 NOTE — TELEPHONE ENCOUNTER
Orders were entered for titers as requested p/ patient. Letter was typed and printed for patient to bring to school. Letter is at .

## 2019-03-14 NOTE — TELEPHONE ENCOUNTER
Waiting for patient to respond which TB test she is asking for.  MC sent to patient.  Order pended for TB quantiferon gold.  Lorena Guzman RN  Message handled by Nurse Triage.

## 2019-03-22 DIAGNOSIS — Z01.84 IMMUNITY STATUS TESTING: ICD-10-CM

## 2019-03-22 DIAGNOSIS — Z11.1 SCREENING EXAMINATION FOR PULMONARY TUBERCULOSIS: ICD-10-CM

## 2019-03-22 PROCEDURE — 86481 TB AG RESPONSE T-CELL SUSP: CPT | Performed by: PEDIATRICS

## 2019-03-22 PROCEDURE — 86787 VARICELLA-ZOSTER ANTIBODY: CPT | Performed by: PEDIATRICS

## 2019-03-22 PROCEDURE — 36415 COLL VENOUS BLD VENIPUNCTURE: CPT | Performed by: PEDIATRICS

## 2019-03-24 ENCOUNTER — OFFICE VISIT (OUTPATIENT)
Dept: URGENT CARE | Facility: URGENT CARE | Age: 29
End: 2019-03-24
Payer: COMMERCIAL

## 2019-03-24 VITALS
TEMPERATURE: 99 F | SYSTOLIC BLOOD PRESSURE: 116 MMHG | WEIGHT: 251 LBS | HEART RATE: 103 BPM | OXYGEN SATURATION: 97 % | BODY MASS INDEX: 43.08 KG/M2 | DIASTOLIC BLOOD PRESSURE: 76 MMHG

## 2019-03-24 DIAGNOSIS — J10.1 INFLUENZA A: Primary | ICD-10-CM

## 2019-03-24 LAB
FLUAV+FLUBV AG SPEC QL: NEGATIVE
FLUAV+FLUBV AG SPEC QL: POSITIVE
SPECIMEN SOURCE: ABNORMAL
VZV IGG SER QL IA: 5.1 AI (ref 0–0.8)

## 2019-03-24 PROCEDURE — 99213 OFFICE O/P EST LOW 20 MIN: CPT | Performed by: PHYSICIAN ASSISTANT

## 2019-03-24 PROCEDURE — 87804 INFLUENZA ASSAY W/OPTIC: CPT | Performed by: PHYSICIAN ASSISTANT

## 2019-03-24 RX ORDER — OSELTAMIVIR PHOSPHATE 75 MG/1
75 CAPSULE ORAL DAILY
Qty: 10 CAPSULE | Refills: 0 | Status: SHIPPED | OUTPATIENT
Start: 2019-03-24 | End: 2019-04-03

## 2019-03-24 RX ORDER — FLUTICASONE PROPIONATE 110 UG/1
2 AEROSOL, METERED RESPIRATORY (INHALATION) 2 TIMES DAILY
Qty: 1 INHALER | Refills: 1 | Status: SHIPPED | OUTPATIENT
Start: 2019-03-24 | End: 2019-07-12

## 2019-03-24 NOTE — PROGRESS NOTES
SUBJECTIVE:  Kate Uribe is a 29 year old female who presents to the clinic today with a chief complaint of cough  for 1 day(s).  Her cough is described as productive of yellow sputum.    The patient's symptoms are moderate and worsening.  Associated symptoms include nasal congestion and sore throat. The patient's symptoms are exacerbated by no particular triggers  Patient has been using ibuprofen to improve symptoms.    Past Medical History:   Diagnosis Date     Anemia      Asthma      Crohn disease (H)      Heart murmur      Mental disorder     anxiety     Noninfectious ileitis     Crohn's disease     Preeclampsia        Current Outpatient Medications   Medication Sig Dispense Refill     albuterol (PROAIR HFA/PROVENTIL HFA/VENTOLIN HFA) 108 (90 Base) MCG/ACT inhaler Inhale 2 puffs into the lungs every 6 hours 1 Inhaler 11     AzaTHIOprine (IMURAN PO) Take 50 mg by mouth 2 times daily        busPIRone (BUSPAR) 5 MG tablet Take 1 tablet (5 mg) by mouth 2 times daily 60 tablet 0     Cholecalciferol (VITAMIN D) 2000 units CAPS        FLUoxetine (PROZAC) 40 MG capsule Take 2 capsules (80 mg) by mouth daily 180 capsule 1     HydrOXYzine Pamoate (VISTARIL PO) Take 50 mg by mouth once       InFLIXimab (REMICADE IV) Inject 500 mg into the vein Every 8 weeks       Magnesium 500 MG CAPS        phentermine (ADIPEX-P) 30 MG capsule Take 1 capsule (30 mg) by mouth every morning 30 capsule 5     phentermine 15 MG capsule Take 1 capsule (15 mg) by mouth every morning 30 capsule 0     fluticasone (FLOVENT HFA) 110 MCG/ACT Inhaler Inhale 2 puffs into the lungs 2 times daily (Patient not taking: Reported on 3/24/2019) 1 Inhaler 1       Social History     Tobacco Use     Smoking status: Never Smoker     Smokeless tobacco: Never Used   Substance Use Topics     Alcohol use: Yes     Alcohol/week: 0.0 - 1.2 oz     Comment: Social       ROS  Review of systems negative except as stated above.    OBJECTIVE:  /76   Pulse 103    Temp 99  F (37.2  C) (Tympanic)   Wt 113.9 kg (251 lb)   SpO2 97%   BMI 43.08 kg/m    GENERAL APPEARANCE: healthy, alert and no distress  EYES: EOMI,  PERRL, conjunctiva clear  HENT: ear canals and TM's normal.  Nose and mouth without ulcers, erythema or lesions  RESP: lungs clear to auscultation - no rales, rhonchi or wheezes  CV: regular rates and rhythm, normal S1 S2, no murmur noted    Results for orders placed or performed in visit on 03/24/19   Influenza A/B antigen   Result Value Ref Range    Influenza A/B Agn Specimen Nasal     Influenza A Positive (A) NEG^Negative    Influenza B Negative NEG^Negative         ASSESSMENT:   (J10.1) Influenza A  (primary encounter diagnosis)  Plan: Influenza A/B antigen, oseltamivir (TAMIFLU) 75        MG capsule, fluticasone (FLOVENT HFA) 110         MCG/ACT inhaler      Follow up with PCP if symptoms worsen or fail to improve

## 2019-03-25 ENCOUNTER — MYC MEDICAL ADVICE (OUTPATIENT)
Dept: PEDIATRICS | Facility: CLINIC | Age: 29
End: 2019-03-25

## 2019-03-25 LAB
GAMMA INTERFERON BACKGROUND BLD IA-ACNC: 0.03 IU/ML
M TB IFN-G BLD-IMP: NEGATIVE
M TB IFN-G CD4+ BCKGRND COR BLD-ACNC: >10 IU/ML
MITOGEN IGNF BCKGRD COR BLD-ACNC: 0 IU/ML
MITOGEN IGNF BCKGRD COR BLD-ACNC: 0 IU/ML

## 2019-03-25 NOTE — TELEPHONE ENCOUNTER
Patient has attached form to be completed by provider.  Please print and prep form for provider to complete  Samia BRANDON RN - Triage  [facility]

## 2019-04-17 ENCOUNTER — MYC MEDICAL ADVICE (OUTPATIENT)
Dept: PEDIATRICS | Facility: CLINIC | Age: 29
End: 2019-04-17

## 2019-04-17 ENCOUNTER — MYC REFILL (OUTPATIENT)
Dept: PEDIATRICS | Facility: CLINIC | Age: 29
End: 2019-04-17

## 2019-04-17 DIAGNOSIS — F41.9 ANXIETY: ICD-10-CM

## 2019-04-17 DIAGNOSIS — B00.1 COLD SORE: ICD-10-CM

## 2019-04-17 DIAGNOSIS — E66.01 OBESITY, MORBID, BMI 40.0-49.9 (H): ICD-10-CM

## 2019-04-17 RX ORDER — VALACYCLOVIR HYDROCHLORIDE 1 G/1
2000 TABLET, FILM COATED ORAL 2 TIMES DAILY
Qty: 4 TABLET | Refills: 1 | Status: SHIPPED | OUTPATIENT
Start: 2019-04-17 | End: 2019-05-14

## 2019-04-17 RX ORDER — PHENTERMINE HYDROCHLORIDE 15 MG/1
15 CAPSULE ORAL EVERY MORNING
Qty: 30 CAPSULE | Refills: 0 | Status: CANCELLED | OUTPATIENT
Start: 2019-04-17

## 2019-04-17 NOTE — TELEPHONE ENCOUNTER
Request for Valtrex for dx: cold sore    Last written 3/6/17  Creatinine   Date Value Ref Range Status   09/23/2018 0.71 0.52 - 1.04 mg/dL Final     BP Readings from Last 3 Encounters:   03/24/19 116/76   02/01/19 110/64   12/07/18 118/72     Routing refill request to provider for review/approval because:  A break in medication    Lorena Guzman, ABEL  Message handled by Nurse Triage.

## 2019-04-18 RX ORDER — BUSPIRONE HYDROCHLORIDE 5 MG/1
5 TABLET ORAL 2 TIMES DAILY
Qty: 60 TABLET | Refills: 5 | Status: SHIPPED | OUTPATIENT
Start: 2019-04-18 | End: 2019-07-12

## 2019-04-18 NOTE — TELEPHONE ENCOUNTER
"  Lactation warning received with attempt to fill.  My chart message sent to patient to clarify warning.  Samia BRANDON RN - Triage  [facility]       busPIRone (BUSPAR) 5 MG tablet 60 tablet 0     Sig: Take 1 tablet (5 mg) by mouth 2 times daily       Atypical Antidepressants Protocol Passed - 4/17/2019  1:41 PM        Passed - Recent (12 mo) or future (30 days) visit within the authorizing provider's specialty     Patient had office visit in the last 12 months or has a visit in the next 30 days with authorizing provider or within the authorizing provider's specialty.  See \"Patient Info\" tab in inbasket, or \"Choose Columns\" in Meds & Orders section of the refill encounter.              Passed - Medication active on med list        Passed - Patient is age 18 or older        Passed - No active pregnancy on record        Passed - No positive pregnancy test in past 12 mos          "

## 2019-04-18 NOTE — TELEPHONE ENCOUNTER
My chart message received.  Patient not breastfeeding.  Medication refilled per [facility] protocol  Samia BRANDON RN - Triage  [facility]

## 2019-07-12 ENCOUNTER — ANCILLARY PROCEDURE (OUTPATIENT)
Dept: GENERAL RADIOLOGY | Facility: CLINIC | Age: 29
End: 2019-07-12
Attending: PHYSICIAN ASSISTANT
Payer: COMMERCIAL

## 2019-07-12 ENCOUNTER — OFFICE VISIT (OUTPATIENT)
Dept: URGENT CARE | Facility: URGENT CARE | Age: 29
End: 2019-07-12
Payer: COMMERCIAL

## 2019-07-12 VITALS
OXYGEN SATURATION: 98 % | DIASTOLIC BLOOD PRESSURE: 72 MMHG | TEMPERATURE: 97 F | WEIGHT: 251 LBS | HEART RATE: 78 BPM | SYSTOLIC BLOOD PRESSURE: 110 MMHG | BODY MASS INDEX: 43.08 KG/M2

## 2019-07-12 DIAGNOSIS — M77.9 TENDONITIS: Primary | ICD-10-CM

## 2019-07-12 DIAGNOSIS — S99.921A INJURY OF RIGHT FOOT, INITIAL ENCOUNTER: ICD-10-CM

## 2019-07-12 PROCEDURE — 73630 X-RAY EXAM OF FOOT: CPT | Mod: RT

## 2019-07-12 PROCEDURE — 99213 OFFICE O/P EST LOW 20 MIN: CPT | Performed by: PHYSICIAN ASSISTANT

## 2019-07-12 NOTE — PROGRESS NOTES
SUBJECTIVE:  Chief Complaint   Patient presents with     Urgent Care     Musculoskeletal Problem     R foot injury      Kate Uribe is a 29 year old female who presents with a chief complaint of right foot injury.   Symptoms began 5 weeks ago, she fell out a scooter and rolled down the driveway. She worked a long shift 2 days ago and has had increasing pain since that time. She has been icing and elevating to help with symptoms. She denies having numbness, tingling or icy feeling into her extremities.      Past Medical History:   Diagnosis Date     Anemia      Asthma      Crohn disease (H)      Heart murmur      Mental disorder     anxiety     Noninfectious ileitis     Crohn's disease     Preeclampsia      Current Outpatient Medications   Medication Sig Dispense Refill     albuterol (PROAIR HFA/PROVENTIL HFA/VENTOLIN HFA) 108 (90 Base) MCG/ACT inhaler Inhale 2 puffs into the lungs every 6 hours 1 Inhaler 11     AzaTHIOprine (IMURAN PO) Take 50 mg by mouth 2 times daily        FLUoxetine (PROZAC) 40 MG capsule Take 2 capsules (80 mg) by mouth daily 180 capsule 1     HydrOXYzine Pamoate (VISTARIL PO) Take 50 mg by mouth once       InFLIXimab (REMICADE IV) Inject 500 mg into the vein Every 8 weeks       phentermine (ADIPEX-P) 30 MG capsule Take 1 capsule (30 mg) by mouth every morning 30 capsule 5     Cholecalciferol (VITAMIN D) 2000 units CAPS        Magnesium 500 MG CAPS        Social History     Tobacco Use     Smoking status: Never Smoker     Smokeless tobacco: Never Used   Substance Use Topics     Alcohol use: Yes     Alcohol/week: 0.0 - 1.2 oz     Comment: Social       ROS:  Review of systems negative except as stated above.    EXAM:   /72   Pulse 78   Temp 97  F (36.1  C)   Wt 113.9 kg (251 lb)   SpO2 98%   BMI 43.08 kg/m    M/S Exam:Right foot is mildly tender over 5th MT. No bruising, erythema or swelling noted.   GENERAL APPEARANCE: healthy, alert and no distress  EXTREMITIES: peripheral  pulses normal  SKIN: no suspicious lesions or rashes  NEURO: Normal strength and tone, sensory exam grossly normal, mentation intact and speech normal    X-RAY was done --  No fracture noted    ASSESSMENT / PLAN:  1. Tendonitis of right foot  Symptoms likely started as a sprain and now a tendonitis.  I encouraged RISE treatment and stretching. Follow-up with pods if symptoms fail to improve.     Radha Rutledge PA-C

## 2019-07-20 ENCOUNTER — E-VISIT (OUTPATIENT)
Dept: PEDIATRICS | Facility: CLINIC | Age: 29
End: 2019-07-20
Payer: COMMERCIAL

## 2019-07-20 DIAGNOSIS — F41.0 PANIC ATTACK: Primary | ICD-10-CM

## 2019-07-20 PROCEDURE — 99444 ZZC PHYSICIAN ONLINE EVALUATION & MANAGEMENT SERVICE: CPT | Performed by: PEDIATRICS

## 2019-07-22 RX ORDER — LORAZEPAM 0.5 MG/1
0.5 TABLET ORAL EVERY 8 HOURS PRN
Qty: 10 TABLET | Refills: 0 | Status: SHIPPED | OUTPATIENT
Start: 2019-07-22 | End: 2020-09-28

## 2019-07-24 ENCOUNTER — OFFICE VISIT (OUTPATIENT)
Dept: URGENT CARE | Facility: URGENT CARE | Age: 29
End: 2019-07-24
Payer: COMMERCIAL

## 2019-07-24 VITALS — SYSTOLIC BLOOD PRESSURE: 100 MMHG | DIASTOLIC BLOOD PRESSURE: 60 MMHG

## 2019-07-24 DIAGNOSIS — R07.9 CHEST PAIN, UNSPECIFIED TYPE: Primary | ICD-10-CM

## 2019-07-24 PROCEDURE — 93000 ELECTROCARDIOGRAM COMPLETE: CPT | Performed by: FAMILY MEDICINE

## 2019-07-25 NOTE — PROGRESS NOTES
S: studying and doing math (algebra), developed chest pain, lasted for couple of hours.  Tried GI cocktail without relief.  H/o anxiety, stressors, h/o GERD    O: /60 (BP Location: Right arm)     EKG - NSR, rate 84, no ST c/w ischemia    A/P:Atypical chest pain - most likely due to stressors and anxiety.    No charge for visit.  Tyrese Damon MD  July 24, 2019 9:50 PM

## 2019-07-26 ENCOUNTER — E-VISIT (OUTPATIENT)
Dept: PEDIATRICS | Facility: CLINIC | Age: 29
End: 2019-07-26
Payer: COMMERCIAL

## 2019-07-26 DIAGNOSIS — N30.00 ACUTE CYSTITIS WITHOUT HEMATURIA: Primary | ICD-10-CM

## 2019-07-26 DIAGNOSIS — R30.0 DYSURIA: ICD-10-CM

## 2019-07-26 DIAGNOSIS — R30.0 DYSURIA: Primary | ICD-10-CM

## 2019-07-26 LAB
ALBUMIN UR-MCNC: NEGATIVE MG/DL
APPEARANCE UR: CLEAR
BACTERIA #/AREA URNS HPF: ABNORMAL /HPF
BILIRUB UR QL STRIP: NEGATIVE
COLOR UR AUTO: YELLOW
GLUCOSE UR STRIP-MCNC: NEGATIVE MG/DL
HGB UR QL STRIP: ABNORMAL
KETONES UR STRIP-MCNC: NEGATIVE MG/DL
LEUKOCYTE ESTERASE UR QL STRIP: NEGATIVE
MUCOUS THREADS #/AREA URNS LPF: PRESENT /LPF
NITRATE UR QL: NEGATIVE
NON-SQ EPI CELLS #/AREA URNS LPF: ABNORMAL /LPF
PH UR STRIP: 6 PH (ref 5–7)
RBC #/AREA URNS AUTO: ABNORMAL /HPF
SOURCE: ABNORMAL
SP GR UR STRIP: 1.02 (ref 1–1.03)
UROBILINOGEN UR STRIP-ACNC: 0.2 EU/DL (ref 0.2–1)
WBC #/AREA URNS AUTO: ABNORMAL /HPF

## 2019-07-26 PROCEDURE — 81001 URINALYSIS AUTO W/SCOPE: CPT | Performed by: PEDIATRICS

## 2019-07-26 PROCEDURE — 87086 URINE CULTURE/COLONY COUNT: CPT | Performed by: PEDIATRICS

## 2019-07-26 PROCEDURE — 99444 ZZC PHYSICIAN ONLINE EVALUATION & MANAGEMENT SERVICE: CPT | Performed by: PEDIATRICS

## 2019-07-26 RX ORDER — NITROFURANTOIN 25; 75 MG/1; MG/1
100 CAPSULE ORAL 2 TIMES DAILY
Qty: 14 CAPSULE | Refills: 0 | Status: SHIPPED | OUTPATIENT
Start: 2019-07-26 | End: 2020-09-28

## 2019-07-27 LAB
BACTERIA SPEC CULT: NORMAL
SPECIMEN SOURCE: NORMAL

## 2019-08-21 DIAGNOSIS — R19.7 DIARRHEA: Primary | ICD-10-CM

## 2019-09-20 ENCOUNTER — ALLIED HEALTH/NURSE VISIT (OUTPATIENT)
Dept: NURSING | Facility: CLINIC | Age: 29
End: 2019-09-20
Payer: COMMERCIAL

## 2019-09-20 DIAGNOSIS — Z23 NEED FOR PROPHYLACTIC VACCINATION AND INOCULATION AGAINST INFLUENZA: Primary | ICD-10-CM

## 2019-09-20 PROCEDURE — 90471 IMMUNIZATION ADMIN: CPT

## 2019-09-20 PROCEDURE — 90686 IIV4 VACC NO PRSV 0.5 ML IM: CPT

## 2019-09-20 PROCEDURE — 99207 ZZC NO CHARGE NURSE ONLY: CPT

## 2019-10-02 ENCOUNTER — MYC REFILL (OUTPATIENT)
Dept: PEDIATRICS | Facility: CLINIC | Age: 29
End: 2019-10-02

## 2019-10-02 DIAGNOSIS — E66.01 OBESITY, MORBID, BMI 40.0-49.9 (H): ICD-10-CM

## 2019-10-02 NOTE — LETTER
[facility]  [address]  [address]  [place] [address]  Phone: [phone]  Fax: [phone]        October 9, 2019      Kate DARYL Jojo                                                                                                                                [address] [facility] [address]            Dear Ms. Uribe,    We are concerned about your health care.  We recently provided you with a medication refill.  Many medications require routine follow-up with your Doctor.      At this time we ask that: You are overdue for follow up for pherntermine.   I see you are seeing endo in a few weeks, are you seeing them for weight?  If so, please  ask them to take over prescribing this, otherwise you will need to make a follow up appointment with Florina Gamble MD      Your prescription: Has been refilled for 1 month so you may have time for the above noted follow-up.      Thank you,      Your [facility]      .

## 2019-10-02 NOTE — TELEPHONE ENCOUNTER
Routing refill request to provider for review/approval because:  Drug not on the [facility] refill protocol   Fabi Berkowitz BSN, RN

## 2019-10-03 RX ORDER — PHENTERMINE HYDROCHLORIDE 30 MG/1
30 CAPSULE ORAL EVERY MORNING
Qty: 30 CAPSULE | Refills: 0 | Status: SHIPPED | OUTPATIENT
Start: 2019-10-03 | End: 2019-11-08

## 2019-10-03 NOTE — TELEPHONE ENCOUNTER
30 days only.    Patient overdue for follow up for pherntermine.   I see she is seeing endo in a few weeks, maybe she is seeing them for weight?  If so, please let her know to ask them to take over prescribing this, otherwise I need to see her.    Bibi aGmble MD  Internal Medicine/Pediatrics  [facility]

## 2019-10-07 NOTE — TELEPHONE ENCOUNTER
Left message for patient to call back.         30 days only.     Patient overdue for follow up for pherntermine.   I see she is seeing endo in a few weeks, maybe she is seeing them for weight?  If so, please let her know to ask them to take over prescribing this, otherwise I need to see her.     Bibi Gamble MD  Internal Medicine/Pediatrics  [facility]    Jennifer Sanon, [facility]

## 2019-10-18 ENCOUNTER — E-VISIT (OUTPATIENT)
Dept: PEDIATRICS | Facility: CLINIC | Age: 29
End: 2019-10-18
Payer: COMMERCIAL

## 2019-10-18 ENCOUNTER — TELEPHONE (OUTPATIENT)
Dept: SURGERY | Facility: CLINIC | Age: 29
End: 2019-10-18

## 2019-10-18 DIAGNOSIS — E66.01 OBESITY, MORBID, BMI 40.0-49.9 (H): Primary | ICD-10-CM

## 2019-10-18 PROCEDURE — 99444 ZZC PHYSICIAN ONLINE EVALUATION & MANAGEMENT SERVICE: CPT | Performed by: PEDIATRICS

## 2019-10-18 NOTE — TELEPHONE ENCOUNTER
Patient interested in weight loss surgery     Kate DARYL Uribe        Instructed to check with insurance company regarding exclusions prior to first appt.     Scheduled to see Aneta Mcelroy NP at 2:00pm, followed by an appt with a dietitian at 3:00pm.     Left message: regarding appointment on Monday 10/21/2019.     Can find information on [employer] products at: [email or web address]    Instructed to view seminar and complete pre-visit [email or web address]     [phone] contact center phone number

## 2019-11-01 ENCOUNTER — MYC REFILL (OUTPATIENT)
Dept: PEDIATRICS | Facility: CLINIC | Age: 29
End: 2019-11-01

## 2019-11-01 DIAGNOSIS — F41.1 GAD (GENERALIZED ANXIETY DISORDER): ICD-10-CM

## 2019-11-04 RX ORDER — FLUOXETINE 40 MG/1
CAPSULE ORAL
Qty: 180 CAPSULE | Refills: 0 | Status: SHIPPED | OUTPATIENT
Start: 2019-11-04

## 2019-11-04 RX ORDER — FLUOXETINE 40 MG/1
80 CAPSULE ORAL DAILY
Qty: 180 CAPSULE | Refills: 1 | OUTPATIENT
Start: 2019-11-04

## 2019-11-08 DIAGNOSIS — E66.01 OBESITY, MORBID, BMI 40.0-49.9 (H): ICD-10-CM

## 2019-11-08 NOTE — TELEPHONE ENCOUNTER
Requested Prescriptions   Pending Prescriptions Disp Refills     phentermine (ADIPEX-P) 30 MG capsule [Pharmacy Med Name: PHENTERMINE HCL 30MG CAPS]  Last Written Prescription Date:  10/03/2019  Last Fill Quantity: 30 capsule,  # refills: 0   Last Office Visit: 2/1/2019 Bibi Gamble MD   Future Office Visit:      30 capsule 0     Sig: TAKE ONE CAPSULE BY MOUTH EVERY MORNING       There is no refill protocol information for this order

## 2019-11-12 RX ORDER — PHENTERMINE HYDROCHLORIDE 30 MG/1
CAPSULE ORAL
Qty: 30 CAPSULE | Refills: 0 | Status: SHIPPED | OUTPATIENT
Start: 2019-11-12 | End: 2020-09-28

## 2019-12-17 ENCOUNTER — OFFICE VISIT (OUTPATIENT)
Dept: DERMATOLOGY | Facility: CLINIC | Age: 29
End: 2019-12-17
Payer: COMMERCIAL

## 2019-12-17 DIAGNOSIS — D22.9 MULTIPLE NEVI: Primary | ICD-10-CM

## 2019-12-17 DIAGNOSIS — D84.9 IMMUNOSUPPRESSED STATUS (H): ICD-10-CM

## 2019-12-17 DIAGNOSIS — L81.2 EPHELIDES: ICD-10-CM

## 2019-12-17 PROCEDURE — 99203 OFFICE O/P NEW LOW 30 MIN: CPT | Performed by: DERMATOLOGY

## 2019-12-17 NOTE — LETTER
2019      RE: Kate Uribe  [address] [place] [address]                   Dermatology Clinic Note    Dermatology Problem List:  1. Crohn's on immunosuppression with AZA and infliximab  2. Ephelides   3. Benign pigmented nevi   4. Presumed eic on the L lateral canthus  5. Keratosis pilaris     Assessment and Plan:    1. Benign pigmented nevi: No lesions of concern. Sun protection recommended. Discussed ABCDEs of malignant melanoma. Given immunosuppressed status yearly skin check advised.     2. Presumed EIC on the L canthus. No treatment for now, will monitor    3.  Ephelides on sun exposed areas, marker of past sun injury. Sun protection advised.       RTC 1 year, sooner prn.     Thank you for involving me in this patient's care.     Cherie Ceron MD  Dermatology Staff    CC:     Bibi Gamble MD    ____________________________________________________________________________________________________________________________________________    CC: Patient presents with:  New Patient: np/spot near left eye and skin ck      HPI: Kate Uribe is a 29 year old female presenting for initial evaluation of nevi and bump on the L temple. No history of skin cancer. On immunosuppression for Crohns since 2016. Notes history of peeling sunburns. No tanning bed use. No family history of skin cancer.  Has bump by the L eye present for several years. No drainage. Stable in size.       Patient Active Problem List   Diagnosis     Obesity, morbid, BMI 40.0-49.9 (H)     Crohn's disease of small intestine without complication (H)     Mild intermittent asthma in adult without complication     Iron deficiency anemia, unspecified iron deficiency anemia type     Anxiety     Gastroesophageal reflux disease without esophagitis     Indication for care in labor or delivery      delivery delivered     Mild single current episode of major depressive disorder (H)     MILA (generalized anxiety disorder)  OB Note  25 4/7 weeks, triplet pregnancy hospitalized since 23 0/7 weeks with shortened cervix(18)  S- not aware of contractions; had mucous discharge this AM with 2 hours of contractions q 3-5 minutes and now irregular.  Denies bleeding or leaking of fluid.  Good fetal activity.  O- afebrile   P=80  RR=18  EO=135/65  Abdomen-soft/no palpable contractions  Ext-nontender  [place]- 1-2 contractions per hour since 1000  FHTs- 140 with accels; 135 with accels; 135 with accels  BPPs this AM- results pending  Cervix reexamined- 1+ cm/80%/high  A/P:  1. 25 4/7 weeks triplet pregnancy, triamniotic/dichorionic placentation(monochorionic twin pair)  2. Shortened cervix-   Ultrasound on 3/2/2018 showed 1.5 cm with funneling to the level of the external os so suspended further vaginal probe ultrasounds and will now reassess with manual exams if needed.  Current exam to assess uterine activity this AM showed 1+cm/80%/high.  3. Continue vaginal progesterone 200 mg twice a day  4. Continue q 4 hour monitoring as no further contractions noted.  5. MFM consult- BPPs on Tues and Friday.  Will check growth next week.  6. S/P BTMZ x 2( and )-discussed with Dr Branch today- would repeat steroids if impending delivery and use magnesium sulfate again for tocolysis and neuroprotection.  Would reserve indomethacin use for after that if needed.   7.  Received 12 hours of Magnesium for neuroprotection   - , then began hans and Magnesium restarted for tocolysis  and continued until  PM (24 hours after 2nd dose of BTMZ).   8.  NICU ( Dr. Chip Jewell) consult with patient and her  on .  9.  Will be  section for delivery.  Consent signed on .   10.  Possible use of Indomethacin (50 mg MN followed by 25 mg MN q 6 hours for total 48 hours) discussed with patient by Dr. Branch, Dr. Lemons, and myself on .   11.  Lovenox prophylaxis started on  AM- held temporarily this AM while        Allergies   Allergen Reactions     Penicillins Hives         Current Outpatient Medications   Medication     albuterol (PROAIR HFA/PROVENTIL HFA/VENTOLIN HFA) 108 (90 Base) MCG/ACT inhaler     AzaTHIOprine (IMURAN PO)     FLUoxetine (PROZAC) 40 MG capsule     InFLIXimab (REMICADE IV)     nitroFURantoin macrocrystal-monohydrate (MACROBID) 100 MG capsule     phentermine (ADIPEX-P) 30 MG capsule     Cholecalciferol (VITAMIN D) 2000 units CAPS     HydrOXYzine Pamoate (VISTARIL PO)     LORazepam (ATIVAN) 0.5 MG tablet     Magnesium 500 MG CAPS     No current facility-administered medications for this visit.        Family History   Problem Relation Age of Onset     Diabetes Mother      Hypertension Mother      Hyperlipidemia Mother      Obesity Mother      Gallbladder Disease Mother      Mental Illness Mother      Thyroid Disease Mother      Colon Cancer Maternal Grandfather 70     Hypertension Maternal Grandmother      Hyperlipidemia Maternal Grandmother      Chronic Obstructive Pulmonary Disease Maternal Grandmother      Pulmonary Embolism Maternal Grandmother      Hypertension Sister      Hyperlipidemia Sister      Obesity Sister      Mental Illness Sister        Social History     Tobacco Use     Smoking status: Never Smoker     Smokeless tobacco: Never Used   Substance Use Topics     Alcohol use: Yes     Alcohol/week: 0.0 - 2.0 standard drinks     Comment: Social     Drug use: No           ROS: Feeling well without other skin concerns.     EXAM:  There were no vitals taken for this visit.  GEN: Alert, no distress  HEENT: Conjunctiva clear.   PULM: Breathing comfortably on RA  CV: Extrem warm and well perfused  ABD: No distension  SKIN: Exam of the face, neck, chest, abdomen, back, arms, legs, hands, feet, buttocks. Normal except as follows:  --3 mm brown macule on the L parietal scalp  --Light brown macules across the nose, cheeks, upper back, upper chest, shoulders  --Follicularly based hyperkeratotic papules  assessing contractions.  12.  Anesthesia consult 3/7/2018 with Dr. Hearn re: plan with regard to Lovenox.    [Recommended general anesthesia if less than 12 hours from Lovenox dose - currently daily at 0900. If more than 12 hours okay to use spinal anesthesia]                      on the lateral arms and thighs  --Scattered light brown slightly raised papules on the back with indistinct pigment at the periphery.   --R upper arm with approx 8 mm med brown macule with globular network  --1-2 mm medium brown macules on the legs  --6 mm brown macule on the L buttock  --L lateral canthus with approx 6 mm subcutaneous mobile papule        Cherie Ceron MD

## 2019-12-17 NOTE — PROGRESS NOTES
Dermatology Clinic Note    Dermatology Problem List:  1. Crohn's on immunosuppression with AZA and infliximab  2. Ephelides   3. Benign pigmented nevi   4. Presumed eic on the L lateral canthus  5. Keratosis pilaris     Assessment and Plan:    1. Benign pigmented nevi: No lesions of concern. Sun protection recommended. Discussed ABCDEs of malignant melanoma. Given immunosuppressed status yearly skin check advised.     2. Presumed EIC on the L canthus. No treatment for now, will monitor    3.  Ephelides on sun exposed areas, marker of past sun injury. Sun protection advised.       RTC 1 year, sooner prn.     Thank you for involving me in this patient's care.     Cherie Ceron MD  Dermatology Staff    CC:     Bibi Gamble MD    ____________________________________________________________________________________________________________________________________________    CC: Patient presents with:  New Patient: np/spot near left eye and skin ck      HPI: Kate Uribe is a 29 year old female presenting for initial evaluation of nevi and bump on the L temple. No history of skin cancer. On immunosuppression for Crohns since 2016. Notes history of peeling sunburns. No tanning bed use. No family history of skin cancer.  Has bump by the L eye present for several years. No drainage. Stable in size.       Patient Active Problem List   Diagnosis     Obesity, morbid, BMI 40.0-49.9 (H)     Crohn's disease of small intestine without complication (H)     Mild intermittent asthma in adult without complication     Iron deficiency anemia, unspecified iron deficiency anemia type     Anxiety     Gastroesophageal reflux disease without esophagitis     Indication for care in labor or delivery      delivery delivered     Mild single current episode of major depressive disorder (H)     MILA (generalized anxiety disorder)       Allergies   Allergen Reactions     Penicillins Hives         Current Outpatient  Medications   Medication     albuterol (PROAIR HFA/PROVENTIL HFA/VENTOLIN HFA) 108 (90 Base) MCG/ACT inhaler     AzaTHIOprine (IMURAN PO)     FLUoxetine (PROZAC) 40 MG capsule     InFLIXimab (REMICADE IV)     nitroFURantoin macrocrystal-monohydrate (MACROBID) 100 MG capsule     phentermine (ADIPEX-P) 30 MG capsule     Cholecalciferol (VITAMIN D) 2000 units CAPS     HydrOXYzine Pamoate (VISTARIL PO)     LORazepam (ATIVAN) 0.5 MG tablet     Magnesium 500 MG CAPS     No current facility-administered medications for this visit.        Family History   Problem Relation Age of Onset     Diabetes Mother      Hypertension Mother      Hyperlipidemia Mother      Obesity Mother      Gallbladder Disease Mother      Mental Illness Mother      Thyroid Disease Mother      Colon Cancer Maternal Grandfather 70     Hypertension Maternal Grandmother      Hyperlipidemia Maternal Grandmother      Chronic Obstructive Pulmonary Disease Maternal Grandmother      Pulmonary Embolism Maternal Grandmother      Hypertension Sister      Hyperlipidemia Sister      Obesity Sister      Mental Illness Sister        Social History     Tobacco Use     Smoking status: Never Smoker     Smokeless tobacco: Never Used   Substance Use Topics     Alcohol use: Yes     Alcohol/week: 0.0 - 2.0 standard drinks     Comment: Social     Drug use: No           ROS: Feeling well without other skin concerns.     EXAM:  There were no vitals taken for this visit.  GEN: Alert, no distress  HEENT: Conjunctiva clear.   PULM: Breathing comfortably on RA  CV: Extrem warm and well perfused  ABD: No distension  SKIN: Exam of the face, neck, chest, abdomen, back, arms, legs, hands, feet, buttocks. Normal except as follows:  --3 mm brown macule on the L parietal scalp  --Light brown macules across the nose, cheeks, upper back, upper chest, shoulders  --Follicularly based hyperkeratotic papules on the lateral arms and thighs  --Scattered light brown slightly raised papules on the  back with indistinct pigment at the periphery.   --R upper arm with approx 8 mm med brown macule with globular network  --1-2 mm medium brown macules on the legs  --6 mm brown macule on the L buttock  --L lateral canthus with approx 6 mm subcutaneous mobile papule

## 2020-03-02 ENCOUNTER — HEALTH MAINTENANCE LETTER (OUTPATIENT)
Age: 30
End: 2020-03-02

## 2020-04-14 DIAGNOSIS — J10.1 INFLUENZA A: ICD-10-CM

## 2020-04-15 RX ORDER — DEXAMETHASONE 4 MG/1
TABLET ORAL
Qty: 12 G | OUTPATIENT
Start: 2020-04-15

## 2020-04-15 NOTE — TELEPHONE ENCOUNTER
Flovent inhaler was prescribed 3/24/19 at  visit for acute symptoms.   Patient needs appointment if having symptoms.   Updated pharmacy to have patient call us.

## 2020-09-28 ENCOUNTER — OFFICE VISIT (OUTPATIENT)
Dept: URGENT CARE | Facility: URGENT CARE | Age: 30
End: 2020-09-28
Payer: COMMERCIAL

## 2020-09-28 VITALS
DIASTOLIC BLOOD PRESSURE: 51 MMHG | BODY MASS INDEX: 43.58 KG/M2 | OXYGEN SATURATION: 97 % | SYSTOLIC BLOOD PRESSURE: 109 MMHG | HEART RATE: 85 BPM | TEMPERATURE: 97.2 F | WEIGHT: 253.9 LBS

## 2020-09-28 DIAGNOSIS — N30.01 ACUTE CYSTITIS WITH HEMATURIA: Primary | ICD-10-CM

## 2020-09-28 DIAGNOSIS — R30.0 DYSURIA: ICD-10-CM

## 2020-09-28 DIAGNOSIS — R82.90 ABNORMAL URINE FINDINGS: ICD-10-CM

## 2020-09-28 LAB
ALBUMIN UR-MCNC: 30 MG/DL
APPEARANCE UR: ABNORMAL
BACTERIA #/AREA URNS HPF: ABNORMAL /HPF
BILIRUB UR QL STRIP: NEGATIVE
COLOR UR AUTO: YELLOW
GLUCOSE UR STRIP-MCNC: NEGATIVE MG/DL
HGB UR QL STRIP: ABNORMAL
KETONES UR STRIP-MCNC: NEGATIVE MG/DL
LEUKOCYTE ESTERASE UR QL STRIP: ABNORMAL
NITRATE UR QL: NEGATIVE
PH UR STRIP: 5.5 PH (ref 5–7)
RBC #/AREA URNS AUTO: ABNORMAL /HPF
SOURCE: ABNORMAL
SP GR UR STRIP: 1.02 (ref 1–1.03)
UROBILINOGEN UR STRIP-ACNC: 0.2 EU/DL (ref 0.2–1)
WBC #/AREA URNS AUTO: >100 /HPF

## 2020-09-28 PROCEDURE — 81001 URINALYSIS AUTO W/SCOPE: CPT | Performed by: PHYSICIAN ASSISTANT

## 2020-09-28 PROCEDURE — 87086 URINE CULTURE/COLONY COUNT: CPT | Performed by: PHYSICIAN ASSISTANT

## 2020-09-28 PROCEDURE — 99213 OFFICE O/P EST LOW 20 MIN: CPT | Performed by: PHYSICIAN ASSISTANT

## 2020-09-28 RX ORDER — NITROFURANTOIN 25; 75 MG/1; MG/1
100 CAPSULE ORAL 2 TIMES DAILY
Qty: 10 CAPSULE | Refills: 0 | Status: SHIPPED | OUTPATIENT
Start: 2020-09-28 | End: 2020-10-03

## 2020-09-28 ASSESSMENT — ENCOUNTER SYMPTOMS
FREQUENCY: 1
DYSURIA: 1
HEMATURIA: 1

## 2020-09-28 NOTE — PROGRESS NOTES
SUBJECTIVE:   Kate Uribe is a 30 year old female presenting with a chief complaint of   Chief Complaint   Patient presents with     Urgent Care     UTI     start 3 days sx increased frequency, and dysuria tx cranberry, increased fluids        She is an established patient of [facility].      UTI    Onset of symptoms was 3day(s).  Course of illness is same  Severity moderate  Current and associated symptoms dysuria, frequency and blood in urine  Treatment and measures tried Cranberry juice  Predisposing factors include none  Patient denies rigors, flank pain, temperature > 101 degrees F. and vomiting        Review of Systems   Genitourinary: Positive for dysuria, frequency and hematuria.   All other systems reviewed and are negative.      Past Medical History:   Diagnosis Date     Anemia      Asthma      Crohn disease (H)      Heart murmur      Mental disorder     anxiety     Noninfectious ileitis     Crohn's disease     Preeclampsia      Family History   Problem Relation Age of Onset     Diabetes Mother      Hypertension Mother      Hyperlipidemia Mother      Obesity Mother      Gallbladder Disease Mother      Mental Illness Mother      Thyroid Disease Mother      Colon Cancer Maternal Grandfather 70     Hypertension Maternal Grandmother      Hyperlipidemia Maternal Grandmother      Chronic Obstructive Pulmonary Disease Maternal Grandmother      Pulmonary Embolism Maternal Grandmother      Hypertension Sister      Hyperlipidemia Sister      Obesity Sister      Mental Illness Sister      Current Outpatient Medications   Medication Sig Dispense Refill     albuterol (PROAIR HFA/PROVENTIL HFA/VENTOLIN HFA) 108 (90 Base) MCG/ACT inhaler Inhale 2 puffs into the lungs every 6 hours 1 Inhaler 11     Cholecalciferol (VITAMIN D) 2000 units CAPS        FLUoxetine (PROZAC) 40 MG capsule TAKE TWO CAPSULES BY MOUTH ONCE DAILY 180 capsule 0     InFLIXimab (REMICADE IV) Inject 500 mg into the vein Every 8 weeks        nitroFURantoin macrocrystal-monohydrate (MACROBID) 100 MG capsule Take 1 capsule (100 mg) by mouth 2 times daily for 5 days 10 capsule 0     Social History     Tobacco Use     Smoking status: Never Smoker     Smokeless tobacco: Never Used   Substance Use Topics     Alcohol use: Yes     Alcohol/week: 0.0 - 2.0 standard drinks     Comment: Social       OBJECTIVE  /51   Pulse 85   Temp 97.2  F (36.2  C)   Wt 115.2 kg (253 lb 14.4 oz)   SpO2 97%   BMI 43.58 kg/m      Physical Exam  Vitals signs and nursing note reviewed.   Constitutional:       Appearance: Normal appearance. She is obese.   Eyes:      Extraocular Movements: Extraocular movements intact.      Conjunctiva/sclera: Conjunctivae normal.   Neck:      Musculoskeletal: Normal range of motion and neck supple.   Cardiovascular:      Rate and Rhythm: Normal rate and regular rhythm.      Pulses: Normal pulses.      Heart sounds: Normal heart sounds.   Pulmonary:      Effort: Pulmonary effort is normal.      Breath sounds: Normal breath sounds.   Abdominal:      Tenderness: There is no right CVA tenderness or left CVA tenderness.   Skin:     General: Skin is warm and dry.      Capillary Refill: Capillary refill takes less than 2 seconds.   Neurological:      General: No focal deficit present.      Mental Status: She is alert.   Psychiatric:         Mood and Affect: Mood normal.         Behavior: Behavior normal.         Labs:  Results for orders placed or performed in visit on 09/28/20 (from the past 24 hour(s))   *UA reflex to Microscopic and Culture ([place] and [facility] (except [place] and [place])    Specimen: Midstream Urine   Result Value Ref Range    Color Urine Yellow     Appearance Urine Cloudy     Glucose Urine Negative NEG^Negative mg/dL    Bilirubin Urine Negative NEG^Negative    Ketones Urine Negative NEG^Negative mg/dL    Specific Gravity Urine 1.020 1.003 - 1.035    Blood Urine Large (A) NEG^Negative    pH Urine 5.5 5.0 - 7.0 pH     Protein Albumin Urine 30 (A) NEG^Negative mg/dL    Urobilinogen Urine 0.2 0.2 - 1.0 EU/dL    Nitrite Urine Negative NEG^Negative    Leukocyte Esterase Urine Large (A) NEG^Negative    Source Midstream Urine    Urine Microscopic   Result Value Ref Range    WBC Urine >100 (A) OTO5^0 - 5 /HPF    RBC Urine  (A) OTO2^O - 2 /HPF    Bacteria Urine Moderate (A) NEG^Negative /HPF       X-Ray was not done.    ASSESSMENT:      ICD-10-CM    1. Acute cystitis with hematuria  N30.01 nitroFURantoin macrocrystal-monohydrate (MACROBID) 100 MG capsule   2. Dysuria  R30.0 *UA reflex to Microscopic and Culture ([facility] (except [place] and [place])     Urine Microscopic   3. Abnormal urine findings  R82.90 Urine Culture Aerobic Bacterial        Medical Decision Making:    Differential Diagnosis:  UTI: UTI    Serious Comorbid Conditions:  Adult:  None    PLAN:    Macrobid.  Discussed reasons to seek immediate medical attention.,  [place] at home.,    Followup:    If not improving or if condition worsens, follow up with your Primary Care Provider, If not improving or if conditions worsens over the next 12-24 hours, go to the Emergency Department    Patient Instructions       Patient Education     Understanding Urinary Tract Infections (UTIs)  Most UTIs are caused by bacteria, although they may also be caused by viruses or fungi. Bacteria from the bowel are the most common source of infection. The infection may start because of any of the following:    Sexual activity. During sex, bacteria can travel from the penis, vagina, or rectum into the urethra.     Bacteria on the skin outside the rectum may travel into the urethra. This is more common in women since the rectum and urethra are closer to each other than in men. Wiping from front to back after using the toilet and keeping the area clean can help prevent germs from getting to the urethra.    Blockage of urine flow through the urinary tract. If urine sits too  long, germs may start to grow out of control.      Parts of the urinary tract  The infection can occur in any part of the urinary tract.    The kidneys collect and store urine.    The ureters carry urine from the kidneys to the bladder.    The bladder holds urine until you are ready to let it out.    The urethra carries urine from the bladder out of the body. It is shorter in women, so bacteria can move through it more easily. The urethra is longer in men, so a UTI is less likely to reach the bladder or kidneys in men.  Date Last Reviewed: 1/1/2017 2000-2019 [employer]. [address] [place] [address]. All rights reserved. This information is not intended as a substitute for professional medical care. Always follow your healthcare professional's instructions.

## 2020-09-28 NOTE — PATIENT INSTRUCTIONS
Patient Education     Understanding Urinary Tract Infections (UTIs)  Most UTIs are caused by bacteria, although they may also be caused by viruses or fungi. Bacteria from the bowel are the most common source of infection. The infection may start because of any of the following:    Sexual activity. During sex, bacteria can travel from the penis, vagina, or rectum into the urethra.     Bacteria on the skin outside the rectum may travel into the urethra. This is more common in women since the rectum and urethra are closer to each other than in men. Wiping from front to back after using the toilet and keeping the area clean can help prevent germs from getting to the urethra.    Blockage of urine flow through the urinary tract. If urine sits too long, germs may start to grow out of control.      Parts of the urinary tract  The infection can occur in any part of the urinary tract.    The kidneys collect and store urine.    The ureters carry urine from the kidneys to the bladder.    The bladder holds urine until you are ready to let it out.    The urethra carries urine from the bladder out of the body. It is shorter in women, so bacteria can move through it more easily. The urethra is longer in men, so a UTI is less likely to reach the bladder or kidneys in men.  Date Last Reviewed: 1/1/2017 2000-2019 [employer]. [address] [place] [address], [place] [address]. All rights reserved. This information is not intended as a substitute for professional medical care. Always follow your healthcare professional's instructions.

## 2020-09-29 LAB
BACTERIA SPEC CULT: NORMAL
SPECIMEN SOURCE: NORMAL

## 2020-11-09 ENCOUNTER — E-VISIT (OUTPATIENT)
Dept: URGENT CARE | Facility: URGENT CARE | Age: 30
End: 2020-11-09
Payer: COMMERCIAL

## 2020-11-09 DIAGNOSIS — Z20.822 SUSPECTED COVID-19 VIRUS INFECTION: Primary | ICD-10-CM

## 2020-11-09 PROCEDURE — 99421 OL DIG E/M SVC 5-10 MIN: CPT | Performed by: FAMILY MEDICINE

## 2020-11-09 NOTE — PATIENT INSTRUCTIONS
"  Dear Kate Uribe,    Your symptoms show that you may have coronavirus (COVID-19). This illness can cause fever, cough and trouble breathing. Many people get a mild case and get better on their own. Some people can get very sick.    Will I be tested for COVID-19?  We would like to test you for this virus. I have placed an order for this test and please call [phone] to schedule testing. [place] employees please call [phone].  [employer] employees call [phone].     When it's time for your COVID test:  Stay at least 6 feet away from others. (If someone will drive you to your test, stay in the backseat, as far away from the  as you can.)  Cover your mouth and nose with a mask, tissue or washcloth.  Go straight to the testing site. Don't make any stops on the way there or back.    Starting now:     Do not go to work. Follow your usual processes for taking time away from work.  o If you receive a negative COVID-19 test result and were NOT exposed to someone with a known positive COVID-19 test, you can return to work once you're free of fever for 24 hours without fever-reducing medication and your symptoms are improving or resolved.  o If you receive a positive COVID-19 test result, you must be cleared by Employee Occupational Health and Safety to return to work.   o If you were exposed to someone who has tested positive for COVID-19, you can return to work 14 days after your last contact with the positive individual, provided you do not have symptoms at all during that time. In some cases, your manager may ask you to come back sooner than 14 days.     During this time, don't leave the house except for testing or medical care.  o Stay in your own room, even for meals. Use your own bathroom if you can.  o Stay away from others in your home. No hugging, kissing or shaking hands. No visitors.  o Don't go to work, school or anywhere else.    Clean \"high touch\" surfaces often (doorknobs, " counters, handles, etc.). Use a household cleaning spray or wipes. You'll find a full list of  on the EPA website: www.epa.gov/pesticide-registration/list-n-disinfectants-use-against-sars-cov-2.    Cover your mouth and nose with a mask, tissue or washcloth to avoid spreading germs.    Wash your hands and face often. Use soap and water.    People in these groups are at risk for severe illness due to COVID-19:  o People 65 years and older  o People who live in a nursing home or long-term care facility  o People with chronic disease (lung, heart, cancer, diabetes, kidney, liver, immunologic)  o People who have a weakened immune system, including those who:  - Are in cancer treatment  - Take medicine that weakens the immune system, such as corticosteroids  - Had a bone marrow or organ transplant  - Have an immune deficiency  - Have poorly controlled HIV or AIDS  - Are obese (body mass index of 40 or higher)  - Smoke regularly      Caregivers should wear gloves while washing dishes, handling laundry and cleaning bedrooms and bathrooms.    Use caution when washing and drying laundry: Don't shake dirty laundry, and use the warmest water setting that you can.    For more tips, go to www.cdc.gov/coronavirus/2019-ncov/downloads/10Things.pdf.    Sign up for [employer]. We know it's scary to hear that you might have COVID-19. We want to track your symptoms to make sure you're okay over the next 2 weeks. Please look for an email from [employer]--this is a free, online program that we'll use to keep in touch. To sign up, follow the link in the email you will receive. Learn more at [email or web address]    How can I take care of myself?    Get lots of rest. Drink extra fluids (unless a doctor has told you not to)    Take Tylenol (acetaminophen) for fever or pain. If you have liver or kidney problems, ask your family doctor if it's okay to take Tylenol.  Adults can take either:    650 mg (two 325 mg pills)  every 4 to 6 hours, or     1,000 mg (two 500 mg pills) every 8 hours as needed.    Note: Don't take more than 3,000 mg in one day. Acetaminophen is found in many medicines (both prescribed and over-the-counter medicines). Read all labels to be sure you don't take too much.  For children, check the Tylenol bottle for the right dose. The dose is based on the child's age or weight.    If you have other health problems (like cancer, heart failure, an organ transplant or severe kidney disease): Call your specialty clinic if you don't feel better in the next 2 days.    Know when to call 911. Emergency warning signs include:  Trouble breathing or shortness of breath  Pain or pressure in the chest that doesn't go away  Feeling confused like you haven't felt before, or not being able to wake up  Bluish-colored lips or face    Where can I get more information?    [facility] - About COVID-19: [email or web address]  CDC - What to Do If You're Sick: www.cdc.gov/coronavirus/2019-ncov/about/steps-when-sick.html  November 9, 2020    RE:  Kate Uribe                                                                                                                                                       [address] [place] [place] [address]        To whom it may concern:    I evaluated Kate Uribe on November 9, 2020. Kate Uribe should be excused from work/school.        Do not go to work. Follow your usual processes for taking time away from work.  o      If you receive a negative COVID-19 test result and were NOT exposed to someone with a known positive COVID-19 test, you can return to work once you're free of fever for 24 hours without fever-reducing medication and your symptoms are improving or resolved.  o      If you receive a positive COVID-19 test result, you must be cleared by Employee Occupational Health and Safety to return to work.  o      If you were exposed to someone who has tested  positive for COVID-19, you can return to work 14 days after your last contact with the positive individual, provided you do not have symptoms at all during that time. In some cases, your manager may ask you to come back sooner than 14 days.    Sincerely,  Leo Hubbard MD

## 2021-04-18 ENCOUNTER — HEALTH MAINTENANCE LETTER (OUTPATIENT)
Age: 31
End: 2021-04-18

## 2021-10-03 ENCOUNTER — HEALTH MAINTENANCE LETTER (OUTPATIENT)
Age: 31
End: 2021-10-03

## 2022-05-15 ENCOUNTER — HEALTH MAINTENANCE LETTER (OUTPATIENT)
Age: 32
End: 2022-05-15

## 2022-09-10 ENCOUNTER — HEALTH MAINTENANCE LETTER (OUTPATIENT)
Age: 32
End: 2022-09-10

## 2022-10-14 ENCOUNTER — TRANSFERRED RECORDS (OUTPATIENT)
Dept: HEALTH INFORMATION MANAGEMENT | Facility: CLINIC | Age: 32
End: 2022-10-14

## 2022-10-19 ENCOUNTER — TELEPHONE (OUTPATIENT)
Dept: SURGERY | Facility: CLINIC | Age: 32
End: 2022-10-19

## 2022-10-19 NOTE — TELEPHONE ENCOUNTER
[facility] Call Center    Phone Message    May a detailed message be left on voicemail: yes     Reason for Call: Other: pt needs to talk to colon and rectal nurse      Action Taken: Message routed to:  [facility]): colon and rectal     Travel Screening: Not Applicable

## 2022-10-19 NOTE — TELEPHONE ENCOUNTER
Patient has Crohn's and her gastroenterologist found a small bowel nodule on her last MRI.  She was calling to schedule a consultation for this.  I offered her an appointment on November 7th with  however she states she is able to get in with a different organization a couple days before that.  She was just calling around to see if she could get in sooner.  She asked to be put on the wait list in case of cancellations which I did for her.

## 2022-10-24 ENCOUNTER — MEDICAL CORRESPONDENCE (OUTPATIENT)
Dept: HEALTH INFORMATION MANAGEMENT | Facility: CLINIC | Age: 32
End: 2022-10-24

## 2022-10-25 ENCOUNTER — MEDICAL CORRESPONDENCE (OUTPATIENT)
Dept: HEALTH INFORMATION MANAGEMENT | Facility: CLINIC | Age: 32
End: 2022-10-25

## 2022-10-26 ENCOUNTER — TRANSCRIBE ORDERS (OUTPATIENT)
Dept: OTHER | Age: 32
End: 2022-10-26

## 2022-10-26 DIAGNOSIS — R93.5 ABNORMAL CT OF THE ABDOMEN: Primary | ICD-10-CM

## 2022-11-01 ENCOUNTER — TELEPHONE (OUTPATIENT)
Dept: GASTROENTEROLOGY | Facility: CLINIC | Age: 32
End: 2022-11-01

## 2022-11-01 NOTE — LETTER
"November 2, 2022      Referred by: Dariana Maldonado MD - [facility]  [address]  [place] [address]  Ph: [phone] Fx: [phone]        October 26, 2021    Dear Dr Maldonado,    Thank you for your referral of Kate Uribe, 1990, to the [facility] related to abnormal finding on GI imaging.    Our physicians have reviewed the patient's records and are recommending the following at this time:    \" I suggest they repeat MR or CT enterography (unfortunately for the patient); a missed lesion on endoscopy does not mean its not there; no lesion on a solid enterography supports its not being there\"    Please reach out to our office with any questions or concerns, and feel free to refer as needed per the recommendations above.    Sincerely,    Maine Valle, RN Care Coordinator  Dr. Cochran, Dr. Bird & Dr. Gutierrez  [facility]  [phone]    "

## 2022-11-01 NOTE — TELEPHONE ENCOUNTER
[facility]     Referring provider:   Dariana Maldonado MD      Referred to: [facility]     Provider Requested:  NA     Referral Received: 11/01/22     Records received: CareEverywhere     Images received: MR Enterography in PACS    Evaluation for:  Single balloon enteroscopy to evaluate for small bowel nodule     Clinical History (per RN review):     GI Note 10-14-22  IMPRESSION, REPORT, AND PLAN:     1. Crohn ileitis.  2. Incidental equivocal intraluminal small bowel nodule.  3. Altered bowel habits.   Reviewed Ms. Uribe that colonoscopy from July 2022, showed just a single TI erosion. This was in the setting of taking Stelara, will be repeating enterography study from a standpoint of the equivocal incidental small bowel. We will assess if there is any concern for active inflammatory bowel disease. We discussed the following plan:  a. Continue Stelara.  b. Fecal calprotectin is pending.  c. MR enterography as scheduled. We discussed if the nodule is gone, then all good. Nothing further to do. If the nodule is persistent or bigger, then would recommend Colorectal Surgery consult for discussion as to whether or not exploratory laparoscopy is appropriate as well as referral for balloon endoscopy. This would be performed at either [facility] or [facility].  d. Given her altered bowel habits, we will have her try a low dose of fiber. Unclear to what extent she will tolerate this given her sleeve gastrectomy. She will aim for 3 to 5 g per day and increase by 3 to 5 g each week until either she feels well or reaches 10 g per day.  e. We will have her stop colestipol for now to see if that may be contributing to the episodes of constipation.  f. We will have her do a breath test for bacterial overgrowth.  g. She will continue MiraLAX 2 to 2-1/2 caps daily.  h. Continue Levsin as needed.  i. From a constipation standpoint, we could consider medications such as Linzess, Amitiza, and  Motegrity if the above interventions do not work.  j. Recommend Pelvic Floor Center evaluation.     MR enterography 10-14-22    IMPRESSION:   1. Previously visualized enhancing nodule in the small bowel of the right abdomen is not definitively visualized on today's exam, though mild respiratory motion and small bowel gas in the region could potentially obscure a nodule this small. Continued follow-up/management is at the discretion of the GI service.   2. Slightly irregular, long segment, mild active on chronic inflammation involving the distal 15 cm of the distal/terminal ileum. This appears slightly the more pronounced than on 6/16/2022. 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   3. A 15 cm segment of the proximal sigmoid colon shows findings concerning for mild active inflammation. This area appeared normal on the prior exam.     6/16/22 MR enterography    IMPRESSION:   1. Mild chronic short segmental inflammatory change involving the distal ileum. No convincing acute inflammatory change. No luminal narrowing or significant stricture formation.   2. Equivocal enhancing intraluminal nodule involving the small bowel in the right lower quadrant. Capsule endoscopy could be considered.  At a minimum, attention of follow-up would be beneficial.       CT A/P 10-14-22    BOWEL: No obstruction. In fact, the colon has a moderate amount of diffuse air and modest throughout. Mild amount amount stool.. Terminal ileum appears within normal limits, no inflammatory focus involving bowel. Small bowel mesentery unremarkable.   Previous gastric surgery likely sleeve gastrectomy.     LYMPH NODES: Normal.     VASCULATURE: Unremarkable.     PELVIC ORGANS: Normal.     MUSCULOSKELETAL: Normal.     IMPRESSION:   1.  No etiology for symptoms evident.  Nothing obstructive or inflammatory.        MD review date: 11/01/22    MD Decision for clinic consultation/Orders:            Referral updates/Patient contacted:

## 2022-11-01 NOTE — TELEPHONE ENCOUNTER
Per Dr. Bird  The recent enterography suggested the nodule was not there. Are they hesitant to do a capsule due to the stenosis?    Called patient to discuss

## 2022-11-02 NOTE — TELEPHONE ENCOUNTER
Per Dr. Bird I suggest they repeat MR or CT enterography (unfortunately for the patient); a missed lesion on endoscopy does not mean its not there; no lesion on a solid enterography supports its not being there        Called patient, reviewed recommendation above for repeat imaging.     Pt will follow up referring GI doctor to follow up on requesting repeat imaging.    Letter routed to referral office    Referred by: Dariana Maldonado MD - [facility]  [address]  [place] [address]  Ph: [phone] Fx: [phone]    ML

## 2023-06-03 ENCOUNTER — HEALTH MAINTENANCE LETTER (OUTPATIENT)
Age: 33
End: 2023-06-03

## 2024-02-29 NOTE — NURSING NOTE
"Chief Complaint   Patient presents with     Weight Problem     weight management       Initial BP (P) 101/58 (BP Location: Left arm, Patient Position: Chair, Cuff Size: Adult Large)  Pulse (P) 78  Temp (P) 98.4  F (36.9  C) (Oral)  Resp (P) 16  Wt (P) 115.3 kg (254 lb 3.2 oz)  LMP 06/09/2017 (Exact Date)  SpO2 (P) 95%  Breastfeeding? No  BMI (P) 45.03 kg/m2 Estimated body mass index is 45.03 kg/(m^2) (pended) as calculated from the following:    Height as of 3/13/17: 1.6 m (5' 3\").    Weight as of this encounter: (P) 115.3 kg (254 lb 3.2 oz).  Medication Reconciliation: complete  Breana Crouch M.A.  " Detail Level: Detailed

## 2025-03-02 NOTE — TELEPHONE ENCOUNTER
Form signed by Dr. Gamble and placed at  for .    Emilie Walsh MA     Louis RENTERIA Gerald Matthews PA-C [place] NP [facility] [place] nP [place] np Olayinka RENTERIA

## (undated) DEVICE — GLOVE PROTEXIS POWDER FREE SMT 7.5  2D72PT75X

## (undated) DEVICE — TRANSFER DEVICE BLOOD NDL HOLDER 364880

## (undated) DEVICE — PREP POVIDONE IODINE SOLUTION 10% 120ML

## (undated) DEVICE — SUCTION CANISTER MEDIVAC LINER 3000ML W/LID 65651-530

## (undated) DEVICE — STPL SKIN 35W APPOSE 8886803712

## (undated) DEVICE — SU VICRYL 0 CT-1 36" J346H

## (undated) DEVICE — DRSG TEGADERM 4X10" 1627

## (undated) DEVICE — PAD CHUX UNDERPAD 30X36" P3036C

## (undated) DEVICE — BLADE CLIPPER SGL USE 9680

## (undated) DEVICE — GLOVE PROTEXIS BLUE W/NEU-THERA 8.0  2D73EB80

## (undated) DEVICE — LINEN TOWEL PACK X10 5473

## (undated) DEVICE — ESU GROUND PAD ADULT W/CORD E7507

## (undated) DEVICE — BAG CLEAR TRASH 1.3M 39X33" P4040C

## (undated) DEVICE — CATH TRAY FOLEY SURESTEP 16FR DRAIN BAG STATOCK A899916

## (undated) DEVICE — STPL SKIN SUBCUTICULAR INSORB  2030

## (undated) DEVICE — LINEN BABY BLANKET 5434

## (undated) DEVICE — SU VICRYL 3-0 CT-1 36" J344H

## (undated) DEVICE — PREP CHLORAPREP 26ML TINTED ORANGE  260815

## (undated) DEVICE — CAP BABY PINK/BLUE IC-2

## (undated) DEVICE — LINEN FULL SHEET 5511

## (undated) DEVICE — PREP SKIN SCRUB TRAY 4461A

## (undated) DEVICE — LINEN HALF SHEET 5512

## (undated) DEVICE — PACK C-SECTION LF PL15OTA83B

## (undated) DEVICE — GLOVE PROTEXIS POWDER FREE 6.5 ORTHOPEDIC 2D73ET65

## (undated) DEVICE — STOCKING SLEEVE VASOPRESS COMPRESSION CALF MED 18" VP501M

## (undated) DEVICE — LINEN DRAPE 54X72" 5467

## (undated) DEVICE — HEMOSTAT ABSORBABLE ARISTA 5GM SM0007-USA

## (undated) DEVICE — SOL WATER IRRIG 1000ML BOTTLE 2F7114

## (undated) DEVICE — SOL NACL 0.9% IRRIG 1000ML BOTTLE 2F7124

## (undated) RX ORDER — FENTANYL CITRATE 50 UG/ML
INJECTION, SOLUTION INTRAMUSCULAR; INTRAVENOUS
Status: DISPENSED
Start: 2018-02-23

## (undated) RX ORDER — MORPHINE SULFATE 1 MG/ML
INJECTION, SOLUTION EPIDURAL; INTRATHECAL; INTRAVENOUS
Status: DISPENSED
Start: 2018-02-23